# Patient Record
Sex: FEMALE | Race: WHITE | NOT HISPANIC OR LATINO | Employment: OTHER | URBAN - METROPOLITAN AREA
[De-identification: names, ages, dates, MRNs, and addresses within clinical notes are randomized per-mention and may not be internally consistent; named-entity substitution may affect disease eponyms.]

---

## 2017-01-01 ENCOUNTER — GENERIC CONVERSION - ENCOUNTER (OUTPATIENT)
Dept: OTHER | Facility: OTHER | Age: 82
End: 2017-01-01

## 2017-01-01 ENCOUNTER — HOSPITAL ENCOUNTER (INPATIENT)
Facility: HOSPITAL | Age: 82
LOS: 2 days | DRG: 083 | End: 2017-11-14
Attending: SURGERY | Admitting: SURGERY
Payer: MEDICARE

## 2017-01-01 ENCOUNTER — APPOINTMENT (EMERGENCY)
Dept: CT IMAGING | Facility: HOSPITAL | Age: 82
End: 2017-01-01
Payer: MEDICARE

## 2017-01-01 ENCOUNTER — APPOINTMENT (INPATIENT)
Dept: RADIOLOGY | Facility: HOSPITAL | Age: 82
DRG: 083 | End: 2017-01-01
Payer: MEDICARE

## 2017-01-01 ENCOUNTER — APPOINTMENT (EMERGENCY)
Dept: RADIOLOGY | Facility: HOSPITAL | Age: 82
DRG: 564 | End: 2017-01-01
Payer: MEDICARE

## 2017-01-01 ENCOUNTER — APPOINTMENT (INPATIENT)
Dept: NON INVASIVE DIAGNOSTICS | Facility: HOSPITAL | Age: 82
DRG: 564 | End: 2017-01-01
Payer: MEDICARE

## 2017-01-01 ENCOUNTER — APPOINTMENT (INPATIENT)
Dept: NEUROLOGY | Facility: AMBULATORY SURGERY CENTER | Age: 82
DRG: 100 | End: 2017-01-01
Payer: MEDICARE

## 2017-01-01 ENCOUNTER — HOSPITAL ENCOUNTER (INPATIENT)
Facility: HOSPITAL | Age: 82
LOS: 6 days | DRG: 100 | End: 2017-11-20
Attending: EMERGENCY MEDICINE | Admitting: SURGERY
Payer: MEDICARE

## 2017-01-01 ENCOUNTER — APPOINTMENT (INPATIENT)
Dept: RADIOLOGY | Facility: HOSPITAL | Age: 82
DRG: 564 | End: 2017-01-01
Payer: MEDICARE

## 2017-01-01 ENCOUNTER — APPOINTMENT (EMERGENCY)
Dept: RADIOLOGY | Facility: HOSPITAL | Age: 82
DRG: 291 | End: 2017-01-01
Payer: MEDICARE

## 2017-01-01 ENCOUNTER — HOSPITAL ENCOUNTER (EMERGENCY)
Facility: HOSPITAL | Age: 82
End: 2017-11-12
Attending: EMERGENCY MEDICINE | Admitting: EMERGENCY MEDICINE
Payer: MEDICARE

## 2017-01-01 ENCOUNTER — APPOINTMENT (EMERGENCY)
Dept: CT IMAGING | Facility: HOSPITAL | Age: 82
DRG: 564 | End: 2017-01-01
Payer: MEDICARE

## 2017-01-01 ENCOUNTER — HOSPITAL ENCOUNTER (INPATIENT)
Facility: HOSPITAL | Age: 82
LOS: 5 days | Discharge: NURSING, REHAB OR LONG-TERM CARE | DRG: 291 | End: 2017-05-12
Attending: EMERGENCY MEDICINE | Admitting: HOSPITALIST
Payer: MEDICARE

## 2017-01-01 ENCOUNTER — HOSPITAL ENCOUNTER (INPATIENT)
Facility: HOSPITAL | Age: 82
LOS: 4 days | Discharge: NURSING, REHAB OR LONG-TERM CARE | DRG: 564 | End: 2017-05-06
Attending: EMERGENCY MEDICINE | Admitting: INTERNAL MEDICINE
Payer: MEDICARE

## 2017-01-01 ENCOUNTER — APPOINTMENT (EMERGENCY)
Dept: RADIOLOGY | Facility: HOSPITAL | Age: 82
DRG: 083 | End: 2017-01-01
Payer: MEDICARE

## 2017-01-01 ENCOUNTER — APPOINTMENT (INPATIENT)
Dept: RADIOLOGY | Facility: HOSPITAL | Age: 82
DRG: 100 | End: 2017-01-01
Payer: MEDICARE

## 2017-01-01 ENCOUNTER — APPOINTMENT (EMERGENCY)
Dept: RADIOLOGY | Facility: HOSPITAL | Age: 82
DRG: 100 | End: 2017-01-01
Payer: MEDICARE

## 2017-01-01 VITALS
SYSTOLIC BLOOD PRESSURE: 152 MMHG | OXYGEN SATURATION: 100 % | TEMPERATURE: 97.9 F | HEIGHT: 62 IN | RESPIRATION RATE: 16 BRPM | DIASTOLIC BLOOD PRESSURE: 68 MMHG | BODY MASS INDEX: 28.64 KG/M2 | WEIGHT: 155.65 LBS | HEART RATE: 79 BPM

## 2017-01-01 VITALS
BODY MASS INDEX: 30.07 KG/M2 | SYSTOLIC BLOOD PRESSURE: 140 MMHG | HEART RATE: 58 BPM | DIASTOLIC BLOOD PRESSURE: 84 MMHG | WEIGHT: 176.15 LBS | TEMPERATURE: 98.5 F | HEIGHT: 64 IN | OXYGEN SATURATION: 99 % | RESPIRATION RATE: 20 BRPM

## 2017-01-01 VITALS
SYSTOLIC BLOOD PRESSURE: 116 MMHG | DIASTOLIC BLOOD PRESSURE: 81 MMHG | WEIGHT: 149.25 LBS | OXYGEN SATURATION: 89 % | HEIGHT: 62 IN | BODY MASS INDEX: 27.47 KG/M2 | HEART RATE: 90 BPM | RESPIRATION RATE: 16 BRPM | TEMPERATURE: 99.4 F

## 2017-01-01 VITALS
WEIGHT: 152.56 LBS | SYSTOLIC BLOOD PRESSURE: 142 MMHG | RESPIRATION RATE: 16 BRPM | DIASTOLIC BLOOD PRESSURE: 86 MMHG | OXYGEN SATURATION: 100 % | HEART RATE: 80 BPM | TEMPERATURE: 99.9 F | BODY MASS INDEX: 27.02 KG/M2

## 2017-01-01 VITALS
BODY MASS INDEX: 29.26 KG/M2 | HEIGHT: 63 IN | RESPIRATION RATE: 18 BRPM | WEIGHT: 165.12 LBS | HEART RATE: 78 BPM | OXYGEN SATURATION: 97 % | DIASTOLIC BLOOD PRESSURE: 65 MMHG | TEMPERATURE: 98 F | SYSTOLIC BLOOD PRESSURE: 146 MMHG

## 2017-01-01 DIAGNOSIS — J81.1 PULMONARY EDEMA: Primary | ICD-10-CM

## 2017-01-01 DIAGNOSIS — S00.83XA FACIAL CONTUSION: ICD-10-CM

## 2017-01-01 DIAGNOSIS — I83.019 VENOUS ULCERS OF BOTH LOWER EXTREMITIES (HCC): ICD-10-CM

## 2017-01-01 DIAGNOSIS — M62.82 RHABDOMYOLYSIS: ICD-10-CM

## 2017-01-01 DIAGNOSIS — D72.829 LEUKOCYTOSIS: ICD-10-CM

## 2017-01-01 DIAGNOSIS — S06.5X0A TRAUMATIC SUBDURAL HEMORRHAGE WITHOUT LOSS OF CONSCIOUSNESS, INITIAL ENCOUNTER (HCC): Primary | ICD-10-CM

## 2017-01-01 DIAGNOSIS — T14.8XXA MULTIPLE WOUNDS OF SKIN: ICD-10-CM

## 2017-01-01 DIAGNOSIS — L97.919 VENOUS ULCERS OF BOTH LOWER EXTREMITIES (HCC): ICD-10-CM

## 2017-01-01 DIAGNOSIS — L89.90 PRESSURE INJURY OF SKIN: ICD-10-CM

## 2017-01-01 DIAGNOSIS — S06.5X9A SUBDURAL HEMATOMA (HCC): Primary | ICD-10-CM

## 2017-01-01 DIAGNOSIS — R77.8 ELEVATED TROPONIN: ICD-10-CM

## 2017-01-01 DIAGNOSIS — R56.9 SEIZURE (HCC): Primary | ICD-10-CM

## 2017-01-01 DIAGNOSIS — I50.9 ACUTE CONGESTIVE HEART FAILURE (HCC): ICD-10-CM

## 2017-01-01 DIAGNOSIS — R41.82 ALTERED MENTAL STATUS: ICD-10-CM

## 2017-01-01 DIAGNOSIS — T79.6XXA TRAUMATIC RHABDOMYOLYSIS, INITIAL ENCOUNTER (HCC): ICD-10-CM

## 2017-01-01 DIAGNOSIS — I83.029 VENOUS ULCERS OF BOTH LOWER EXTREMITIES (HCC): ICD-10-CM

## 2017-01-01 DIAGNOSIS — N39.0 UTI (URINARY TRACT INFECTION): ICD-10-CM

## 2017-01-01 DIAGNOSIS — R29.6 MULTIPLE FALLS: ICD-10-CM

## 2017-01-01 DIAGNOSIS — R41.82 ALTERED MENTAL STATUS, UNSPECIFIED ALTERED MENTAL STATUS TYPE: ICD-10-CM

## 2017-01-01 DIAGNOSIS — L97.929 VENOUS ULCERS OF BOTH LOWER EXTREMITIES (HCC): ICD-10-CM

## 2017-01-01 DIAGNOSIS — N17.9 ACUTE KIDNEY INJURY (HCC): ICD-10-CM

## 2017-01-01 DIAGNOSIS — N39.0 URINARY TRACT INFECTION: Primary | ICD-10-CM

## 2017-01-01 LAB
ALBUMIN SERPL BCP-MCNC: 2.6 G/DL (ref 3.5–5)
ALBUMIN SERPL BCP-MCNC: 2.9 G/DL (ref 3.5–5)
ALBUMIN SERPL BCP-MCNC: 3.2 G/DL (ref 3.5–5)
ALBUMIN SERPL BCP-MCNC: 3.7 G/DL (ref 3.5–5)
ALP SERPL-CCNC: 50 U/L (ref 46–116)
ALP SERPL-CCNC: 69 U/L (ref 46–116)
ALP SERPL-CCNC: 72 U/L (ref 46–116)
ALP SERPL-CCNC: 95 U/L (ref 46–116)
ALT SERPL W P-5'-P-CCNC: 11 U/L (ref 12–78)
ALT SERPL W P-5'-P-CCNC: 37 U/L (ref 12–78)
ALT SERPL W P-5'-P-CCNC: 38 U/L (ref 12–78)
ALT SERPL W P-5'-P-CCNC: 49 U/L (ref 12–78)
ANION GAP BLD CALC-SCNC: 14 MMOL/L (ref 4–13)
ANION GAP SERPL CALCULATED.3IONS-SCNC: 10 MMOL/L (ref 4–13)
ANION GAP SERPL CALCULATED.3IONS-SCNC: 12 MMOL/L (ref 4–13)
ANION GAP SERPL CALCULATED.3IONS-SCNC: 13 MMOL/L (ref 4–13)
ANION GAP SERPL CALCULATED.3IONS-SCNC: 13 MMOL/L (ref 4–13)
ANION GAP SERPL CALCULATED.3IONS-SCNC: 14 MMOL/L (ref 4–13)
ANION GAP SERPL CALCULATED.3IONS-SCNC: 16 MMOL/L (ref 4–13)
ANION GAP SERPL CALCULATED.3IONS-SCNC: 5 MMOL/L (ref 4–13)
ANION GAP SERPL CALCULATED.3IONS-SCNC: 6 MMOL/L (ref 4–13)
ANION GAP SERPL CALCULATED.3IONS-SCNC: 6 MMOL/L (ref 4–13)
ANION GAP SERPL CALCULATED.3IONS-SCNC: 7 MMOL/L (ref 4–13)
ANION GAP SERPL CALCULATED.3IONS-SCNC: 7 MMOL/L (ref 4–13)
ANION GAP SERPL CALCULATED.3IONS-SCNC: 8 MMOL/L (ref 4–13)
ANION GAP SERPL CALCULATED.3IONS-SCNC: 9 MMOL/L (ref 4–13)
ANISOCYTOSIS BLD QL SMEAR: PRESENT
ANISOCYTOSIS BLD QL SMEAR: PRESENT
APTT PPP: 108 SECONDS (ref 23–35)
APTT PPP: 22 SECONDS (ref 23–35)
APTT PPP: 26 SECONDS (ref 23–35)
APTT PPP: 47 SECONDS (ref 23–35)
AST SERPL W P-5'-P-CCNC: 104 U/L (ref 5–45)
AST SERPL W P-5'-P-CCNC: 16 U/L (ref 5–45)
AST SERPL W P-5'-P-CCNC: 40 U/L (ref 5–45)
AST SERPL W P-5'-P-CCNC: 96 U/L (ref 5–45)
ATRIAL RATE: 120 BPM
ATRIAL RATE: 137 BPM
ATRIAL RATE: 158 BPM
ATRIAL RATE: 90 BPM
BACTERIA BLD CULT: NORMAL
BACTERIA UR CULT: NORMAL
BACTERIA UR QL AUTO: ABNORMAL /HPF
BACTERIA UR QL AUTO: ABNORMAL /HPF
BACTERIA UR QL AUTO: NORMAL /HPF
BASE EX.OXY STD BLDV CALC-SCNC: 66.5 % (ref 60–80)
BASE EXCESS BLDA CALC-SCNC: -8 MMOL/L (ref -2–3)
BASE EXCESS BLDA CALC-SCNC: 3 MMOL/L (ref -2–3)
BASE EXCESS BLDV CALC-SCNC: 2.6 MMOL/L
BASOPHILS # BLD AUTO: 0.02 THOUSANDS/ΜL (ref 0–0.1)
BASOPHILS # BLD AUTO: 0.03 THOUSANDS/ΜL (ref 0–0.1)
BASOPHILS # BLD AUTO: 0.04 THOUSANDS/ΜL (ref 0–0.1)
BASOPHILS # BLD AUTO: 0.05 THOUSANDS/ΜL (ref 0–0.1)
BASOPHILS # BLD AUTO: 0.06 THOUSANDS/ΜL (ref 0–0.1)
BASOPHILS # BLD AUTO: 0.06 THOUSANDS/ΜL (ref 0–0.1)
BASOPHILS # BLD MANUAL: 0 THOUSAND/UL (ref 0–0.1)
BASOPHILS # BLD MANUAL: 0 THOUSAND/UL (ref 0–0.1)
BASOPHILS # BLD MANUAL: 0.69 THOUSAND/UL (ref 0–0.1)
BASOPHILS NFR BLD AUTO: 0 % (ref 0–1)
BASOPHILS NFR MAR MANUAL: 0 % (ref 0–1)
BASOPHILS NFR MAR MANUAL: 0 % (ref 0–1)
BASOPHILS NFR MAR MANUAL: 2 % (ref 0–1)
BILIRUB SERPL-MCNC: 0.22 MG/DL (ref 0.2–1)
BILIRUB SERPL-MCNC: 0.26 MG/DL (ref 0.2–1)
BILIRUB SERPL-MCNC: 0.6 MG/DL (ref 0.2–1)
BILIRUB SERPL-MCNC: 1.1 MG/DL (ref 0.2–1)
BILIRUB UR QL STRIP: NEGATIVE
BUN BLD-MCNC: 32 MG/DL (ref 5–25)
BUN SERPL-MCNC: 24 MG/DL (ref 5–25)
BUN SERPL-MCNC: 26 MG/DL (ref 5–25)
BUN SERPL-MCNC: 28 MG/DL (ref 5–25)
BUN SERPL-MCNC: 34 MG/DL (ref 5–25)
BUN SERPL-MCNC: 34 MG/DL (ref 5–25)
BUN SERPL-MCNC: 35 MG/DL (ref 5–25)
BUN SERPL-MCNC: 36 MG/DL (ref 5–25)
BUN SERPL-MCNC: 37 MG/DL (ref 5–25)
BUN SERPL-MCNC: 38 MG/DL (ref 5–25)
BUN SERPL-MCNC: 38 MG/DL (ref 5–25)
BUN SERPL-MCNC: 40 MG/DL (ref 5–25)
BUN SERPL-MCNC: 43 MG/DL (ref 5–25)
BUN SERPL-MCNC: 44 MG/DL (ref 5–25)
BUN SERPL-MCNC: 44 MG/DL (ref 5–25)
BUN SERPL-MCNC: 45 MG/DL (ref 5–25)
BUN SERPL-MCNC: 51 MG/DL (ref 5–25)
BURR CELLS BLD QL SMEAR: PRESENT
BURR CELLS BLD QL SMEAR: PRESENT
CA-I BLD-SCNC: 1.18 MMOL/L (ref 1.12–1.32)
CA-I BLD-SCNC: 1.19 MMOL/L (ref 1.12–1.32)
CA-I BLD-SCNC: 1.24 MMOL/L (ref 1.12–1.32)
CALCIUM SERPL-MCNC: 7.6 MG/DL (ref 8.3–10.1)
CALCIUM SERPL-MCNC: 7.6 MG/DL (ref 8.3–10.1)
CALCIUM SERPL-MCNC: 7.8 MG/DL (ref 8.3–10.1)
CALCIUM SERPL-MCNC: 8 MG/DL (ref 8.3–10.1)
CALCIUM SERPL-MCNC: 8.1 MG/DL (ref 8.3–10.1)
CALCIUM SERPL-MCNC: 8.3 MG/DL (ref 8.3–10.1)
CALCIUM SERPL-MCNC: 8.3 MG/DL (ref 8.3–10.1)
CALCIUM SERPL-MCNC: 8.4 MG/DL (ref 8.3–10.1)
CALCIUM SERPL-MCNC: 8.4 MG/DL (ref 8.3–10.1)
CALCIUM SERPL-MCNC: 8.5 MG/DL (ref 8.3–10.1)
CALCIUM SERPL-MCNC: 8.6 MG/DL (ref 8.3–10.1)
CALCIUM SERPL-MCNC: 8.6 MG/DL (ref 8.3–10.1)
CALCIUM SERPL-MCNC: 8.7 MG/DL (ref 8.3–10.1)
CALCIUM SERPL-MCNC: 8.8 MG/DL (ref 8.3–10.1)
CALCIUM SERPL-MCNC: 8.8 MG/DL (ref 8.3–10.1)
CALCIUM SERPL-MCNC: 9.4 MG/DL (ref 8.3–10.1)
CHLORIDE BLD-SCNC: 95 MMOL/L (ref 100–108)
CHLORIDE SERPL-SCNC: 100 MMOL/L (ref 100–108)
CHLORIDE SERPL-SCNC: 102 MMOL/L (ref 100–108)
CHLORIDE SERPL-SCNC: 104 MMOL/L (ref 100–108)
CHLORIDE SERPL-SCNC: 106 MMOL/L (ref 100–108)
CHLORIDE SERPL-SCNC: 107 MMOL/L (ref 100–108)
CHLORIDE SERPL-SCNC: 107 MMOL/L (ref 100–108)
CHLORIDE SERPL-SCNC: 108 MMOL/L (ref 100–108)
CHLORIDE SERPL-SCNC: 109 MMOL/L (ref 100–108)
CHLORIDE SERPL-SCNC: 110 MMOL/L (ref 100–108)
CHLORIDE SERPL-SCNC: 94 MMOL/L (ref 100–108)
CHLORIDE SERPL-SCNC: 97 MMOL/L (ref 100–108)
CHLORIDE SERPL-SCNC: 98 MMOL/L (ref 100–108)
CHLORIDE SERPL-SCNC: 99 MMOL/L (ref 100–108)
CHOLEST SERPL-MCNC: 158 MG/DL (ref 50–200)
CK MB SERPL-MCNC: 1.1 % (ref 0–2.5)
CK MB SERPL-MCNC: 1.8 % (ref 0–2.5)
CK MB SERPL-MCNC: 1.9 % (ref 0–2.5)
CK MB SERPL-MCNC: 11.5 NG/ML (ref 0–5)
CK MB SERPL-MCNC: 2.1 % (ref 0–2.5)
CK MB SERPL-MCNC: 4.5 NG/ML (ref 0–5)
CK MB SERPL-MCNC: 43.6 NG/ML (ref 0–5)
CK MB SERPL-MCNC: 5.9 NG/ML (ref 0–5)
CK MB SERPL-MCNC: 58.9 NG/ML (ref 0–5)
CK MB SERPL-MCNC: 61.2 NG/ML (ref 0–5)
CK MB SERPL-MCNC: <1 % (ref 0–2.5)
CK MB SERPL-MCNC: <1 % (ref 0–2.5)
CK SERPL-CCNC: 1469 U/L (ref 26–192)
CK SERPL-CCNC: 2253 U/L (ref 26–192)
CK SERPL-CCNC: 2841 U/L (ref 26–192)
CK SERPL-CCNC: 3446 U/L (ref 26–192)
CK SERPL-CCNC: 408 U/L (ref 26–192)
CK SERPL-CCNC: 746 U/L (ref 26–192)
CLARITY UR: ABNORMAL
CLARITY UR: ABNORMAL
CLARITY UR: CLEAR
CLARITY UR: CLEAR
CLARITY, POC: CLEAR
CO2 SERPL-SCNC: 18 MMOL/L (ref 21–32)
CO2 SERPL-SCNC: 18 MMOL/L (ref 21–32)
CO2 SERPL-SCNC: 19 MMOL/L (ref 21–32)
CO2 SERPL-SCNC: 20 MMOL/L (ref 21–32)
CO2 SERPL-SCNC: 20 MMOL/L (ref 21–32)
CO2 SERPL-SCNC: 21 MMOL/L (ref 21–32)
CO2 SERPL-SCNC: 22 MMOL/L (ref 21–32)
CO2 SERPL-SCNC: 23 MMOL/L (ref 21–32)
CO2 SERPL-SCNC: 25 MMOL/L (ref 21–32)
CO2 SERPL-SCNC: 28 MMOL/L (ref 21–32)
CO2 SERPL-SCNC: 30 MMOL/L (ref 21–32)
COLOR UR: YELLOW
COLOR, POC: NORMAL
COLOR, POC: YELLOW
CREAT BLD-MCNC: 1.3 MG/DL (ref 0.6–1.3)
CREAT SERPL-MCNC: 1.25 MG/DL (ref 0.6–1.3)
CREAT SERPL-MCNC: 1.25 MG/DL (ref 0.6–1.3)
CREAT SERPL-MCNC: 1.28 MG/DL (ref 0.6–1.3)
CREAT SERPL-MCNC: 1.33 MG/DL (ref 0.6–1.3)
CREAT SERPL-MCNC: 1.34 MG/DL (ref 0.6–1.3)
CREAT SERPL-MCNC: 1.38 MG/DL (ref 0.6–1.3)
CREAT SERPL-MCNC: 1.38 MG/DL (ref 0.6–1.3)
CREAT SERPL-MCNC: 1.4 MG/DL (ref 0.6–1.3)
CREAT SERPL-MCNC: 1.4 MG/DL (ref 0.6–1.3)
CREAT SERPL-MCNC: 1.41 MG/DL (ref 0.6–1.3)
CREAT SERPL-MCNC: 1.45 MG/DL (ref 0.6–1.3)
CREAT SERPL-MCNC: 1.47 MG/DL (ref 0.6–1.3)
CREAT SERPL-MCNC: 1.51 MG/DL (ref 0.6–1.3)
CREAT SERPL-MCNC: 1.55 MG/DL (ref 0.6–1.3)
CREAT SERPL-MCNC: 1.56 MG/DL (ref 0.6–1.3)
CREAT SERPL-MCNC: 1.73 MG/DL (ref 0.6–1.3)
CREAT SERPL-MCNC: 1.75 MG/DL (ref 0.6–1.3)
CREAT SERPL-MCNC: 1.76 MG/DL (ref 0.6–1.3)
EOSINOPHIL # BLD AUTO: 0 THOUSAND/ΜL (ref 0–0.61)
EOSINOPHIL # BLD AUTO: 0.17 THOUSAND/ΜL (ref 0–0.61)
EOSINOPHIL # BLD AUTO: 0.18 THOUSAND/ΜL (ref 0–0.61)
EOSINOPHIL # BLD AUTO: 0.25 THOUSAND/ΜL (ref 0–0.61)
EOSINOPHIL # BLD AUTO: 0.26 THOUSAND/ΜL (ref 0–0.61)
EOSINOPHIL # BLD AUTO: 0.26 THOUSAND/ΜL (ref 0–0.61)
EOSINOPHIL # BLD AUTO: 0.29 THOUSAND/ΜL (ref 0–0.61)
EOSINOPHIL # BLD AUTO: 0.31 THOUSAND/ΜL (ref 0–0.61)
EOSINOPHIL # BLD AUTO: 0.37 THOUSAND/ΜL (ref 0–0.61)
EOSINOPHIL # BLD AUTO: 0.43 THOUSAND/ΜL (ref 0–0.61)
EOSINOPHIL # BLD AUTO: 0.58 THOUSAND/ΜL (ref 0–0.61)
EOSINOPHIL # BLD MANUAL: 0 THOUSAND/UL (ref 0–0.4)
EOSINOPHIL NFR BLD AUTO: 0 % (ref 0–6)
EOSINOPHIL NFR BLD AUTO: 1 % (ref 0–6)
EOSINOPHIL NFR BLD AUTO: 1 % (ref 0–6)
EOSINOPHIL NFR BLD AUTO: 2 % (ref 0–6)
EOSINOPHIL NFR BLD AUTO: 2 % (ref 0–6)
EOSINOPHIL NFR BLD AUTO: 3 % (ref 0–6)
EOSINOPHIL NFR BLD AUTO: 4 % (ref 0–6)
EOSINOPHIL NFR BLD AUTO: 4 % (ref 0–6)
EOSINOPHIL NFR BLD MANUAL: 0 % (ref 0–6)
ERYTHROCYTE [DISTWIDTH] IN BLOOD BY AUTOMATED COUNT: 13.5 % (ref 11.6–15.1)
ERYTHROCYTE [DISTWIDTH] IN BLOOD BY AUTOMATED COUNT: 13.8 % (ref 11.6–15.1)
ERYTHROCYTE [DISTWIDTH] IN BLOOD BY AUTOMATED COUNT: 14.2 % (ref 11.6–15.1)
ERYTHROCYTE [DISTWIDTH] IN BLOOD BY AUTOMATED COUNT: 14.3 % (ref 11.6–15.1)
ERYTHROCYTE [DISTWIDTH] IN BLOOD BY AUTOMATED COUNT: 14.4 % (ref 11.6–15.1)
ERYTHROCYTE [DISTWIDTH] IN BLOOD BY AUTOMATED COUNT: 14.9 % (ref 11.6–15.1)
ERYTHROCYTE [DISTWIDTH] IN BLOOD BY AUTOMATED COUNT: 14.9 % (ref 11.6–15.1)
ERYTHROCYTE [DISTWIDTH] IN BLOOD BY AUTOMATED COUNT: 15 % (ref 11.6–15.1)
ERYTHROCYTE [DISTWIDTH] IN BLOOD BY AUTOMATED COUNT: 15.1 % (ref 11.6–15.1)
ERYTHROCYTE [DISTWIDTH] IN BLOOD BY AUTOMATED COUNT: 15.2 % (ref 11.6–15.1)
ERYTHROCYTE [DISTWIDTH] IN BLOOD BY AUTOMATED COUNT: 15.3 % (ref 11.6–15.1)
ERYTHROCYTE [DISTWIDTH] IN BLOOD BY AUTOMATED COUNT: 16.1 % (ref 11.6–15.1)
ERYTHROCYTE [DISTWIDTH] IN BLOOD BY AUTOMATED COUNT: 16.1 % (ref 11.6–15.1)
ERYTHROCYTE [DISTWIDTH] IN BLOOD BY AUTOMATED COUNT: 16.2 % (ref 11.6–15.1)
ERYTHROCYTE [DISTWIDTH] IN BLOOD BY AUTOMATED COUNT: 16.4 % (ref 11.6–15.1)
ERYTHROCYTE [DISTWIDTH] IN BLOOD BY AUTOMATED COUNT: 16.6 % (ref 11.6–15.1)
ERYTHROCYTE [DISTWIDTH] IN BLOOD BY AUTOMATED COUNT: 16.8 % (ref 11.6–15.1)
EST. AVERAGE GLUCOSE BLD GHB EST-MCNC: 180 MG/DL
EXT BILIRUBIN, UA: NEGATIVE
EXT BLOOD URINE: NORMAL
EXT GLUCOSE, UA: NEGATIVE
EXT KETONES: NEGATIVE
EXT NITRITE, UA: NEGATIVE
EXT PH, UA: 5
EXT PROTEIN, UA: NEGATIVE
EXT SPECIFIC GRAVITY, UA: 1.01
EXT UROBILINOGEN: 0.2
FERRITIN SERPL-MCNC: 100 NG/ML (ref 8–388)
FOLATE SERPL-MCNC: 19.4 NG/ML (ref 3.1–17.5)
GFR SERPL CREATININE-BSD FRML MDRD: 24 ML/MIN/1.73SQ M
GFR SERPL CREATININE-BSD FRML MDRD: 26.8 ML/MIN/1.73SQ M
GFR SERPL CREATININE-BSD FRML MDRD: 27.3 ML/MIN/1.73SQ M
GFR SERPL CREATININE-BSD FRML MDRD: 29 ML/MIN/1.73SQ M
GFR SERPL CREATININE-BSD FRML MDRD: 30 ML/MIN/1.73SQ M
GFR SERPL CREATININE-BSD FRML MDRD: 30 ML/MIN/1.73SQ M
GFR SERPL CREATININE-BSD FRML MDRD: 30.8 ML/MIN/1.73SQ M
GFR SERPL CREATININE-BSD FRML MDRD: 31 ML/MIN/1.73SQ M
GFR SERPL CREATININE-BSD FRML MDRD: 31 ML/MIN/1.73SQ M
GFR SERPL CREATININE-BSD FRML MDRD: 32 ML/MIN/1.73SQ M
GFR SERPL CREATININE-BSD FRML MDRD: 34.9 ML/MIN/1.73SQ M
GFR SERPL CREATININE-BSD FRML MDRD: 35 ML/MIN/1.73SQ M
GFR SERPL CREATININE-BSD FRML MDRD: 35.5 ML/MIN/1.73SQ M
GFR SERPL CREATININE-BSD FRML MDRD: 35.5 ML/MIN/1.73SQ M
GFR SERPL CREATININE-BSD FRML MDRD: 36 ML/MIN/1.73SQ M
GFR SERPL CREATININE-BSD FRML MDRD: 36.7 ML/MIN/1.73SQ M
GFR SERPL CREATININE-BSD FRML MDRD: 37 ML/MIN/1.73SQ M
GFR SERPL CREATININE-BSD FRML MDRD: 38.7 ML/MIN/1.73SQ M
GFR SERPL CREATININE-BSD FRML MDRD: 39.7 ML/MIN/1.73SQ M
GLUCOSE SERPL-MCNC: 106 MG/DL (ref 65–140)
GLUCOSE SERPL-MCNC: 108 MG/DL (ref 65–140)
GLUCOSE SERPL-MCNC: 112 MG/DL (ref 65–140)
GLUCOSE SERPL-MCNC: 132 MG/DL (ref 65–140)
GLUCOSE SERPL-MCNC: 132 MG/DL (ref 65–140)
GLUCOSE SERPL-MCNC: 137 MG/DL (ref 65–140)
GLUCOSE SERPL-MCNC: 138 MG/DL (ref 65–140)
GLUCOSE SERPL-MCNC: 145 MG/DL (ref 65–140)
GLUCOSE SERPL-MCNC: 147 MG/DL (ref 65–140)
GLUCOSE SERPL-MCNC: 155 MG/DL (ref 65–140)
GLUCOSE SERPL-MCNC: 158 MG/DL (ref 65–140)
GLUCOSE SERPL-MCNC: 161 MG/DL (ref 65–140)
GLUCOSE SERPL-MCNC: 163 MG/DL (ref 65–140)
GLUCOSE SERPL-MCNC: 169 MG/DL (ref 65–140)
GLUCOSE SERPL-MCNC: 169 MG/DL (ref 65–140)
GLUCOSE SERPL-MCNC: 174 MG/DL (ref 65–140)
GLUCOSE SERPL-MCNC: 179 MG/DL (ref 65–140)
GLUCOSE SERPL-MCNC: 180 MG/DL (ref 65–140)
GLUCOSE SERPL-MCNC: 183 MG/DL (ref 65–140)
GLUCOSE SERPL-MCNC: 187 MG/DL (ref 65–140)
GLUCOSE SERPL-MCNC: 189 MG/DL (ref 65–140)
GLUCOSE SERPL-MCNC: 194 MG/DL (ref 65–140)
GLUCOSE SERPL-MCNC: 194 MG/DL (ref 65–140)
GLUCOSE SERPL-MCNC: 196 MG/DL (ref 65–140)
GLUCOSE SERPL-MCNC: 197 MG/DL (ref 65–140)
GLUCOSE SERPL-MCNC: 199 MG/DL (ref 65–140)
GLUCOSE SERPL-MCNC: 201 MG/DL (ref 65–140)
GLUCOSE SERPL-MCNC: 204 MG/DL (ref 65–140)
GLUCOSE SERPL-MCNC: 204 MG/DL (ref 65–140)
GLUCOSE SERPL-MCNC: 205 MG/DL (ref 65–140)
GLUCOSE SERPL-MCNC: 207 MG/DL (ref 65–140)
GLUCOSE SERPL-MCNC: 209 MG/DL (ref 65–140)
GLUCOSE SERPL-MCNC: 213 MG/DL (ref 65–140)
GLUCOSE SERPL-MCNC: 215 MG/DL (ref 65–140)
GLUCOSE SERPL-MCNC: 216 MG/DL (ref 65–140)
GLUCOSE SERPL-MCNC: 219 MG/DL (ref 65–140)
GLUCOSE SERPL-MCNC: 230 MG/DL (ref 65–140)
GLUCOSE SERPL-MCNC: 231 MG/DL (ref 65–140)
GLUCOSE SERPL-MCNC: 231 MG/DL (ref 65–140)
GLUCOSE SERPL-MCNC: 233 MG/DL (ref 65–140)
GLUCOSE SERPL-MCNC: 238 MG/DL (ref 65–140)
GLUCOSE SERPL-MCNC: 245 MG/DL (ref 65–140)
GLUCOSE SERPL-MCNC: 249 MG/DL (ref 65–140)
GLUCOSE SERPL-MCNC: 251 MG/DL (ref 65–140)
GLUCOSE SERPL-MCNC: 251 MG/DL (ref 65–140)
GLUCOSE SERPL-MCNC: 262 MG/DL (ref 65–140)
GLUCOSE SERPL-MCNC: 264 MG/DL (ref 65–140)
GLUCOSE SERPL-MCNC: 271 MG/DL (ref 65–140)
GLUCOSE SERPL-MCNC: 272 MG/DL (ref 65–140)
GLUCOSE SERPL-MCNC: 276 MG/DL (ref 65–140)
GLUCOSE SERPL-MCNC: 277 MG/DL (ref 65–140)
GLUCOSE SERPL-MCNC: 279 MG/DL (ref 65–140)
GLUCOSE SERPL-MCNC: 280 MG/DL (ref 65–140)
GLUCOSE SERPL-MCNC: 288 MG/DL (ref 65–140)
GLUCOSE SERPL-MCNC: 295 MG/DL (ref 65–140)
GLUCOSE SERPL-MCNC: 298 MG/DL (ref 65–140)
GLUCOSE SERPL-MCNC: 306 MG/DL (ref 65–140)
GLUCOSE SERPL-MCNC: 307 MG/DL (ref 65–140)
GLUCOSE SERPL-MCNC: 312 MG/DL (ref 65–140)
GLUCOSE SERPL-MCNC: 320 MG/DL (ref 65–140)
GLUCOSE SERPL-MCNC: 333 MG/DL (ref 65–140)
GLUCOSE SERPL-MCNC: 337 MG/DL (ref 65–140)
GLUCOSE UR STRIP-MCNC: ABNORMAL MG/DL
GLUCOSE UR STRIP-MCNC: NEGATIVE MG/DL
HBA1C MFR BLD: 7.9 % (ref 4.2–6.3)
HCO3 BLDA-SCNC: 19.5 MMOL/L (ref 24–30)
HCO3 BLDA-SCNC: 32.7 MMOL/L (ref 24–30)
HCO3 BLDV-SCNC: 30 MMOL/L (ref 24–30)
HCT VFR BLD AUTO: 25.5 % (ref 34.8–46.1)
HCT VFR BLD AUTO: 25.8 % (ref 34.8–46.1)
HCT VFR BLD AUTO: 26.8 % (ref 34.8–46.1)
HCT VFR BLD AUTO: 27.1 % (ref 34.8–46.1)
HCT VFR BLD AUTO: 27.8 % (ref 34.8–46.1)
HCT VFR BLD AUTO: 27.9 % (ref 34.8–46.1)
HCT VFR BLD AUTO: 28.9 % (ref 34.8–46.1)
HCT VFR BLD AUTO: 29 % (ref 34.8–46.1)
HCT VFR BLD AUTO: 29.2 % (ref 34.8–46.1)
HCT VFR BLD AUTO: 29.3 % (ref 34.8–46.1)
HCT VFR BLD AUTO: 29.7 % (ref 34.8–46.1)
HCT VFR BLD AUTO: 29.8 % (ref 34.8–46.1)
HCT VFR BLD AUTO: 30.2 % (ref 34.8–46.1)
HCT VFR BLD AUTO: 30.3 % (ref 34.8–46.1)
HCT VFR BLD AUTO: 30.7 % (ref 34.8–46.1)
HCT VFR BLD AUTO: 32 % (ref 34.8–46.1)
HCT VFR BLD AUTO: 38.1 % (ref 34.8–46.1)
HCT VFR BLD CALC: 29 % (ref 34.8–46.1)
HCT VFR BLD CALC: 33 % (ref 34.8–46.1)
HCT VFR BLD CALC: 34 % (ref 34.8–46.1)
HDLC SERPL-MCNC: 38 MG/DL (ref 40–60)
HGB BLD-MCNC: 10 G/DL (ref 11.5–15.4)
HGB BLD-MCNC: 10.5 G/DL (ref 11.5–15.4)
HGB BLD-MCNC: 12.8 G/DL (ref 11.5–15.4)
HGB BLD-MCNC: 8.2 G/DL (ref 11.5–15.4)
HGB BLD-MCNC: 8.6 G/DL (ref 11.5–15.4)
HGB BLD-MCNC: 8.7 G/DL (ref 11.5–15.4)
HGB BLD-MCNC: 8.8 G/DL (ref 11.5–15.4)
HGB BLD-MCNC: 8.8 G/DL (ref 11.5–15.4)
HGB BLD-MCNC: 8.9 G/DL (ref 11.5–15.4)
HGB BLD-MCNC: 9.1 G/DL (ref 11.5–15.4)
HGB BLD-MCNC: 9.3 G/DL (ref 11.5–15.4)
HGB BLD-MCNC: 9.4 G/DL (ref 11.5–15.4)
HGB BLD-MCNC: 9.4 G/DL (ref 11.5–15.4)
HGB BLD-MCNC: 9.6 G/DL (ref 11.5–15.4)
HGB BLD-MCNC: 9.6 G/DL (ref 11.5–15.4)
HGB BLD-MCNC: 9.8 G/DL (ref 11.5–15.4)
HGB BLD-MCNC: 9.9 G/DL (ref 11.5–15.4)
HGB BLDA-MCNC: 11.2 G/DL (ref 11.5–15.4)
HGB BLDA-MCNC: 11.6 G/DL (ref 11.5–15.4)
HGB BLDA-MCNC: 9.9 G/DL (ref 11.5–15.4)
HGB UR QL STRIP.AUTO: ABNORMAL
HGB UR QL STRIP.AUTO: NEGATIVE
HYALINE CASTS #/AREA URNS LPF: ABNORMAL /LPF
HYALINE CASTS #/AREA URNS LPF: NORMAL /LPF
INR PPP: 1 (ref 0.86–1.16)
INR PPP: 1.11 (ref 0.86–1.16)
INR PPP: 1.25 (ref 0.86–1.16)
IRON SATN MFR SERPL: 9 %
IRON SERPL-MCNC: 30 UG/DL (ref 50–170)
KETONES UR STRIP-MCNC: ABNORMAL MG/DL
KETONES UR STRIP-MCNC: NEGATIVE MG/DL
LACTATE SERPL-SCNC: 1.3 MMOL/L (ref 0.5–2)
LACTATE SERPL-SCNC: 1.6 MMOL/L (ref 0.5–2)
LACTATE SERPL-SCNC: 1.7 MMOL/L (ref 0.5–2)
LACTATE SERPL-SCNC: 1.7 MMOL/L (ref 0.5–2)
LACTATE SERPL-SCNC: 2.1 MMOL/L (ref 0.5–2)
LACTATE SERPL-SCNC: 2.3 MMOL/L (ref 0.5–2)
LDLC SERPL CALC-MCNC: 100 MG/DL (ref 0–100)
LEUKOCYTE ESTERASE UR QL STRIP: ABNORMAL
LEUKOCYTE ESTERASE UR QL STRIP: ABNORMAL
LEUKOCYTE ESTERASE UR QL STRIP: NEGATIVE
LEUKOCYTE ESTERASE UR QL STRIP: NEGATIVE
LYMPHOCYTES # BLD AUTO: 0.35 THOUSAND/UL (ref 0.6–4.47)
LYMPHOCYTES # BLD AUTO: 0.9 THOUSAND/UL (ref 0.6–4.47)
LYMPHOCYTES # BLD AUTO: 1 % (ref 14–44)
LYMPHOCYTES # BLD AUTO: 1.12 THOUSAND/UL (ref 0.6–4.47)
LYMPHOCYTES # BLD AUTO: 1.22 THOUSANDS/ΜL (ref 0.6–4.47)
LYMPHOCYTES # BLD AUTO: 1.29 THOUSANDS/ΜL (ref 0.6–4.47)
LYMPHOCYTES # BLD AUTO: 1.39 THOUSANDS/ΜL (ref 0.6–4.47)
LYMPHOCYTES # BLD AUTO: 1.47 THOUSANDS/ΜL (ref 0.6–4.47)
LYMPHOCYTES # BLD AUTO: 1.51 THOUSANDS/ΜL (ref 0.6–4.47)
LYMPHOCYTES # BLD AUTO: 1.51 THOUSANDS/ΜL (ref 0.6–4.47)
LYMPHOCYTES # BLD AUTO: 1.56 THOUSANDS/ΜL (ref 0.6–4.47)
LYMPHOCYTES # BLD AUTO: 1.58 THOUSANDS/ΜL (ref 0.6–4.47)
LYMPHOCYTES # BLD AUTO: 2.54 THOUSANDS/ΜL (ref 0.6–4.47)
LYMPHOCYTES # BLD AUTO: 3 % (ref 14–44)
LYMPHOCYTES # BLD AUTO: 3.97 THOUSANDS/ΜL (ref 0.6–4.47)
LYMPHOCYTES # BLD AUTO: 4 % (ref 14–44)
LYMPHOCYTES # BLD AUTO: 6.16 THOUSANDS/ΜL (ref 0.6–4.47)
LYMPHOCYTES NFR BLD AUTO: 10 % (ref 14–44)
LYMPHOCYTES NFR BLD AUTO: 12 % (ref 14–44)
LYMPHOCYTES NFR BLD AUTO: 16 % (ref 14–44)
LYMPHOCYTES NFR BLD AUTO: 17 % (ref 14–44)
LYMPHOCYTES NFR BLD AUTO: 18 % (ref 14–44)
LYMPHOCYTES NFR BLD AUTO: 19 % (ref 14–44)
LYMPHOCYTES NFR BLD AUTO: 19 % (ref 14–44)
LYMPHOCYTES NFR BLD AUTO: 28 % (ref 14–44)
LYMPHOCYTES NFR BLD AUTO: 5 % (ref 14–44)
MAGNESIUM SERPL-MCNC: 1.9 MG/DL (ref 1.6–2.6)
MAGNESIUM SERPL-MCNC: 1.9 MG/DL (ref 1.6–2.6)
MAGNESIUM SERPL-MCNC: 2 MG/DL (ref 1.6–2.6)
MAGNESIUM SERPL-MCNC: 2.3 MG/DL (ref 1.6–2.6)
MAGNESIUM SERPL-MCNC: 2.4 MG/DL (ref 1.6–2.6)
MCH RBC QN AUTO: 25.6 PG (ref 26.8–34.3)
MCH RBC QN AUTO: 25.8 PG (ref 26.8–34.3)
MCH RBC QN AUTO: 25.9 PG (ref 26.8–34.3)
MCH RBC QN AUTO: 26.4 PG (ref 26.8–34.3)
MCH RBC QN AUTO: 26.4 PG (ref 26.8–34.3)
MCH RBC QN AUTO: 27.1 PG (ref 26.8–34.3)
MCH RBC QN AUTO: 28.4 PG (ref 26.8–34.3)
MCH RBC QN AUTO: 28.5 PG (ref 26.8–34.3)
MCH RBC QN AUTO: 28.5 PG (ref 26.8–34.3)
MCH RBC QN AUTO: 28.6 PG (ref 26.8–34.3)
MCH RBC QN AUTO: 28.7 PG (ref 26.8–34.3)
MCH RBC QN AUTO: 28.7 PG (ref 26.8–34.3)
MCH RBC QN AUTO: 29 PG (ref 26.8–34.3)
MCH RBC QN AUTO: 29.1 PG (ref 26.8–34.3)
MCH RBC QN AUTO: 29.2 PG (ref 26.8–34.3)
MCH RBC QN AUTO: 29.3 PG (ref 26.8–34.3)
MCH RBC QN AUTO: 29.5 PG (ref 26.8–34.3)
MCHC RBC AUTO-ENTMCNC: 31.1 G/DL (ref 31.4–37.4)
MCHC RBC AUTO-ENTMCNC: 31.5 G/DL (ref 31.4–37.4)
MCHC RBC AUTO-ENTMCNC: 31.7 G/DL (ref 31.4–37.4)
MCHC RBC AUTO-ENTMCNC: 32 G/DL (ref 31.4–37.4)
MCHC RBC AUTO-ENTMCNC: 32.1 G/DL (ref 31.4–37.4)
MCHC RBC AUTO-ENTMCNC: 32.2 G/DL (ref 31.4–37.4)
MCHC RBC AUTO-ENTMCNC: 32.4 G/DL (ref 31.4–37.4)
MCHC RBC AUTO-ENTMCNC: 32.5 G/DL (ref 31.4–37.4)
MCHC RBC AUTO-ENTMCNC: 32.8 G/DL (ref 31.4–37.4)
MCHC RBC AUTO-ENTMCNC: 33 G/DL (ref 31.4–37.4)
MCHC RBC AUTO-ENTMCNC: 33.1 G/DL (ref 31.4–37.4)
MCHC RBC AUTO-ENTMCNC: 33.6 G/DL (ref 31.4–37.4)
MCHC RBC AUTO-ENTMCNC: 33.7 G/DL (ref 31.4–37.4)
MCV RBC AUTO: 79 FL (ref 82–98)
MCV RBC AUTO: 80 FL (ref 82–98)
MCV RBC AUTO: 87 FL (ref 82–98)
MCV RBC AUTO: 89 FL (ref 82–98)
MCV RBC AUTO: 90 FL (ref 82–98)
MCV RBC AUTO: 91 FL (ref 82–98)
MCV RBC AUTO: 91 FL (ref 82–98)
MCV RBC AUTO: 92 FL (ref 82–98)
MICROCYTES BLD QL AUTO: PRESENT
MONOCYTES # BLD AUTO: 0.3 THOUSAND/UL (ref 0–1.22)
MONOCYTES # BLD AUTO: 0.69 THOUSAND/UL (ref 0–1.22)
MONOCYTES # BLD AUTO: 0.82 THOUSAND/ΜL (ref 0.17–1.22)
MONOCYTES # BLD AUTO: 0.89 THOUSAND/ΜL (ref 0.17–1.22)
MONOCYTES # BLD AUTO: 0.91 THOUSAND/ΜL (ref 0.17–1.22)
MONOCYTES # BLD AUTO: 0.91 THOUSAND/ΜL (ref 0.17–1.22)
MONOCYTES # BLD AUTO: 0.96 THOUSAND/ΜL (ref 0.17–1.22)
MONOCYTES # BLD AUTO: 0.97 THOUSAND/ΜL (ref 0.17–1.22)
MONOCYTES # BLD AUTO: 1.06 THOUSAND/ΜL (ref 0.17–1.22)
MONOCYTES # BLD AUTO: 1.12 THOUSAND/UL (ref 0–1.22)
MONOCYTES # BLD AUTO: 1.37 THOUSAND/ΜL (ref 0.17–1.22)
MONOCYTES # BLD AUTO: 1.38 THOUSAND/ΜL (ref 0.17–1.22)
MONOCYTES # BLD AUTO: 1.56 THOUSAND/ΜL (ref 0.17–1.22)
MONOCYTES # BLD AUTO: 1.75 THOUSAND/ΜL (ref 0.17–1.22)
MONOCYTES NFR BLD AUTO: 10 % (ref 4–12)
MONOCYTES NFR BLD AUTO: 10 % (ref 4–12)
MONOCYTES NFR BLD AUTO: 11 % (ref 4–12)
MONOCYTES NFR BLD AUTO: 11 % (ref 4–12)
MONOCYTES NFR BLD AUTO: 12 % (ref 4–12)
MONOCYTES NFR BLD AUTO: 6 % (ref 4–12)
MONOCYTES NFR BLD AUTO: 6 % (ref 4–12)
MONOCYTES NFR BLD AUTO: 7 % (ref 4–12)
MONOCYTES NFR BLD AUTO: 8 % (ref 4–12)
MONOCYTES NFR BLD AUTO: 8 % (ref 4–12)
MONOCYTES NFR BLD AUTO: 9 % (ref 4–12)
MONOCYTES NFR BLD: 1 % (ref 4–12)
MONOCYTES NFR BLD: 2 % (ref 4–12)
MONOCYTES NFR BLD: 4 % (ref 4–12)
MRSA NOSE QL CULT: NORMAL
MUCOUS THREADS UR QL AUTO: ABNORMAL
MYELOCYTES NFR BLD MANUAL: 1 % (ref 0–1)
MYELOCYTES NFR BLD MANUAL: 1 % (ref 0–1)
NEUTROPHILS # BLD AUTO: 11.31 THOUSANDS/ΜL (ref 1.85–7.62)
NEUTROPHILS # BLD AUTO: 11.52 THOUSANDS/ΜL (ref 1.85–7.62)
NEUTROPHILS # BLD AUTO: 13.33 THOUSANDS/ΜL (ref 1.85–7.62)
NEUTROPHILS # BLD AUTO: 14.65 THOUSANDS/ΜL (ref 1.85–7.62)
NEUTROPHILS # BLD AUTO: 23.21 THOUSANDS/ΜL (ref 1.85–7.62)
NEUTROPHILS # BLD AUTO: 5.16 THOUSANDS/ΜL (ref 1.85–7.62)
NEUTROPHILS # BLD AUTO: 5.51 THOUSANDS/ΜL (ref 1.85–7.62)
NEUTROPHILS # BLD AUTO: 5.9 THOUSANDS/ΜL (ref 1.85–7.62)
NEUTROPHILS # BLD AUTO: 6.14 THOUSANDS/ΜL (ref 1.85–7.62)
NEUTROPHILS # BLD AUTO: 7.34 THOUSANDS/ΜL (ref 1.85–7.62)
NEUTROPHILS # BLD AUTO: 7.84 THOUSANDS/ΜL (ref 1.85–7.62)
NEUTROPHILS # BLD MANUAL: 25.57 THOUSAND/UL (ref 1.85–7.62)
NEUTROPHILS # BLD MANUAL: 28.57 THOUSAND/UL (ref 1.85–7.62)
NEUTROPHILS # BLD MANUAL: 32.57 THOUSAND/UL (ref 1.85–7.62)
NEUTS BAND NFR BLD MANUAL: 1 % (ref 0–8)
NEUTS BAND NFR BLD MANUAL: 12 % (ref 0–8)
NEUTS SEG NFR BLD AUTO: 61 % (ref 43–75)
NEUTS SEG NFR BLD AUTO: 65 % (ref 43–75)
NEUTS SEG NFR BLD AUTO: 68 % (ref 43–75)
NEUTS SEG NFR BLD AUTO: 69 % (ref 43–75)
NEUTS SEG NFR BLD AUTO: 70 % (ref 43–75)
NEUTS SEG NFR BLD AUTO: 72 % (ref 43–75)
NEUTS SEG NFR BLD AUTO: 73 % (ref 43–75)
NEUTS SEG NFR BLD AUTO: 74 % (ref 43–75)
NEUTS SEG NFR BLD AUTO: 76 % (ref 43–75)
NEUTS SEG NFR BLD AUTO: 83 % (ref 43–75)
NEUTS SEG NFR BLD AUTO: 83 % (ref 43–75)
NEUTS SEG NFR BLD AUTO: 89 % (ref 43–75)
NEUTS SEG NFR BLD AUTO: 91 % (ref 43–75)
NEUTS SEG NFR BLD AUTO: 93 % (ref 43–75)
NITRITE UR QL STRIP: NEGATIVE
NON-SQ EPI CELLS URNS QL MICRO: ABNORMAL /HPF
NON-SQ EPI CELLS URNS QL MICRO: ABNORMAL /HPF
NON-SQ EPI CELLS URNS QL MICRO: NORMAL /HPF
NRBC BLD AUTO-RTO: 0 /100 WBCS
NT-PROBNP SERPL-MCNC: ABNORMAL PG/ML
NT-PROBNP SERPL-MCNC: ABNORMAL PG/ML
O2 CT BLDV-SCNC: 10.6 ML/DL
OVALOCYTES BLD QL SMEAR: PRESENT
OVALOCYTES BLD QL SMEAR: PRESENT
P AXIS: 103 DEGREES
P AXIS: 81 DEGREES
PCO2 BLD: 21 MMOL/L (ref 21–32)
PCO2 BLD: 31 MMOL/L (ref 21–32)
PCO2 BLD: 35 MMOL/L (ref 21–32)
PCO2 BLD: 48 MM HG (ref 42–50)
PCO2 BLD: 76.6 MM HG (ref 42–50)
PCO2 BLDV: 60.2 MM HG (ref 42–50)
PH BLD: 7.22 [PH] (ref 7.3–7.4)
PH BLD: 7.24 [PH] (ref 7.3–7.4)
PH BLDV: 7.32 [PH] (ref 7.3–7.4)
PH UR STRIP.AUTO: 5 [PH] (ref 4.5–8)
PH UR STRIP.AUTO: 5 [PH] (ref 4.5–8)
PH UR STRIP.AUTO: 5.5 [PH] (ref 4.5–8)
PH UR STRIP.AUTO: 6 [PH] (ref 4.5–8)
PHENYTOIN SERPL-MCNC: 16.1 UG/ML (ref 10–20)
PHENYTOIN SERPL-MCNC: 19.2 UG/ML (ref 10–20)
PHOSPHATE SERPL-MCNC: 3.4 MG/DL (ref 2.3–4.1)
PLATELET # BLD AUTO: 189 THOUSANDS/UL (ref 149–390)
PLATELET # BLD AUTO: 201 THOUSANDS/UL (ref 149–390)
PLATELET # BLD AUTO: 226 THOUSANDS/UL (ref 149–390)
PLATELET # BLD AUTO: 233 THOUSANDS/UL (ref 149–390)
PLATELET # BLD AUTO: 245 THOUSANDS/UL (ref 149–390)
PLATELET # BLD AUTO: 257 THOUSANDS/UL (ref 149–390)
PLATELET # BLD AUTO: 262 THOUSANDS/UL (ref 149–390)
PLATELET # BLD AUTO: 272 THOUSANDS/UL (ref 149–390)
PLATELET # BLD AUTO: 280 THOUSANDS/UL (ref 149–390)
PLATELET # BLD AUTO: 293 THOUSANDS/UL (ref 149–390)
PLATELET # BLD AUTO: 324 THOUSANDS/UL (ref 149–390)
PLATELET # BLD AUTO: 395 THOUSANDS/UL (ref 149–390)
PLATELET # BLD AUTO: 395 THOUSANDS/UL (ref 149–390)
PLATELET # BLD AUTO: 445 THOUSANDS/UL (ref 149–390)
PLATELET # BLD AUTO: 460 THOUSANDS/UL (ref 149–390)
PLATELET # BLD AUTO: 515 THOUSANDS/UL (ref 149–390)
PLATELET # BLD AUTO: 592 THOUSANDS/UL (ref 149–390)
PLATELET BLD QL SMEAR: ABNORMAL
PLATELET BLD QL SMEAR: ADEQUATE
PLATELET BLD QL SMEAR: ADEQUATE
PMV BLD AUTO: 10 FL (ref 8.9–12.7)
PMV BLD AUTO: 10.1 FL (ref 8.9–12.7)
PMV BLD AUTO: 10.1 FL (ref 8.9–12.7)
PMV BLD AUTO: 10.2 FL (ref 8.9–12.7)
PMV BLD AUTO: 10.6 FL (ref 8.9–12.7)
PMV BLD AUTO: 11.5 FL (ref 8.9–12.7)
PMV BLD AUTO: 11.6 FL (ref 8.9–12.7)
PMV BLD AUTO: 11.7 FL (ref 8.9–12.7)
PMV BLD AUTO: 11.8 FL (ref 8.9–12.7)
PMV BLD AUTO: 12 FL (ref 8.9–12.7)
PMV BLD AUTO: 12.1 FL (ref 8.9–12.7)
PMV BLD AUTO: 12.3 FL (ref 8.9–12.7)
PMV BLD AUTO: 12.3 FL (ref 8.9–12.7)
PMV BLD AUTO: 12.6 FL (ref 8.9–12.7)
PMV BLD AUTO: 12.6 FL (ref 8.9–12.7)
PMV BLD AUTO: 12.7 FL (ref 8.9–12.7)
PMV BLD AUTO: 9.9 FL (ref 8.9–12.7)
PO2 BLD: 102 MM HG (ref 35–45)
PO2 BLD: 33 MM HG (ref 35–45)
PO2 BLDV: 38.9 MM HG (ref 35–45)
POIKILOCYTOSIS BLD QL SMEAR: PRESENT
POTASSIUM BLD-SCNC: 3.5 MMOL/L (ref 3.5–5.3)
POTASSIUM BLD-SCNC: 3.5 MMOL/L (ref 3.5–5.3)
POTASSIUM BLD-SCNC: 4.6 MMOL/L (ref 3.5–5.3)
POTASSIUM SERPL-SCNC: 2.9 MMOL/L (ref 3.5–5.3)
POTASSIUM SERPL-SCNC: 3.1 MMOL/L (ref 3.5–5.3)
POTASSIUM SERPL-SCNC: 3.2 MMOL/L (ref 3.5–5.3)
POTASSIUM SERPL-SCNC: 3.3 MMOL/L (ref 3.5–5.3)
POTASSIUM SERPL-SCNC: 3.6 MMOL/L (ref 3.5–5.3)
POTASSIUM SERPL-SCNC: 3.8 MMOL/L (ref 3.5–5.3)
POTASSIUM SERPL-SCNC: 4 MMOL/L (ref 3.5–5.3)
POTASSIUM SERPL-SCNC: 4.2 MMOL/L (ref 3.5–5.3)
POTASSIUM SERPL-SCNC: 4.5 MMOL/L (ref 3.5–5.3)
POTASSIUM SERPL-SCNC: 4.6 MMOL/L (ref 3.5–5.3)
POTASSIUM SERPL-SCNC: 4.6 MMOL/L (ref 3.5–5.3)
POTASSIUM SERPL-SCNC: 4.7 MMOL/L (ref 3.5–5.3)
POTASSIUM SERPL-SCNC: 4.8 MMOL/L (ref 3.5–5.3)
PR INTERVAL: 208 MS
PROT SERPL-MCNC: 5.8 G/DL (ref 6.4–8.2)
PROT SERPL-MCNC: 6.6 G/DL (ref 6.4–8.2)
PROT SERPL-MCNC: 7.5 G/DL (ref 6.4–8.2)
PROT SERPL-MCNC: 7.6 G/DL (ref 6.4–8.2)
PROT UR STRIP-MCNC: ABNORMAL MG/DL
PROT UR STRIP-MCNC: ABNORMAL MG/DL
PROT UR STRIP-MCNC: NEGATIVE MG/DL
PROT UR STRIP-MCNC: NEGATIVE MG/DL
PROTHROMBIN TIME: 13.5 SECONDS (ref 12.1–14.4)
PROTHROMBIN TIME: 14.7 SECONDS (ref 12.1–14.4)
PROTHROMBIN TIME: 16.1 SECONDS (ref 12.1–14.4)
QRS AXIS: -23 DEGREES
QRS AXIS: 0 DEGREES
QRS AXIS: 31 DEGREES
QRS AXIS: 72 DEGREES
QRSD INTERVAL: 132 MS
QRSD INTERVAL: 4 MS
QRSD INTERVAL: 78 MS
QRSD INTERVAL: 88 MS
QT INTERVAL: 246 MS
QT INTERVAL: 326 MS
QT INTERVAL: 336 MS
QT INTERVAL: 92 MS
QTC INTERVAL: 138 MS
QTC INTERVAL: 399 MS
QTC INTERVAL: 407 MS
QTC INTERVAL: 411 MS
RBC # BLD AUTO: 3 MILLION/UL (ref 3.81–5.12)
RBC # BLD AUTO: 3 MILLION/UL (ref 3.81–5.12)
RBC # BLD AUTO: 3.06 MILLION/UL (ref 3.81–5.12)
RBC # BLD AUTO: 3.09 MILLION/UL (ref 3.81–5.12)
RBC # BLD AUTO: 3.17 MILLION/UL (ref 3.81–5.12)
RBC # BLD AUTO: 3.18 MILLION/UL (ref 3.81–5.12)
RBC # BLD AUTO: 3.21 MILLION/UL (ref 3.81–5.12)
RBC # BLD AUTO: 3.24 MILLION/UL (ref 3.81–5.12)
RBC # BLD AUTO: 3.3 MILLION/UL (ref 3.81–5.12)
RBC # BLD AUTO: 3.36 MILLION/UL (ref 3.81–5.12)
RBC # BLD AUTO: 3.36 MILLION/UL (ref 3.81–5.12)
RBC # BLD AUTO: 3.38 MILLION/UL (ref 3.81–5.12)
RBC # BLD AUTO: 3.63 MILLION/UL (ref 3.81–5.12)
RBC # BLD AUTO: 3.78 MILLION/UL (ref 3.81–5.12)
RBC # BLD AUTO: 3.79 MILLION/UL (ref 3.81–5.12)
RBC # BLD AUTO: 3.98 MILLION/UL (ref 3.81–5.12)
RBC # BLD AUTO: 4.39 MILLION/UL (ref 3.81–5.12)
RBC #/AREA URNS AUTO: ABNORMAL /HPF
RBC #/AREA URNS AUTO: ABNORMAL /HPF
RBC #/AREA URNS AUTO: NORMAL /HPF
RBC MORPH BLD: PRESENT
SAO2 % BLD FROM PO2: 50 % (ref 95–98)
SAO2 % BLD FROM PO2: 96 % (ref 95–98)
SODIUM BLD-SCNC: 135 MMOL/L (ref 136–145)
SODIUM BLD-SCNC: 135 MMOL/L (ref 136–145)
SODIUM BLD-SCNC: 140 MMOL/L (ref 136–145)
SODIUM SERPL-SCNC: 132 MMOL/L (ref 136–145)
SODIUM SERPL-SCNC: 133 MMOL/L (ref 136–145)
SODIUM SERPL-SCNC: 135 MMOL/L (ref 136–145)
SODIUM SERPL-SCNC: 135 MMOL/L (ref 136–145)
SODIUM SERPL-SCNC: 137 MMOL/L (ref 136–145)
SODIUM SERPL-SCNC: 137 MMOL/L (ref 136–145)
SODIUM SERPL-SCNC: 138 MMOL/L (ref 136–145)
SODIUM SERPL-SCNC: 139 MMOL/L (ref 136–145)
SODIUM SERPL-SCNC: 140 MMOL/L (ref 136–145)
SODIUM SERPL-SCNC: 141 MMOL/L (ref 136–145)
SODIUM SERPL-SCNC: 141 MMOL/L (ref 136–145)
SODIUM SERPL-SCNC: 144 MMOL/L (ref 136–145)
SODIUM SERPL-SCNC: 145 MMOL/L (ref 136–145)
SP GR UR STRIP.AUTO: 1.01 (ref 1–1.03)
SP GR UR STRIP.AUTO: 1.02 (ref 1–1.03)
SPECIMEN SOURCE: ABNORMAL
SPECIMEN SOURCE: NORMAL
T WAVE AXIS: -89 DEGREES
T WAVE AXIS: 49 DEGREES
T WAVE AXIS: 74 DEGREES
T WAVE AXIS: 75 DEGREES
TIBC SERPL-MCNC: 326 UG/DL (ref 250–450)
TRIGL SERPL-MCNC: 100 MG/DL
TROPONIN I BLD-MCNC: 0.01 NG/ML (ref 0–0.08)
TROPONIN I BLD-MCNC: 0.27 NG/ML (ref 0–0.08)
TROPONIN I SERPL-MCNC: 0.22 NG/ML
TROPONIN I SERPL-MCNC: 0.27 NG/ML
TROPONIN I SERPL-MCNC: 11.7 NG/ML
TROPONIN I SERPL-MCNC: 12.68 NG/ML
TROPONIN I SERPL-MCNC: 12.75 NG/ML
TROPONIN I SERPL-MCNC: 9.83 NG/ML
TSH SERPL DL<=0.05 MIU/L-ACNC: 0.89 UIU/ML (ref 0.36–3.74)
UROBILINOGEN UR QL STRIP.AUTO: 0.2 E.U./DL
VANCOMYCIN TROUGH SERPL-MCNC: 20.9 UG/ML (ref 10–20)
VARIANT LYMPHS # BLD AUTO: 1 %
VENTRICULAR RATE: 137 BPM
VENTRICULAR RATE: 158 BPM
VENTRICULAR RATE: 90 BPM
VENTRICULAR RATE: 94 BPM
VIT B12 SERPL-MCNC: 350 PG/ML (ref 100–900)
WBC # BLD AUTO: 10.09 THOUSAND/UL (ref 4.31–10.16)
WBC # BLD AUTO: 10.47 THOUSAND/UL (ref 4.31–10.16)
WBC # BLD AUTO: 13.82 THOUSAND/UL (ref 4.31–10.16)
WBC # BLD AUTO: 14.88 THOUSAND/UL (ref 4.31–10.16)
WBC # BLD AUTO: 15.14 THOUSAND/UL (ref 4.31–10.16)
WBC # BLD AUTO: 15.64 THOUSAND/UL (ref 4.31–10.16)
WBC # BLD AUTO: 16.72 THOUSAND/UL (ref 4.31–10.16)
WBC # BLD AUTO: 20.68 THOUSAND/UL (ref 4.31–10.16)
WBC # BLD AUTO: 22.06 THOUSAND/UL (ref 4.31–10.16)
WBC # BLD AUTO: 26.08 THOUSAND/UL (ref 4.31–10.16)
WBC # BLD AUTO: 28.1 THOUSAND/UL (ref 4.31–10.16)
WBC # BLD AUTO: 30.07 THOUSAND/UL (ref 4.31–10.16)
WBC # BLD AUTO: 34.65 THOUSAND/UL (ref 4.31–10.16)
WBC # BLD AUTO: 8.09 THOUSAND/UL (ref 4.31–10.16)
WBC # BLD AUTO: 8.35 THOUSAND/UL (ref 4.31–10.16)
WBC # BLD AUTO: 8.73 THOUSAND/UL (ref 4.31–10.16)
WBC # BLD AUTO: 8.98 THOUSAND/UL (ref 4.31–10.16)
WBC # BLD EST: NEGATIVE 10*3/UL
WBC #/AREA URNS AUTO: ABNORMAL /HPF
WBC #/AREA URNS AUTO: ABNORMAL /HPF
WBC #/AREA URNS AUTO: NORMAL /HPF

## 2017-01-01 PROCEDURE — 82805 BLOOD GASES W/O2 SATURATION: CPT | Performed by: EMERGENCY MEDICINE

## 2017-01-01 PROCEDURE — 80048 BASIC METABOLIC PNL TOTAL CA: CPT | Performed by: INTERNAL MEDICINE

## 2017-01-01 PROCEDURE — 97163 PT EVAL HIGH COMPLEX 45 MIN: CPT

## 2017-01-01 PROCEDURE — 82947 ASSAY GLUCOSE BLOOD QUANT: CPT

## 2017-01-01 PROCEDURE — 36415 COLL VENOUS BLD VENIPUNCTURE: CPT | Performed by: EMERGENCY MEDICINE

## 2017-01-01 PROCEDURE — 85025 COMPLETE CBC W/AUTO DIFF WBC: CPT | Performed by: EMERGENCY MEDICINE

## 2017-01-01 PROCEDURE — 81001 URINALYSIS AUTO W/SCOPE: CPT | Performed by: EMERGENCY MEDICINE

## 2017-01-01 PROCEDURE — 87040 BLOOD CULTURE FOR BACTERIA: CPT | Performed by: EMERGENCY MEDICINE

## 2017-01-01 PROCEDURE — 99285 EMERGENCY DEPT VISIT HI MDM: CPT

## 2017-01-01 PROCEDURE — 94640 AIRWAY INHALATION TREATMENT: CPT

## 2017-01-01 PROCEDURE — G8988 SELF CARE GOAL STATUS: HCPCS

## 2017-01-01 PROCEDURE — 74176 CT ABD & PELVIS W/O CONTRAST: CPT

## 2017-01-01 PROCEDURE — 82553 CREATINE MB FRACTION: CPT | Performed by: EMERGENCY MEDICINE

## 2017-01-01 PROCEDURE — 94660 CPAP INITIATION&MGMT: CPT

## 2017-01-01 PROCEDURE — 80048 BASIC METABOLIC PNL TOTAL CA: CPT | Performed by: EMERGENCY MEDICINE

## 2017-01-01 PROCEDURE — 85610 PROTHROMBIN TIME: CPT | Performed by: EMERGENCY MEDICINE

## 2017-01-01 PROCEDURE — 82553 CREATINE MB FRACTION: CPT | Performed by: PHYSICIAN ASSISTANT

## 2017-01-01 PROCEDURE — G8987 SELF CARE CURRENT STATUS: HCPCS

## 2017-01-01 PROCEDURE — 93005 ELECTROCARDIOGRAM TRACING: CPT | Performed by: EMERGENCY MEDICINE

## 2017-01-01 PROCEDURE — 80047 BASIC METABLC PNL IONIZED CA: CPT

## 2017-01-01 PROCEDURE — 80185 ASSAY OF PHENYTOIN TOTAL: CPT | Performed by: EMERGENCY MEDICINE

## 2017-01-01 PROCEDURE — 83550 IRON BINDING TEST: CPT | Performed by: INTERNAL MEDICINE

## 2017-01-01 PROCEDURE — 85610 PROTHROMBIN TIME: CPT | Performed by: INTERNAL MEDICINE

## 2017-01-01 PROCEDURE — 80048 BASIC METABOLIC PNL TOTAL CA: CPT | Performed by: PHYSICIAN ASSISTANT

## 2017-01-01 PROCEDURE — 83605 ASSAY OF LACTIC ACID: CPT | Performed by: EMERGENCY MEDICINE

## 2017-01-01 PROCEDURE — 84484 ASSAY OF TROPONIN QUANT: CPT | Performed by: NURSE PRACTITIONER

## 2017-01-01 PROCEDURE — 81002 URINALYSIS NONAUTO W/O SCOPE: CPT | Performed by: EMERGENCY MEDICINE

## 2017-01-01 PROCEDURE — 84484 ASSAY OF TROPONIN QUANT: CPT | Performed by: PHYSICIAN ASSISTANT

## 2017-01-01 PROCEDURE — 80061 LIPID PANEL: CPT | Performed by: PHYSICIAN ASSISTANT

## 2017-01-01 PROCEDURE — 70486 CT MAXILLOFACIAL W/O DYE: CPT

## 2017-01-01 PROCEDURE — 83735 ASSAY OF MAGNESIUM: CPT | Performed by: EMERGENCY MEDICINE

## 2017-01-01 PROCEDURE — 85025 COMPLETE CBC W/AUTO DIFF WBC: CPT | Performed by: INTERNAL MEDICINE

## 2017-01-01 PROCEDURE — 84484 ASSAY OF TROPONIN QUANT: CPT | Performed by: HOSPITALIST

## 2017-01-01 PROCEDURE — 83605 ASSAY OF LACTIC ACID: CPT | Performed by: PHYSICIAN ASSISTANT

## 2017-01-01 PROCEDURE — 84295 ASSAY OF SERUM SODIUM: CPT

## 2017-01-01 PROCEDURE — 82553 CREATINE MB FRACTION: CPT | Performed by: INTERNAL MEDICINE

## 2017-01-01 PROCEDURE — 82948 REAGENT STRIP/BLOOD GLUCOSE: CPT

## 2017-01-01 PROCEDURE — 97167 OT EVAL HIGH COMPLEX 60 MIN: CPT

## 2017-01-01 PROCEDURE — 85027 COMPLETE CBC AUTOMATED: CPT | Performed by: EMERGENCY MEDICINE

## 2017-01-01 PROCEDURE — 94760 N-INVAS EAR/PLS OXIMETRY 1: CPT

## 2017-01-01 PROCEDURE — 83735 ASSAY OF MAGNESIUM: CPT | Performed by: HOSPITALIST

## 2017-01-01 PROCEDURE — 84100 ASSAY OF PHOSPHORUS: CPT | Performed by: PHYSICIAN ASSISTANT

## 2017-01-01 PROCEDURE — 95951 HB EEG MONITORING/VIDEORECORD: CPT

## 2017-01-01 PROCEDURE — 80185 ASSAY OF PHENYTOIN TOTAL: CPT | Performed by: PHYSICIAN ASSISTANT

## 2017-01-01 PROCEDURE — 80053 COMPREHEN METABOLIC PANEL: CPT | Performed by: PHYSICIAN ASSISTANT

## 2017-01-01 PROCEDURE — 70450 CT HEAD/BRAIN W/O DYE: CPT

## 2017-01-01 PROCEDURE — G8978 MOBILITY CURRENT STATUS: HCPCS

## 2017-01-01 PROCEDURE — G8979 MOBILITY GOAL STATUS: HCPCS

## 2017-01-01 PROCEDURE — 85027 COMPLETE CBC AUTOMATED: CPT | Performed by: PHYSICIAN ASSISTANT

## 2017-01-01 PROCEDURE — 85730 THROMBOPLASTIN TIME PARTIAL: CPT | Performed by: FAMILY MEDICINE

## 2017-01-01 PROCEDURE — 85014 HEMATOCRIT: CPT

## 2017-01-01 PROCEDURE — 84132 ASSAY OF SERUM POTASSIUM: CPT

## 2017-01-01 PROCEDURE — 85730 THROMBOPLASTIN TIME PARTIAL: CPT | Performed by: INTERNAL MEDICINE

## 2017-01-01 PROCEDURE — 73610 X-RAY EXAM OF ANKLE: CPT

## 2017-01-01 PROCEDURE — 93306 TTE W/DOPPLER COMPLETE: CPT

## 2017-01-01 PROCEDURE — 96374 THER/PROPH/DIAG INJ IV PUSH: CPT

## 2017-01-01 PROCEDURE — 83036 HEMOGLOBIN GLYCOSYLATED A1C: CPT | Performed by: PHYSICIAN ASSISTANT

## 2017-01-01 PROCEDURE — 83540 ASSAY OF IRON: CPT | Performed by: INTERNAL MEDICINE

## 2017-01-01 PROCEDURE — 71010 HB CHEST X-RAY 1 VIEW FRONTAL: CPT

## 2017-01-01 PROCEDURE — 82550 ASSAY OF CK (CPK): CPT | Performed by: INTERNAL MEDICINE

## 2017-01-01 PROCEDURE — 87077 CULTURE AEROBIC IDENTIFY: CPT | Performed by: PHYSICIAN ASSISTANT

## 2017-01-01 PROCEDURE — 83735 ASSAY OF MAGNESIUM: CPT | Performed by: INTERNAL MEDICINE

## 2017-01-01 PROCEDURE — 85730 THROMBOPLASTIN TIME PARTIAL: CPT | Performed by: EMERGENCY MEDICINE

## 2017-01-01 PROCEDURE — 83880 ASSAY OF NATRIURETIC PEPTIDE: CPT | Performed by: EMERGENCY MEDICINE

## 2017-01-01 PROCEDURE — 82728 ASSAY OF FERRITIN: CPT | Performed by: INTERNAL MEDICINE

## 2017-01-01 PROCEDURE — 84443 ASSAY THYROID STIM HORMONE: CPT | Performed by: PHYSICIAN ASSISTANT

## 2017-01-01 PROCEDURE — 87040 BLOOD CULTURE FOR BACTERIA: CPT | Performed by: PHYSICIAN ASSISTANT

## 2017-01-01 PROCEDURE — 94760 N-INVAS EAR/PLS OXIMETRY 1: CPT | Performed by: SOCIAL WORKER

## 2017-01-01 PROCEDURE — 87186 SC STD MICRODIL/AGAR DIL: CPT | Performed by: PHYSICIAN ASSISTANT

## 2017-01-01 PROCEDURE — 82550 ASSAY OF CK (CPK): CPT | Performed by: PHYSICIAN ASSISTANT

## 2017-01-01 PROCEDURE — 71020 HB CHEST X-RAY 2VW FRONTAL&LATL: CPT

## 2017-01-01 PROCEDURE — 85027 COMPLETE CBC AUTOMATED: CPT | Performed by: HOSPITALIST

## 2017-01-01 PROCEDURE — 94664 DEMO&/EVAL PT USE INHALER: CPT

## 2017-01-01 PROCEDURE — 87086 URINE CULTURE/COLONY COUNT: CPT

## 2017-01-01 PROCEDURE — 82607 VITAMIN B-12: CPT | Performed by: INTERNAL MEDICINE

## 2017-01-01 PROCEDURE — 80053 COMPREHEN METABOLIC PANEL: CPT | Performed by: EMERGENCY MEDICINE

## 2017-01-01 PROCEDURE — 82330 ASSAY OF CALCIUM: CPT

## 2017-01-01 PROCEDURE — 82746 ASSAY OF FOLIC ACID SERUM: CPT | Performed by: INTERNAL MEDICINE

## 2017-01-01 PROCEDURE — 71010 HB CHEST X-RAY 1 VIEW FRONTAL (PORTABLE): CPT

## 2017-01-01 PROCEDURE — 85007 BL SMEAR W/DIFF WBC COUNT: CPT | Performed by: EMERGENCY MEDICINE

## 2017-01-01 PROCEDURE — 96360 HYDRATION IV INFUSION INIT: CPT

## 2017-01-01 PROCEDURE — 80048 BASIC METABOLIC PNL TOTAL CA: CPT | Performed by: HOSPITALIST

## 2017-01-01 PROCEDURE — 87086 URINE CULTURE/COLONY COUNT: CPT | Performed by: PHYSICIAN ASSISTANT

## 2017-01-01 PROCEDURE — 82803 BLOOD GASES ANY COMBINATION: CPT

## 2017-01-01 PROCEDURE — 93005 ELECTROCARDIOGRAM TRACING: CPT

## 2017-01-01 PROCEDURE — 96361 HYDRATE IV INFUSION ADD-ON: CPT

## 2017-01-01 PROCEDURE — 92610 EVALUATE SWALLOWING FUNCTION: CPT

## 2017-01-01 PROCEDURE — 81001 URINALYSIS AUTO W/SCOPE: CPT

## 2017-01-01 PROCEDURE — 87081 CULTURE SCREEN ONLY: CPT | Performed by: EMERGENCY MEDICINE

## 2017-01-01 PROCEDURE — 81003 URINALYSIS AUTO W/O SCOPE: CPT | Performed by: EMERGENCY MEDICINE

## 2017-01-01 PROCEDURE — 83735 ASSAY OF MAGNESIUM: CPT | Performed by: PHYSICIAN ASSISTANT

## 2017-01-01 PROCEDURE — 73030 X-RAY EXAM OF SHOULDER: CPT

## 2017-01-01 PROCEDURE — 84484 ASSAY OF TROPONIN QUANT: CPT

## 2017-01-01 PROCEDURE — 84484 ASSAY OF TROPONIN QUANT: CPT | Performed by: EMERGENCY MEDICINE

## 2017-01-01 PROCEDURE — 80202 ASSAY OF VANCOMYCIN: CPT | Performed by: PHYSICIAN ASSISTANT

## 2017-01-01 PROCEDURE — 83880 ASSAY OF NATRIURETIC PEPTIDE: CPT | Performed by: PHYSICIAN ASSISTANT

## 2017-01-01 PROCEDURE — 85007 BL SMEAR W/DIFF WBC COUNT: CPT | Performed by: PHYSICIAN ASSISTANT

## 2017-01-01 PROCEDURE — 82550 ASSAY OF CK (CPK): CPT | Performed by: EMERGENCY MEDICINE

## 2017-01-01 RX ORDER — FUROSEMIDE 10 MG/ML
20 INJECTION INTRAMUSCULAR; INTRAVENOUS ONCE
Status: COMPLETED | OUTPATIENT
Start: 2017-01-01 | End: 2017-01-01

## 2017-01-01 RX ORDER — CEPHALEXIN 250 MG/1
250 CAPSULE ORAL EVERY 8 HOURS SCHEDULED
Qty: 12 CAPSULE | Refills: 0 | Status: SHIPPED | OUTPATIENT
Start: 2017-01-01 | End: 2017-01-01 | Stop reason: HOSPADM

## 2017-01-01 RX ORDER — SCOLOPAMINE TRANSDERMAL SYSTEM 1 MG/1
1 PATCH, EXTENDED RELEASE TRANSDERMAL
Status: DISCONTINUED | OUTPATIENT
Start: 2017-01-01 | End: 2017-01-01 | Stop reason: HOSPADM

## 2017-01-01 RX ORDER — LIDOCAINE 50 MG/G
1 PATCH TOPICAL DAILY
Status: DISCONTINUED | OUTPATIENT
Start: 2017-01-01 | End: 2017-01-01 | Stop reason: HOSPADM

## 2017-01-01 RX ORDER — INSULIN GLARGINE 100 [IU]/ML
15 INJECTION, SOLUTION SUBCUTANEOUS
Status: DISCONTINUED | OUTPATIENT
Start: 2017-01-01 | End: 2017-01-01

## 2017-01-01 RX ORDER — SODIUM CHLORIDE 9 MG/ML
125 INJECTION, SOLUTION INTRAVENOUS CONTINUOUS
Status: DISCONTINUED | OUTPATIENT
Start: 2017-01-01 | End: 2017-01-01 | Stop reason: ALTCHOICE

## 2017-01-01 RX ORDER — METOPROLOL SUCCINATE 50 MG/1
50 TABLET, EXTENDED RELEASE ORAL DAILY
Status: DISCONTINUED | OUTPATIENT
Start: 2017-01-01 | End: 2017-01-01

## 2017-01-01 RX ORDER — MORPHINE SULFATE 2 MG/ML
2 INJECTION, SOLUTION INTRAMUSCULAR; INTRAVENOUS EVERY 6 HOURS PRN
Status: DISCONTINUED | OUTPATIENT
Start: 2017-01-01 | End: 2017-01-01

## 2017-01-01 RX ORDER — METHOCARBAMOL 500 MG/1
500 TABLET, FILM COATED ORAL EVERY 6 HOURS PRN
Status: DISCONTINUED | OUTPATIENT
Start: 2017-01-01 | End: 2017-01-01

## 2017-01-01 RX ORDER — FUROSEMIDE 40 MG/1
40 TABLET ORAL
Status: DISCONTINUED | OUTPATIENT
Start: 2017-01-01 | End: 2017-01-01 | Stop reason: HOSPADM

## 2017-01-01 RX ORDER — FUROSEMIDE 40 MG/1
40 TABLET ORAL DAILY
Qty: 30 TABLET | Refills: 0 | Status: SHIPPED | OUTPATIENT
Start: 2017-01-01 | End: 2017-01-01

## 2017-01-01 RX ORDER — ALBUTEROL SULFATE 2.5 MG/3ML
2.5 SOLUTION RESPIRATORY (INHALATION) EVERY 4 HOURS PRN
Status: DISCONTINUED | OUTPATIENT
Start: 2017-01-01 | End: 2017-01-01 | Stop reason: HOSPADM

## 2017-01-01 RX ORDER — ACETAMINOPHEN 325 MG/1
650 TABLET ORAL EVERY 6 HOURS PRN
Status: DISCONTINUED | OUTPATIENT
Start: 2017-01-01 | End: 2017-01-01 | Stop reason: HOSPADM

## 2017-01-01 RX ORDER — INSULIN GLARGINE 100 [IU]/ML
24 INJECTION, SOLUTION SUBCUTANEOUS
Status: DISCONTINUED | OUTPATIENT
Start: 2017-01-01 | End: 2017-01-01 | Stop reason: HOSPADM

## 2017-01-01 RX ORDER — ALBUTEROL SULFATE 2.5 MG/3ML
2.5 SOLUTION RESPIRATORY (INHALATION) EVERY 4 HOURS PRN
Status: DISCONTINUED | OUTPATIENT
Start: 2017-01-01 | End: 2017-01-01 | Stop reason: SDUPTHER

## 2017-01-01 RX ORDER — HYDRALAZINE HYDROCHLORIDE 25 MG/1
25 TABLET, FILM COATED ORAL EVERY 8 HOURS SCHEDULED
Status: DISCONTINUED | OUTPATIENT
Start: 2017-01-01 | End: 2017-01-01 | Stop reason: HOSPADM

## 2017-01-01 RX ORDER — DOCUSATE SODIUM 100 MG/1
100 CAPSULE, LIQUID FILLED ORAL 2 TIMES DAILY
Status: DISCONTINUED | OUTPATIENT
Start: 2017-01-01 | End: 2017-01-01 | Stop reason: HOSPADM

## 2017-01-01 RX ORDER — LORAZEPAM 2 MG/ML
0.5 INJECTION INTRAMUSCULAR ONCE
Status: COMPLETED | OUTPATIENT
Start: 2017-01-01 | End: 2017-01-01

## 2017-01-01 RX ORDER — POTASSIUM CHLORIDE 14.9 MG/ML
20 INJECTION INTRAVENOUS ONCE
Status: COMPLETED | OUTPATIENT
Start: 2017-01-01 | End: 2017-01-01

## 2017-01-01 RX ORDER — MAGNESIUM HYDROXIDE/ALUMINUM HYDROXICE/SIMETHICONE 120; 1200; 1200 MG/30ML; MG/30ML; MG/30ML
30 SUSPENSION ORAL EVERY 6 HOURS PRN
Status: DISCONTINUED | OUTPATIENT
Start: 2017-01-01 | End: 2017-01-01 | Stop reason: HOSPADM

## 2017-01-01 RX ORDER — FENTANYL CITRATE 50 UG/ML
50 INJECTION, SOLUTION INTRAMUSCULAR; INTRAVENOUS
Status: DISCONTINUED | OUTPATIENT
Start: 2017-01-01 | End: 2017-01-01

## 2017-01-01 RX ORDER — SODIUM CHLORIDE, SODIUM GLUCONATE, SODIUM ACETATE, POTASSIUM CHLORIDE, MAGNESIUM CHLORIDE, SODIUM PHOSPHATE, DIBASIC, AND POTASSIUM PHOSPHATE .53; .5; .37; .037; .03; .012; .00082 G/100ML; G/100ML; G/100ML; G/100ML; G/100ML; G/100ML; G/100ML
100 INJECTION, SOLUTION INTRAVENOUS CONTINUOUS
Status: DISCONTINUED | OUTPATIENT
Start: 2017-01-01 | End: 2017-01-01

## 2017-01-01 RX ORDER — ACETAMINOPHEN 325 MG/1
650 TABLET ORAL EVERY 6 HOURS SCHEDULED
Status: DISCONTINUED | OUTPATIENT
Start: 2017-01-01 | End: 2017-01-01 | Stop reason: HOSPADM

## 2017-01-01 RX ORDER — ASPIRIN 325 MG
325 TABLET ORAL ONCE
Status: COMPLETED | OUTPATIENT
Start: 2017-01-01 | End: 2017-01-01

## 2017-01-01 RX ORDER — HEPARIN SODIUM 1000 [USP'U]/ML
4000 INJECTION, SOLUTION INTRAVENOUS; SUBCUTANEOUS ONCE
Status: COMPLETED | OUTPATIENT
Start: 2017-01-01 | End: 2017-01-01

## 2017-01-01 RX ORDER — ASPIRIN 325 MG
325 TABLET, DELAYED RELEASE (ENTERIC COATED) ORAL DAILY
Status: DISCONTINUED | OUTPATIENT
Start: 2017-01-01 | End: 2017-01-01 | Stop reason: HOSPADM

## 2017-01-01 RX ORDER — ACETAMINOPHEN 325 MG/1
650 TABLET ORAL EVERY 4 HOURS PRN
Status: DISCONTINUED | OUTPATIENT
Start: 2017-01-01 | End: 2017-01-01 | Stop reason: HOSPADM

## 2017-01-01 RX ORDER — MAGNESIUM SULFATE HEPTAHYDRATE 40 MG/ML
2 INJECTION, SOLUTION INTRAVENOUS ONCE
Status: COMPLETED | OUTPATIENT
Start: 2017-01-01 | End: 2017-01-01

## 2017-01-01 RX ORDER — TRAMADOL HYDROCHLORIDE 50 MG/1
50 TABLET ORAL EVERY 6 HOURS PRN
Qty: 30 TABLET | Refills: 0 | Status: ON HOLD | OUTPATIENT
Start: 2017-01-01 | End: 2017-01-01

## 2017-01-01 RX ORDER — POTASSIUM CHLORIDE 20MEQ/15ML
10 LIQUID (ML) ORAL ONCE
Status: COMPLETED | OUTPATIENT
Start: 2017-01-01 | End: 2017-01-01

## 2017-01-01 RX ORDER — POTASSIUM CHLORIDE 14.9 MG/ML
20 INJECTION INTRAVENOUS ONCE
Status: DISCONTINUED | OUTPATIENT
Start: 2017-01-01 | End: 2017-01-01

## 2017-01-01 RX ORDER — MORPHINE SULFATE 100 MG/5ML
10 SOLUTION ORAL EVERY 6 HOURS SCHEDULED
Status: DISCONTINUED | OUTPATIENT
Start: 2017-01-01 | End: 2017-01-01 | Stop reason: HOSPADM

## 2017-01-01 RX ORDER — FUROSEMIDE 40 MG/1
40 TABLET ORAL DAILY
Status: DISCONTINUED | OUTPATIENT
Start: 2017-01-01 | End: 2017-01-01 | Stop reason: HOSPADM

## 2017-01-01 RX ORDER — HEPARIN SODIUM 5000 [USP'U]/ML
5000 INJECTION, SOLUTION INTRAVENOUS; SUBCUTANEOUS EVERY 8 HOURS SCHEDULED
Status: DISCONTINUED | OUTPATIENT
Start: 2017-01-01 | End: 2017-01-01

## 2017-01-01 RX ORDER — POTASSIUM CHLORIDE 750 MG/1
10 TABLET, EXTENDED RELEASE ORAL DAILY
Status: DISCONTINUED | OUTPATIENT
Start: 2017-01-01 | End: 2017-01-01 | Stop reason: HOSPADM

## 2017-01-01 RX ORDER — TRAMADOL HYDROCHLORIDE 50 MG/1
50 TABLET ORAL EVERY 6 HOURS PRN
Qty: 30 TABLET | Refills: 0 | Status: SHIPPED | OUTPATIENT
Start: 2017-01-01 | End: 2017-01-01

## 2017-01-01 RX ORDER — POTASSIUM CHLORIDE 20 MEQ/1
40 TABLET, EXTENDED RELEASE ORAL ONCE
Status: COMPLETED | OUTPATIENT
Start: 2017-01-01 | End: 2017-01-01

## 2017-01-01 RX ORDER — DONEPEZIL HYDROCHLORIDE 5 MG/1
5 TABLET, FILM COATED ORAL
COMMUNITY

## 2017-01-01 RX ORDER — GLYCOPYRROLATE 1 MG/1
1 TABLET ORAL 3 TIMES DAILY
Status: DISCONTINUED | OUTPATIENT
Start: 2017-01-01 | End: 2017-01-01

## 2017-01-01 RX ORDER — SODIUM CHLORIDE, SODIUM GLUCONATE, SODIUM ACETATE, POTASSIUM CHLORIDE, MAGNESIUM CHLORIDE, SODIUM PHOSPHATE, DIBASIC, AND POTASSIUM PHOSPHATE .53; .5; .37; .037; .03; .012; .00082 G/100ML; G/100ML; G/100ML; G/100ML; G/100ML; G/100ML; G/100ML
1000 INJECTION, SOLUTION INTRAVENOUS ONCE
Status: COMPLETED | OUTPATIENT
Start: 2017-01-01 | End: 2017-01-01

## 2017-01-01 RX ORDER — HYDRALAZINE HYDROCHLORIDE 20 MG/ML
5 INJECTION INTRAMUSCULAR; INTRAVENOUS EVERY 6 HOURS PRN
Status: DISCONTINUED | OUTPATIENT
Start: 2017-01-01 | End: 2017-01-01

## 2017-01-01 RX ORDER — LORAZEPAM 2 MG/ML
0.5 INJECTION INTRAMUSCULAR
Status: DISCONTINUED | OUTPATIENT
Start: 2017-01-01 | End: 2017-01-01

## 2017-01-01 RX ORDER — FUROSEMIDE 10 MG/ML
20 INJECTION INTRAMUSCULAR; INTRAVENOUS DAILY
Status: DISCONTINUED | OUTPATIENT
Start: 2017-01-01 | End: 2017-01-01

## 2017-01-01 RX ORDER — BISACODYL 10 MG
10 SUPPOSITORY, RECTAL RECTAL DAILY PRN
Status: DISCONTINUED | OUTPATIENT
Start: 2017-01-01 | End: 2017-01-01 | Stop reason: HOSPADM

## 2017-01-01 RX ORDER — HYDRALAZINE HYDROCHLORIDE 20 MG/ML
10 INJECTION INTRAMUSCULAR; INTRAVENOUS EVERY 4 HOURS PRN
Status: DISCONTINUED | OUTPATIENT
Start: 2017-01-01 | End: 2017-01-01 | Stop reason: HOSPADM

## 2017-01-01 RX ORDER — POTASSIUM CHLORIDE 750 MG/1
10 CAPSULE, EXTENDED RELEASE ORAL DAILY
COMMUNITY

## 2017-01-01 RX ORDER — FUROSEMIDE 10 MG/ML
20 SYRINGE (ML) INJECTION CONTINUOUS
Status: DISCONTINUED | OUTPATIENT
Start: 2017-01-01 | End: 2017-01-01

## 2017-01-01 RX ORDER — METOLAZONE 2.5 MG/1
2.5 TABLET ORAL DAILY
COMMUNITY

## 2017-01-01 RX ORDER — HYDRALAZINE HYDROCHLORIDE 10 MG/1
10 TABLET, FILM COATED ORAL EVERY 8 HOURS SCHEDULED
Status: DISCONTINUED | OUTPATIENT
Start: 2017-01-01 | End: 2017-01-01

## 2017-01-01 RX ORDER — NITROGLYCERIN 20 MG/100ML
10 INJECTION INTRAVENOUS
Status: DISCONTINUED | OUTPATIENT
Start: 2017-01-01 | End: 2017-01-01

## 2017-01-01 RX ORDER — FUROSEMIDE 10 MG/ML
40 INJECTION INTRAMUSCULAR; INTRAVENOUS 2 TIMES DAILY
Status: DISCONTINUED | OUTPATIENT
Start: 2017-01-01 | End: 2017-01-01

## 2017-01-01 RX ORDER — GLIMEPIRIDE 4 MG/1
4 TABLET ORAL 2 TIMES DAILY
COMMUNITY

## 2017-01-01 RX ORDER — FENTANYL CITRATE 50 UG/ML
INJECTION, SOLUTION INTRAMUSCULAR; INTRAVENOUS
Status: COMPLETED
Start: 2017-01-01 | End: 2017-01-01

## 2017-01-01 RX ORDER — PHENYTOIN SODIUM 50 MG/ML
100 INJECTION, SOLUTION INTRAMUSCULAR; INTRAVENOUS EVERY 12 HOURS SCHEDULED
Status: DISCONTINUED | OUTPATIENT
Start: 2017-01-01 | End: 2017-01-01

## 2017-01-01 RX ORDER — LORAZEPAM 1 MG/1
1 TABLET ORAL
Status: DISCONTINUED | OUTPATIENT
Start: 2017-01-01 | End: 2017-01-01

## 2017-01-01 RX ORDER — FUROSEMIDE 10 MG/ML
40 INJECTION INTRAMUSCULAR; INTRAVENOUS ONCE
Status: DISCONTINUED | OUTPATIENT
Start: 2017-01-01 | End: 2017-01-01

## 2017-01-01 RX ORDER — HEPARIN SODIUM 5000 [USP'U]/ML
5000 INJECTION, SOLUTION INTRAVENOUS; SUBCUTANEOUS EVERY 8 HOURS SCHEDULED
Status: DISCONTINUED | OUTPATIENT
Start: 2017-01-01 | End: 2017-01-01 | Stop reason: HOSPADM

## 2017-01-01 RX ORDER — HYDRALAZINE HYDROCHLORIDE 20 MG/ML
5 INJECTION INTRAMUSCULAR; INTRAVENOUS EVERY 6 HOURS PRN
Status: DISCONTINUED | OUTPATIENT
Start: 2017-01-01 | End: 2017-01-01 | Stop reason: HOSPADM

## 2017-01-01 RX ORDER — SODIUM CHLORIDE 9 MG/ML
75 INJECTION, SOLUTION INTRAVENOUS CONTINUOUS
Status: DISCONTINUED | OUTPATIENT
Start: 2017-01-01 | End: 2017-01-01

## 2017-01-01 RX ORDER — METOPROLOL SUCCINATE 50 MG/1
50 TABLET, EXTENDED RELEASE ORAL DAILY
Status: DISCONTINUED | OUTPATIENT
Start: 2017-01-01 | End: 2017-01-01 | Stop reason: HOSPADM

## 2017-01-01 RX ORDER — ACETAMINOPHEN 325 MG/1
325 TABLET ORAL EVERY 6 HOURS PRN
Status: ON HOLD | COMMUNITY
End: 2017-01-01

## 2017-01-01 RX ORDER — METOLAZONE 2.5 MG/1
2.5 TABLET ORAL DAILY
Status: DISCONTINUED | OUTPATIENT
Start: 2017-01-01 | End: 2017-01-01 | Stop reason: HOSPADM

## 2017-01-01 RX ORDER — ACETAMINOPHEN 650 MG/1
325 SUPPOSITORY RECTAL EVERY 4 HOURS PRN
Status: DISCONTINUED | OUTPATIENT
Start: 2017-01-01 | End: 2017-01-01 | Stop reason: HOSPADM

## 2017-01-01 RX ORDER — METOPROLOL SUCCINATE 50 MG/1
50 TABLET, EXTENDED RELEASE ORAL DAILY
COMMUNITY

## 2017-01-01 RX ORDER — FENTANYL CITRATE 50 UG/ML
50 INJECTION, SOLUTION INTRAMUSCULAR; INTRAVENOUS
Status: DISCONTINUED | OUTPATIENT
Start: 2017-01-01 | End: 2017-01-01 | Stop reason: HOSPADM

## 2017-01-01 RX ORDER — NITROGLYCERIN 20 MG/100ML
5-200 INJECTION INTRAVENOUS
Status: DISCONTINUED | OUTPATIENT
Start: 2017-01-01 | End: 2017-01-01

## 2017-01-01 RX ORDER — INSULIN GLARGINE 100 [IU]/ML
20 INJECTION, SOLUTION SUBCUTANEOUS
Status: DISCONTINUED | OUTPATIENT
Start: 2017-01-01 | End: 2017-01-01

## 2017-01-01 RX ORDER — LOSARTAN POTASSIUM 100 MG/1
100 TABLET ORAL DAILY
COMMUNITY
End: 2017-01-01 | Stop reason: HOSPADM

## 2017-01-01 RX ORDER — ACETAMINOPHEN 325 MG/1
975 TABLET ORAL EVERY 8 HOURS SCHEDULED
Status: DISCONTINUED | OUTPATIENT
Start: 2017-01-01 | End: 2017-01-01 | Stop reason: HOSPADM

## 2017-01-01 RX ORDER — CALCIUM CARBONATE 200(500)MG
1000 TABLET,CHEWABLE ORAL DAILY PRN
Status: DISCONTINUED | OUTPATIENT
Start: 2017-01-01 | End: 2017-01-01 | Stop reason: HOSPADM

## 2017-01-01 RX ORDER — HEPARIN SODIUM 1000 [USP'U]/ML
4000 INJECTION, SOLUTION INTRAVENOUS; SUBCUTANEOUS AS NEEDED
Status: DISCONTINUED | OUTPATIENT
Start: 2017-01-01 | End: 2017-01-01

## 2017-01-01 RX ORDER — GLIMEPIRIDE 2 MG/1
4 TABLET ORAL 2 TIMES DAILY
Status: DISCONTINUED | OUTPATIENT
Start: 2017-01-01 | End: 2017-01-01 | Stop reason: HOSPADM

## 2017-01-01 RX ORDER — ALBUTEROL SULFATE 2.5 MG/3ML
2.5 SOLUTION RESPIRATORY (INHALATION) EVERY 4 HOURS PRN
Qty: 75 ML | Refills: 0 | Status: SHIPPED | OUTPATIENT
Start: 2017-01-01 | End: 2017-01-01

## 2017-01-01 RX ORDER — DONEPEZIL HYDROCHLORIDE 5 MG/1
5 TABLET, FILM COATED ORAL
Status: DISCONTINUED | OUTPATIENT
Start: 2017-01-01 | End: 2017-01-01 | Stop reason: HOSPADM

## 2017-01-01 RX ORDER — ONDANSETRON 2 MG/ML
4 INJECTION INTRAMUSCULAR; INTRAVENOUS EVERY 6 HOURS PRN
Status: DISCONTINUED | OUTPATIENT
Start: 2017-01-01 | End: 2017-01-01 | Stop reason: HOSPADM

## 2017-01-01 RX ORDER — CHLORHEXIDINE GLUCONATE 0.12 MG/ML
15 RINSE ORAL EVERY 12 HOURS SCHEDULED
Status: DISCONTINUED | OUTPATIENT
Start: 2017-01-01 | End: 2017-01-01

## 2017-01-01 RX ORDER — HEPARIN SODIUM 1000 [USP'U]/ML
2000 INJECTION, SOLUTION INTRAVENOUS; SUBCUTANEOUS AS NEEDED
Status: DISCONTINUED | OUTPATIENT
Start: 2017-01-01 | End: 2017-01-01

## 2017-01-01 RX ORDER — TRAMADOL HYDROCHLORIDE 50 MG/1
50 TABLET ORAL EVERY 6 HOURS PRN
Status: DISCONTINUED | OUTPATIENT
Start: 2017-01-01 | End: 2017-01-01 | Stop reason: HOSPADM

## 2017-01-01 RX ORDER — ACETAMINOPHEN 325 MG/1
325 TABLET ORAL EVERY 6 HOURS PRN
Qty: 30 TABLET | Refills: 0 | Status: SHIPPED | OUTPATIENT
Start: 2017-01-01

## 2017-01-01 RX ORDER — SENNOSIDES 8.6 MG
1 TABLET ORAL DAILY
Status: DISCONTINUED | OUTPATIENT
Start: 2017-01-01 | End: 2017-01-01 | Stop reason: HOSPADM

## 2017-01-01 RX ORDER — LORAZEPAM 2 MG/ML
1 INJECTION INTRAMUSCULAR ONCE
Status: DISCONTINUED | OUTPATIENT
Start: 2017-01-01 | End: 2017-01-01

## 2017-01-01 RX ORDER — MORPHINE SULFATE 100 MG/5ML
10 SOLUTION ORAL
Status: DISCONTINUED | OUTPATIENT
Start: 2017-01-01 | End: 2017-01-01 | Stop reason: HOSPADM

## 2017-01-01 RX ORDER — ACETAMINOPHEN 325 MG/1
975 TABLET ORAL EVERY 8 HOURS
Qty: 30 TABLET | Refills: 0 | Status: SHIPPED | OUTPATIENT
Start: 2017-01-01 | End: 2017-01-01 | Stop reason: ALTCHOICE

## 2017-01-01 RX ORDER — LORAZEPAM 0.5 MG/1
0.5 TABLET ORAL EVERY 2 HOUR PRN
Status: DISCONTINUED | OUTPATIENT
Start: 2017-01-01 | End: 2017-01-01 | Stop reason: HOSPADM

## 2017-01-01 RX ORDER — MORPHINE SULFATE 2 MG/ML
1 INJECTION, SOLUTION INTRAMUSCULAR; INTRAVENOUS EVERY 4 HOURS PRN
Status: DISCONTINUED | OUTPATIENT
Start: 2017-01-01 | End: 2017-01-01 | Stop reason: HOSPADM

## 2017-01-01 RX ORDER — FUROSEMIDE 10 MG/ML
40 INJECTION INTRAMUSCULAR; INTRAVENOUS ONCE
Status: COMPLETED | OUTPATIENT
Start: 2017-01-01 | End: 2017-01-01

## 2017-01-01 RX ORDER — FUROSEMIDE 40 MG/1
40 TABLET ORAL 2 TIMES DAILY
Status: ON HOLD | COMMUNITY
End: 2017-01-01

## 2017-01-01 RX ORDER — CARVEDILOL 25 MG/1
25 TABLET ORAL 2 TIMES DAILY WITH MEALS
Status: DISCONTINUED | OUTPATIENT
Start: 2017-01-01 | End: 2017-01-01 | Stop reason: HOSPADM

## 2017-01-01 RX ORDER — CEPHALEXIN 250 MG/1
250 CAPSULE ORAL EVERY 8 HOURS SCHEDULED
Status: DISCONTINUED | OUTPATIENT
Start: 2017-01-01 | End: 2017-01-01 | Stop reason: HOSPADM

## 2017-01-01 RX ORDER — FUROSEMIDE 40 MG/1
40 TABLET ORAL DAILY
Status: DISCONTINUED | OUTPATIENT
Start: 2017-01-01 | End: 2017-01-01

## 2017-01-01 RX ORDER — FENTANYL 25 UG/H
1 PATCH TRANSDERMAL
COMMUNITY

## 2017-01-01 RX ORDER — FENTANYL CITRATE 50 UG/ML
25 INJECTION, SOLUTION INTRAMUSCULAR; INTRAVENOUS
Status: DISCONTINUED | OUTPATIENT
Start: 2017-01-01 | End: 2017-01-01

## 2017-01-01 RX ORDER — LEVETIRACETAM 500 MG/1
1000 TABLET ORAL EVERY 12 HOURS SCHEDULED
Status: DISCONTINUED | OUTPATIENT
Start: 2017-01-01 | End: 2017-01-01

## 2017-01-01 RX ORDER — METOPROLOL TARTRATE 5 MG/5ML
5 INJECTION INTRAVENOUS EVERY 6 HOURS PRN
Status: DISCONTINUED | OUTPATIENT
Start: 2017-01-01 | End: 2017-01-01

## 2017-01-01 RX ORDER — HEPARIN SODIUM 10000 [USP'U]/100ML
3-20 INJECTION, SOLUTION INTRAVENOUS
Status: DISCONTINUED | OUTPATIENT
Start: 2017-01-01 | End: 2017-01-01

## 2017-01-01 RX ORDER — HYDRALAZINE HYDROCHLORIDE 25 MG/1
25 TABLET, FILM COATED ORAL EVERY 8 HOURS SCHEDULED
Qty: 90 TABLET | Refills: 0 | Status: SHIPPED | OUTPATIENT
Start: 2017-01-01 | End: 2017-01-01 | Stop reason: ALTCHOICE

## 2017-01-01 RX ADMIN — MORPHINE SULFATE 10 MG: 100 SOLUTION ORAL at 11:09

## 2017-01-01 RX ADMIN — ALBUTEROL SULFATE 2.5 MG: 2.5 SOLUTION RESPIRATORY (INHALATION) at 23:00

## 2017-01-01 RX ADMIN — PIPERACILLIN SODIUM,TAZOBACTAM SODIUM 2.25 G: 2; .25 INJECTION, POWDER, FOR SOLUTION INTRAVENOUS at 22:46

## 2017-01-01 RX ADMIN — SENNOSIDES 8.6 MG: 8.6 TABLET, FILM COATED ORAL at 08:05

## 2017-01-01 RX ADMIN — HYDRALAZINE HYDROCHLORIDE 10 MG: 10 TABLET, FILM COATED ORAL at 06:26

## 2017-01-01 RX ADMIN — ACETAMINOPHEN 650 MG: 325 TABLET, FILM COATED ORAL at 11:57

## 2017-01-01 RX ADMIN — FENTANYL CITRATE 25 MCG: 50 INJECTION, SOLUTION INTRAMUSCULAR; INTRAVENOUS at 08:50

## 2017-01-01 RX ADMIN — HEPARIN SODIUM 5000 UNITS: 5000 INJECTION, SOLUTION INTRAVENOUS; SUBCUTANEOUS at 05:43

## 2017-01-01 RX ADMIN — NITROGLYCERIN 100 MCG/MIN: 20 INJECTION INTRAVENOUS at 23:40

## 2017-01-01 RX ADMIN — HYDRALAZINE HYDROCHLORIDE 10 MG: 10 TABLET, FILM COATED ORAL at 22:34

## 2017-01-01 RX ADMIN — INSULIN LISPRO 1 UNITS: 100 INJECTION, SOLUTION INTRAVENOUS; SUBCUTANEOUS at 08:34

## 2017-01-01 RX ADMIN — FENTANYL CITRATE 50 MCG: 50 INJECTION INTRAMUSCULAR; INTRAVENOUS at 16:00

## 2017-01-01 RX ADMIN — INSULIN LISPRO 3 UNITS: 100 INJECTION, SOLUTION INTRAVENOUS; SUBCUTANEOUS at 11:44

## 2017-01-01 RX ADMIN — PIPERACILLIN SODIUM,TAZOBACTAM SODIUM 2.25 G: 2; .25 INJECTION, POWDER, FOR SOLUTION INTRAVENOUS at 04:30

## 2017-01-01 RX ADMIN — FENTANYL CITRATE 50 MCG: 50 INJECTION, SOLUTION INTRAMUSCULAR; INTRAVENOUS at 22:09

## 2017-01-01 RX ADMIN — INSULIN LISPRO 1 UNITS: 100 INJECTION, SOLUTION INTRAVENOUS; SUBCUTANEOUS at 08:20

## 2017-01-01 RX ADMIN — PIPERACILLIN SODIUM,TAZOBACTAM SODIUM 2.25 G: 2; .25 INJECTION, POWDER, FOR SOLUTION INTRAVENOUS at 22:20

## 2017-01-01 RX ADMIN — INSULIN LISPRO 3 UNITS: 100 INJECTION, SOLUTION INTRAVENOUS; SUBCUTANEOUS at 12:18

## 2017-01-01 RX ADMIN — POTASSIUM CHLORIDE 10 MEQ: 750 TABLET, EXTENDED RELEASE ORAL at 08:33

## 2017-01-01 RX ADMIN — SODIUM CHLORIDE, SODIUM GLUCONATE, SODIUM ACETATE, POTASSIUM CHLORIDE, MAGNESIUM CHLORIDE, SODIUM PHOSPHATE, DIBASIC, AND POTASSIUM PHOSPHATE 1000 ML: .53; .5; .37; .037; .03; .012; .00082 INJECTION, SOLUTION INTRAVENOUS at 00:02

## 2017-01-01 RX ADMIN — FUROSEMIDE 40 MG: 40 TABLET ORAL at 16:21

## 2017-01-01 RX ADMIN — DOCUSATE SODIUM 100 MG: 100 CAPSULE, LIQUID FILLED ORAL at 08:06

## 2017-01-01 RX ADMIN — INSULIN LISPRO 2 UNITS: 100 INJECTION, SOLUTION INTRAVENOUS; SUBCUTANEOUS at 11:35

## 2017-01-01 RX ADMIN — MORPHINE SULFATE 10 MG: 100 SOLUTION ORAL at 02:45

## 2017-01-01 RX ADMIN — HYDRALAZINE HYDROCHLORIDE 10 MG: 10 TABLET, FILM COATED ORAL at 14:18

## 2017-01-01 RX ADMIN — ACETAMINOPHEN 975 MG: 325 TABLET ORAL at 15:45

## 2017-01-01 RX ADMIN — HEPARIN SODIUM 5000 UNITS: 5000 INJECTION, SOLUTION INTRAVENOUS; SUBCUTANEOUS at 11:27

## 2017-01-01 RX ADMIN — SODIUM CHLORIDE 750 MG: 900 INJECTION, SOLUTION INTRAVENOUS at 21:07

## 2017-01-01 RX ADMIN — PHENYTOIN SODIUM 100 MG: 50 INJECTION INTRAMUSCULAR; INTRAVENOUS at 08:41

## 2017-01-01 RX ADMIN — GLIMEPIRIDE 4 MG: 2 TABLET ORAL at 18:04

## 2017-01-01 RX ADMIN — CARVEDILOL 25 MG: 25 TABLET, FILM COATED ORAL at 16:21

## 2017-01-01 RX ADMIN — METOLAZONE 2.5 MG: 2.5 TABLET ORAL at 10:49

## 2017-01-01 RX ADMIN — FENTANYL CITRATE 50 MCG: 50 INJECTION, SOLUTION INTRAMUSCULAR; INTRAVENOUS at 06:21

## 2017-01-01 RX ADMIN — FUROSEMIDE 20 MG: 10 INJECTION, SOLUTION INTRAMUSCULAR; INTRAVENOUS at 20:04

## 2017-01-01 RX ADMIN — DONEPEZIL HYDROCHLORIDE 5 MG: 5 TABLET, FILM COATED ORAL at 21:34

## 2017-01-01 RX ADMIN — HYDRALAZINE HYDROCHLORIDE 10 MG: 10 TABLET, FILM COATED ORAL at 05:24

## 2017-01-01 RX ADMIN — INSULIN LISPRO 2 UNITS: 100 INJECTION, SOLUTION INTRAVENOUS; SUBCUTANEOUS at 03:16

## 2017-01-01 RX ADMIN — SENNOSIDES 8.6 MG: 8.6 TABLET, FILM COATED ORAL at 08:11

## 2017-01-01 RX ADMIN — FUROSEMIDE 40 MG: 10 INJECTION, SOLUTION INTRAMUSCULAR; INTRAVENOUS at 00:08

## 2017-01-01 RX ADMIN — INSULIN LISPRO 4 UNITS: 100 INJECTION, SOLUTION INTRAVENOUS; SUBCUTANEOUS at 11:13

## 2017-01-01 RX ADMIN — PHENYTOIN SODIUM 100 MG: 50 INJECTION INTRAMUSCULAR; INTRAVENOUS at 09:25

## 2017-01-01 RX ADMIN — DONEPEZIL HYDROCHLORIDE 5 MG: 5 TABLET, FILM COATED ORAL at 00:17

## 2017-01-01 RX ADMIN — SODIUM CHLORIDE, SODIUM GLUCONATE, SODIUM ACETATE, POTASSIUM CHLORIDE, MAGNESIUM CHLORIDE, SODIUM PHOSPHATE, DIBASIC, AND POTASSIUM PHOSPHATE 100 ML/HR: .53; .5; .37; .037; .03; .012; .00082 INJECTION, SOLUTION INTRAVENOUS at 16:09

## 2017-01-01 RX ADMIN — ACETAMINOPHEN 650 MG: 325 TABLET, FILM COATED ORAL at 12:29

## 2017-01-01 RX ADMIN — INSULIN LISPRO 8 UNITS: 100 INJECTION, SOLUTION INTRAVENOUS; SUBCUTANEOUS at 08:48

## 2017-01-01 RX ADMIN — HEPARIN SODIUM 5000 UNITS: 5000 INJECTION, SOLUTION INTRAVENOUS; SUBCUTANEOUS at 21:08

## 2017-01-01 RX ADMIN — DONEPEZIL HYDROCHLORIDE 5 MG: 5 TABLET, FILM COATED ORAL at 22:32

## 2017-01-01 RX ADMIN — SODIUM CHLORIDE 75 ML/HR: 0.9 INJECTION, SOLUTION INTRAVENOUS at 16:02

## 2017-01-01 RX ADMIN — HYDRALAZINE HYDROCHLORIDE 10 MG: 10 TABLET, FILM COATED ORAL at 21:05

## 2017-01-01 RX ADMIN — SODIUM CHLORIDE 500 ML: 0.9 INJECTION, SOLUTION INTRAVENOUS at 19:30

## 2017-01-01 RX ADMIN — INSULIN LISPRO 4 UNITS: 100 INJECTION, SOLUTION INTRAVENOUS; SUBCUTANEOUS at 21:20

## 2017-01-01 RX ADMIN — METOPROLOL SUCCINATE 50 MG: 50 TABLET, FILM COATED, EXTENDED RELEASE ORAL at 09:01

## 2017-01-01 RX ADMIN — ACETAMINOPHEN 650 MG: 325 TABLET, FILM COATED ORAL at 23:00

## 2017-01-01 RX ADMIN — ALBUTEROL SULFATE 2.5 MG: 2.5 SOLUTION RESPIRATORY (INHALATION) at 15:23

## 2017-01-01 RX ADMIN — DOCUSATE SODIUM 100 MG: 100 CAPSULE, LIQUID FILLED ORAL at 16:35

## 2017-01-01 RX ADMIN — ACETAMINOPHEN 975 MG: 325 TABLET ORAL at 05:26

## 2017-01-01 RX ADMIN — POTASSIUM CHLORIDE 10 MEQ: 750 TABLET, EXTENDED RELEASE ORAL at 08:12

## 2017-01-01 RX ADMIN — INSULIN LISPRO 3 UNITS: 100 INJECTION, SOLUTION INTRAVENOUS; SUBCUTANEOUS at 21:53

## 2017-01-01 RX ADMIN — SENNOSIDES 8.6 MG: 8.6 TABLET, FILM COATED ORAL at 08:33

## 2017-01-01 RX ADMIN — SODIUM CHLORIDE 750 MG: 900 INJECTION, SOLUTION INTRAVENOUS at 08:44

## 2017-01-01 RX ADMIN — HEPARIN SODIUM 5000 UNITS: 5000 INJECTION, SOLUTION INTRAVENOUS; SUBCUTANEOUS at 05:13

## 2017-01-01 RX ADMIN — LORAZEPAM 1 MG: 1 TABLET ORAL at 09:46

## 2017-01-01 RX ADMIN — DONEPEZIL HYDROCHLORIDE 5 MG: 5 TABLET, FILM COATED ORAL at 22:07

## 2017-01-01 RX ADMIN — MAGNESIUM SULFATE HEPTAHYDRATE 2 G: 40 INJECTION, SOLUTION INTRAVENOUS at 08:26

## 2017-01-01 RX ADMIN — POTASSIUM CHLORIDE 10 MEQ: 750 TABLET, EXTENDED RELEASE ORAL at 08:05

## 2017-01-01 RX ADMIN — HYDRALAZINE HYDROCHLORIDE 25 MG: 25 TABLET, FILM COATED ORAL at 21:54

## 2017-01-01 RX ADMIN — LORAZEPAM 1 MG: 1 TABLET ORAL at 11:43

## 2017-01-01 RX ADMIN — METOPROLOL SUCCINATE 50 MG: 50 TABLET, FILM COATED, EXTENDED RELEASE ORAL at 09:19

## 2017-01-01 RX ADMIN — PIPERACILLIN SODIUM,TAZOBACTAM SODIUM 2.25 G: 2; .25 INJECTION, POWDER, FOR SOLUTION INTRAVENOUS at 05:33

## 2017-01-01 RX ADMIN — DONEPEZIL HYDROCHLORIDE 5 MG: 5 TABLET, FILM COATED ORAL at 21:00

## 2017-01-01 RX ADMIN — LEVETIRACETAM 1500 MG: 100 INJECTION, SOLUTION INTRAVENOUS at 20:52

## 2017-01-01 RX ADMIN — ACETAMINOPHEN 650 MG: 325 TABLET, FILM COATED ORAL at 18:04

## 2017-01-01 RX ADMIN — DOCUSATE SODIUM 100 MG: 100 CAPSULE, LIQUID FILLED ORAL at 08:12

## 2017-01-01 RX ADMIN — REGULAR STRENGTH 325 MG: 325 TABLET ORAL at 09:01

## 2017-01-01 RX ADMIN — INSULIN LISPRO 4 UNITS: 100 INJECTION, SOLUTION INTRAVENOUS; SUBCUTANEOUS at 11:45

## 2017-01-01 RX ADMIN — ACETAMINOPHEN 975 MG: 325 TABLET ORAL at 22:10

## 2017-01-01 RX ADMIN — HYDRALAZINE HYDROCHLORIDE 10 MG: 10 TABLET, FILM COATED ORAL at 21:00

## 2017-01-01 RX ADMIN — LEVETIRACETAM 1000 MG: 100 INJECTION, SOLUTION INTRAVENOUS at 11:31

## 2017-01-01 RX ADMIN — FENTANYL CITRATE 25 MCG: 50 INJECTION, SOLUTION INTRAMUSCULAR; INTRAVENOUS at 12:02

## 2017-01-01 RX ADMIN — HEPARIN SODIUM 5000 UNITS: 5000 INJECTION, SOLUTION INTRAVENOUS; SUBCUTANEOUS at 05:24

## 2017-01-01 RX ADMIN — FUROSEMIDE 20 MG: 10 INJECTION, SOLUTION INTRAMUSCULAR; INTRAVENOUS at 20:55

## 2017-01-01 RX ADMIN — SENNOSIDES 8.6 MG: 8.6 TABLET, FILM COATED ORAL at 10:52

## 2017-01-01 RX ADMIN — METOPROLOL SUCCINATE 50 MG: 50 TABLET, EXTENDED RELEASE ORAL at 10:51

## 2017-01-01 RX ADMIN — ASPIRIN 325 MG: 325 TABLET, DELAYED RELEASE ORAL at 08:33

## 2017-01-01 RX ADMIN — DOCUSATE SODIUM 100 MG: 100 CAPSULE, LIQUID FILLED ORAL at 18:04

## 2017-01-01 RX ADMIN — FENTANYL CITRATE 50 MCG: 50 INJECTION INTRAMUSCULAR; INTRAVENOUS at 21:28

## 2017-01-01 RX ADMIN — INSULIN LISPRO 2 UNITS: 100 INJECTION, SOLUTION INTRAVENOUS; SUBCUTANEOUS at 22:35

## 2017-01-01 RX ADMIN — FUROSEMIDE 40 MG: 10 INJECTION, SOLUTION INTRAMUSCULAR; INTRAVENOUS at 09:25

## 2017-01-01 RX ADMIN — HEPARIN SODIUM 5000 UNITS: 5000 INJECTION, SOLUTION INTRAVENOUS; SUBCUTANEOUS at 12:33

## 2017-01-01 RX ADMIN — HEPARIN SODIUM 5000 UNITS: 5000 INJECTION, SOLUTION INTRAVENOUS; SUBCUTANEOUS at 05:26

## 2017-01-01 RX ADMIN — METOPROLOL SUCCINATE 50 MG: 50 TABLET, EXTENDED RELEASE ORAL at 12:05

## 2017-01-01 RX ADMIN — DOCUSATE SODIUM 100 MG: 100 CAPSULE, LIQUID FILLED ORAL at 17:56

## 2017-01-01 RX ADMIN — HEPARIN SODIUM 5000 UNITS: 5000 INJECTION, SOLUTION INTRAVENOUS; SUBCUTANEOUS at 05:01

## 2017-01-01 RX ADMIN — FENTANYL CITRATE 50 MCG: 50 INJECTION, SOLUTION INTRAMUSCULAR; INTRAVENOUS at 08:33

## 2017-01-01 RX ADMIN — INSULIN LISPRO 8 UNITS: 100 INJECTION, SOLUTION INTRAVENOUS; SUBCUTANEOUS at 17:55

## 2017-01-01 RX ADMIN — ACETAMINOPHEN 975 MG: 325 TABLET ORAL at 22:19

## 2017-01-01 RX ADMIN — SODIUM CHLORIDE 750 MG: 900 INJECTION, SOLUTION INTRAVENOUS at 21:56

## 2017-01-01 RX ADMIN — REGULAR STRENGTH 325 MG: 325 TABLET ORAL at 15:48

## 2017-01-01 RX ADMIN — INSULIN LISPRO 3 UNITS: 100 INJECTION, SOLUTION INTRAVENOUS; SUBCUTANEOUS at 22:32

## 2017-01-01 RX ADMIN — FUROSEMIDE 40 MG: 40 TABLET ORAL at 08:06

## 2017-01-01 RX ADMIN — SODIUM CHLORIDE 750 MG: 900 INJECTION, SOLUTION INTRAVENOUS at 08:24

## 2017-01-01 RX ADMIN — TRAMADOL HYDROCHLORIDE 50 MG: 50 TABLET, FILM COATED ORAL at 11:43

## 2017-01-01 RX ADMIN — SODIUM CHLORIDE 125 ML/HR: 0.9 INJECTION, SOLUTION INTRAVENOUS at 08:30

## 2017-01-01 RX ADMIN — DONEPEZIL HYDROCHLORIDE 5 MG: 5 TABLET, FILM COATED ORAL at 22:18

## 2017-01-01 RX ADMIN — SENNOSIDES 8.6 MG: 8.6 TABLET, FILM COATED ORAL at 08:47

## 2017-01-01 RX ADMIN — ALBUTEROL SULFATE 2.5 MG: 2.5 SOLUTION RESPIRATORY (INHALATION) at 05:47

## 2017-01-01 RX ADMIN — ASPIRIN 325 MG: 325 TABLET, DELAYED RELEASE ORAL at 12:05

## 2017-01-01 RX ADMIN — LORAZEPAM 0.5 MG: 2 INJECTION INTRAMUSCULAR; INTRAVENOUS at 11:29

## 2017-01-01 RX ADMIN — HEPARIN SODIUM 2000 UNITS: 1000 INJECTION, SOLUTION INTRAVENOUS; SUBCUTANEOUS at 11:20

## 2017-01-01 RX ADMIN — INSULIN LISPRO 3 UNITS: 100 INJECTION, SOLUTION INTRAVENOUS; SUBCUTANEOUS at 17:43

## 2017-01-01 RX ADMIN — HEPARIN SODIUM 5000 UNITS: 5000 INJECTION, SOLUTION INTRAVENOUS; SUBCUTANEOUS at 21:00

## 2017-01-01 RX ADMIN — ACETAMINOPHEN 325 MG: 650 SUPPOSITORY RECTAL at 08:18

## 2017-01-01 RX ADMIN — GLIMEPIRIDE 4 MG: 2 TABLET ORAL at 10:09

## 2017-01-01 RX ADMIN — INSULIN GLARGINE 24 UNITS: 100 INJECTION, SOLUTION SUBCUTANEOUS at 22:19

## 2017-01-01 RX ADMIN — SODIUM CHLORIDE, SODIUM GLUCONATE, SODIUM ACETATE, POTASSIUM CHLORIDE, MAGNESIUM CHLORIDE, SODIUM PHOSPHATE, DIBASIC, AND POTASSIUM PHOSPHATE 100 ML/HR: .53; .5; .37; .037; .03; .012; .00082 INJECTION, SOLUTION INTRAVENOUS at 16:56

## 2017-01-01 RX ADMIN — DONEPEZIL HYDROCHLORIDE 5 MG: 5 TABLET, FILM COATED ORAL at 21:54

## 2017-01-01 RX ADMIN — GLIMEPIRIDE 4 MG: 2 TABLET ORAL at 10:51

## 2017-01-01 RX ADMIN — PHENYTOIN SODIUM 100 MG: 50 INJECTION INTRAMUSCULAR; INTRAVENOUS at 21:09

## 2017-01-01 RX ADMIN — SODIUM CHLORIDE 2000 ML: 0.9 INJECTION, SOLUTION INTRAVENOUS at 17:23

## 2017-01-01 RX ADMIN — SODIUM CHLORIDE, SODIUM GLUCONATE, SODIUM ACETATE, POTASSIUM CHLORIDE, MAGNESIUM CHLORIDE, SODIUM PHOSPHATE, DIBASIC, AND POTASSIUM PHOSPHATE 100 ML/HR: .53; .5; .37; .037; .03; .012; .00082 INJECTION, SOLUTION INTRAVENOUS at 04:30

## 2017-01-01 RX ADMIN — INSULIN LISPRO 2 UNITS: 100 INJECTION, SOLUTION INTRAVENOUS; SUBCUTANEOUS at 16:55

## 2017-01-01 RX ADMIN — POTASSIUM CHLORIDE 40 MEQ: 1500 TABLET, EXTENDED RELEASE ORAL at 10:09

## 2017-01-01 RX ADMIN — CARVEDILOL 25 MG: 25 TABLET, FILM COATED ORAL at 17:56

## 2017-01-01 RX ADMIN — MORPHINE SULFATE 10 MG: 100 SOLUTION ORAL at 05:36

## 2017-01-01 RX ADMIN — ACETAMINOPHEN 975 MG: 325 TABLET ORAL at 12:37

## 2017-01-01 RX ADMIN — HEPARIN SODIUM 5000 UNITS: 5000 INJECTION, SOLUTION INTRAVENOUS; SUBCUTANEOUS at 22:34

## 2017-01-01 RX ADMIN — REGULAR STRENGTH 325 MG: 325 TABLET ORAL at 09:19

## 2017-01-01 RX ADMIN — HEPARIN SODIUM 5000 UNITS: 5000 INJECTION, SOLUTION INTRAVENOUS; SUBCUTANEOUS at 05:10

## 2017-01-01 RX ADMIN — INSULIN LISPRO 1 UNITS: 100 INJECTION, SOLUTION INTRAVENOUS; SUBCUTANEOUS at 08:12

## 2017-01-01 RX ADMIN — MORPHINE SULFATE 10 MG: 100 SOLUTION ORAL at 09:46

## 2017-01-01 RX ADMIN — DONEPEZIL HYDROCHLORIDE 5 MG: 5 TABLET, FILM COATED ORAL at 22:34

## 2017-01-01 RX ADMIN — FUROSEMIDE 40 MG: 10 INJECTION, SOLUTION INTRAMUSCULAR; INTRAVENOUS at 17:56

## 2017-01-01 RX ADMIN — DOCUSATE SODIUM 100 MG: 100 CAPSULE, LIQUID FILLED ORAL at 16:21

## 2017-01-01 RX ADMIN — PIPERACILLIN SODIUM,TAZOBACTAM SODIUM 2.25 G: 2; .25 INJECTION, POWDER, FOR SOLUTION INTRAVENOUS at 11:10

## 2017-01-01 RX ADMIN — HEPARIN SODIUM 5000 UNITS: 5000 INJECTION, SOLUTION INTRAVENOUS; SUBCUTANEOUS at 14:51

## 2017-01-01 RX ADMIN — LEVETIRACETAM 500 MG: 100 INJECTION, SOLUTION INTRAVENOUS at 09:30

## 2017-01-01 RX ADMIN — INSULIN LISPRO 1 UNITS: 100 INJECTION, SOLUTION INTRAVENOUS; SUBCUTANEOUS at 08:30

## 2017-01-01 RX ADMIN — PIPERACILLIN SODIUM,TAZOBACTAM SODIUM 2.25 G: 2; .25 INJECTION, POWDER, FOR SOLUTION INTRAVENOUS at 11:45

## 2017-01-01 RX ADMIN — CEPHALEXIN 250 MG: 250 CAPSULE ORAL at 22:18

## 2017-01-01 RX ADMIN — FENTANYL CITRATE 50 MCG: 50 INJECTION INTRAMUSCULAR; INTRAVENOUS at 00:18

## 2017-01-01 RX ADMIN — POTASSIUM CHLORIDE 20 MEQ: 200 INJECTION, SOLUTION INTRAVENOUS at 08:42

## 2017-01-01 RX ADMIN — FENTANYL CITRATE 50 MCG: 50 INJECTION INTRAMUSCULAR; INTRAVENOUS at 19:43

## 2017-01-01 RX ADMIN — CEFEPIME HYDROCHLORIDE 1000 MG: 1 INJECTION, POWDER, FOR SOLUTION INTRAMUSCULAR; INTRAVENOUS at 00:39

## 2017-01-01 RX ADMIN — METOPROLOL SUCCINATE 50 MG: 50 TABLET, FILM COATED, EXTENDED RELEASE ORAL at 08:12

## 2017-01-01 RX ADMIN — ASPIRIN 325 MG: 325 TABLET, DELAYED RELEASE ORAL at 08:12

## 2017-01-01 RX ADMIN — VANCOMYCIN HYDROCHLORIDE 1250 MG: 1 INJECTION, POWDER, LYOPHILIZED, FOR SOLUTION INTRAVENOUS at 12:15

## 2017-01-01 RX ADMIN — HEPARIN SODIUM 5000 UNITS: 5000 INJECTION, SOLUTION INTRAVENOUS; SUBCUTANEOUS at 14:18

## 2017-01-01 RX ADMIN — DOCUSATE SODIUM 100 MG: 100 CAPSULE, LIQUID FILLED ORAL at 09:25

## 2017-01-01 RX ADMIN — PHENYTOIN SODIUM 100 MG: 50 INJECTION INTRAMUSCULAR; INTRAVENOUS at 01:37

## 2017-01-01 RX ADMIN — DOCUSATE SODIUM 100 MG: 100 CAPSULE, LIQUID FILLED ORAL at 10:52

## 2017-01-01 RX ADMIN — PHENYTOIN SODIUM 100 MG: 50 INJECTION INTRAMUSCULAR; INTRAVENOUS at 21:56

## 2017-01-01 RX ADMIN — HEPARIN SODIUM 5000 UNITS: 5000 INJECTION, SOLUTION INTRAVENOUS; SUBCUTANEOUS at 22:19

## 2017-01-01 RX ADMIN — INSULIN LISPRO 3 UNITS: 100 INJECTION, SOLUTION INTRAVENOUS; SUBCUTANEOUS at 11:39

## 2017-01-01 RX ADMIN — SODIUM CHLORIDE, SODIUM GLUCONATE, SODIUM ACETATE, POTASSIUM CHLORIDE, MAGNESIUM CHLORIDE, SODIUM PHOSPHATE, DIBASIC, AND POTASSIUM PHOSPHATE 100 ML/HR: .53; .5; .37; .037; .03; .012; .00082 INJECTION, SOLUTION INTRAVENOUS at 04:14

## 2017-01-01 RX ADMIN — FUROSEMIDE 40 MG: 40 TABLET ORAL at 08:32

## 2017-01-01 RX ADMIN — CARVEDILOL 25 MG: 25 TABLET, FILM COATED ORAL at 17:17

## 2017-01-01 RX ADMIN — MORPHINE SULFATE 10 MG: 100 SOLUTION ORAL at 12:42

## 2017-01-01 RX ADMIN — METOPROLOL SUCCINATE 50 MG: 50 TABLET, EXTENDED RELEASE ORAL at 08:47

## 2017-01-01 RX ADMIN — SODIUM CHLORIDE 50 ML/HR: 0.9 INJECTION, SOLUTION INTRAVENOUS at 22:20

## 2017-01-01 RX ADMIN — SODIUM CHLORIDE, SODIUM GLUCONATE, SODIUM ACETATE, POTASSIUM CHLORIDE, MAGNESIUM CHLORIDE, SODIUM PHOSPHATE, DIBASIC, AND POTASSIUM PHOSPHATE 1000 ML: .53; .5; .37; .037; .03; .012; .00082 INJECTION, SOLUTION INTRAVENOUS at 07:34

## 2017-01-01 RX ADMIN — DOCUSATE SODIUM 100 MG: 100 CAPSULE, LIQUID FILLED ORAL at 17:17

## 2017-01-01 RX ADMIN — HYDRALAZINE HYDROCHLORIDE 10 MG: 10 TABLET, FILM COATED ORAL at 12:17

## 2017-01-01 RX ADMIN — PIPERACILLIN SODIUM,TAZOBACTAM SODIUM 2.25 G: 2; .25 INJECTION, POWDER, FOR SOLUTION INTRAVENOUS at 11:51

## 2017-01-01 RX ADMIN — TRAMADOL HYDROCHLORIDE 50 MG: 50 TABLET, FILM COATED ORAL at 00:40

## 2017-01-01 RX ADMIN — INSULIN LISPRO 1 UNITS: 100 INJECTION, SOLUTION INTRAVENOUS; SUBCUTANEOUS at 08:06

## 2017-01-01 RX ADMIN — INSULIN LISPRO 4 UNITS: 100 INJECTION, SOLUTION INTRAVENOUS; SUBCUTANEOUS at 22:08

## 2017-01-01 RX ADMIN — HEPARIN SODIUM 5000 UNITS: 5000 INJECTION, SOLUTION INTRAVENOUS; SUBCUTANEOUS at 05:37

## 2017-01-01 RX ADMIN — HYDRALAZINE HYDROCHLORIDE 25 MG: 25 TABLET, FILM COATED ORAL at 05:10

## 2017-01-01 RX ADMIN — DONEPEZIL HYDROCHLORIDE 5 MG: 5 TABLET, FILM COATED ORAL at 22:59

## 2017-01-01 RX ADMIN — INSULIN LISPRO 3 UNITS: 100 INJECTION, SOLUTION INTRAVENOUS; SUBCUTANEOUS at 21:00

## 2017-01-01 RX ADMIN — INSULIN LISPRO 2 UNITS: 100 INJECTION, SOLUTION INTRAVENOUS; SUBCUTANEOUS at 16:48

## 2017-01-01 RX ADMIN — FUROSEMIDE 20 MG: 10 INJECTION, SOLUTION INTRAMUSCULAR; INTRAVENOUS at 19:43

## 2017-01-01 RX ADMIN — MORPHINE SULFATE 10 MG: 100 SOLUTION ORAL at 11:27

## 2017-01-01 RX ADMIN — CARVEDILOL 25 MG: 25 TABLET, FILM COATED ORAL at 08:11

## 2017-01-01 RX ADMIN — HEPARIN SODIUM 5000 UNITS: 5000 INJECTION, SOLUTION INTRAVENOUS; SUBCUTANEOUS at 21:03

## 2017-01-01 RX ADMIN — FENTANYL CITRATE 25 MCG: 50 INJECTION, SOLUTION INTRAMUSCULAR; INTRAVENOUS at 03:21

## 2017-01-01 RX ADMIN — INSULIN LISPRO 2 UNITS: 100 INJECTION, SOLUTION INTRAVENOUS; SUBCUTANEOUS at 21:35

## 2017-01-01 RX ADMIN — FUROSEMIDE 40 MG: 40 TABLET ORAL at 08:12

## 2017-01-01 RX ADMIN — HEPARIN SODIUM 5000 UNITS: 5000 INJECTION, SOLUTION INTRAVENOUS; SUBCUTANEOUS at 12:55

## 2017-01-01 RX ADMIN — ALBUTEROL SULFATE 2.5 MG: 2.5 SOLUTION RESPIRATORY (INHALATION) at 09:22

## 2017-01-01 RX ADMIN — HEPARIN SODIUM 5000 UNITS: 5000 INJECTION, SOLUTION INTRAVENOUS; SUBCUTANEOUS at 21:53

## 2017-01-01 RX ADMIN — INSULIN LISPRO 4 UNITS: 100 INJECTION, SOLUTION INTRAVENOUS; SUBCUTANEOUS at 05:52

## 2017-01-01 RX ADMIN — SODIUM CHLORIDE 750 MG: 900 INJECTION, SOLUTION INTRAVENOUS at 20:48

## 2017-01-01 RX ADMIN — PIPERACILLIN SODIUM,TAZOBACTAM SODIUM 2.25 G: 2; .25 INJECTION, POWDER, FOR SOLUTION INTRAVENOUS at 17:06

## 2017-01-01 RX ADMIN — HEPARIN SODIUM 5000 UNITS: 5000 INJECTION, SOLUTION INTRAVENOUS; SUBCUTANEOUS at 14:17

## 2017-01-01 RX ADMIN — DOCUSATE SODIUM 100 MG: 100 CAPSULE, LIQUID FILLED ORAL at 10:09

## 2017-01-01 RX ADMIN — INSULIN LISPRO 3 UNITS: 100 INJECTION, SOLUTION INTRAVENOUS; SUBCUTANEOUS at 12:34

## 2017-01-01 RX ADMIN — MORPHINE SULFATE 10 MG: 100 SOLUTION ORAL at 14:22

## 2017-01-01 RX ADMIN — CHLORHEXIDINE GLUCONATE 15 ML: 1.2 RINSE ORAL at 09:28

## 2017-01-01 RX ADMIN — CEFTRIAXONE SODIUM 1000 MG: 10 INJECTION, POWDER, FOR SOLUTION INTRAVENOUS at 20:04

## 2017-01-01 RX ADMIN — POTASSIUM CHLORIDE 10 MEQ: 20 SOLUTION ORAL at 00:21

## 2017-01-01 RX ADMIN — ALBUTEROL SULFATE 2.5 MG: 2.5 SOLUTION RESPIRATORY (INHALATION) at 20:36

## 2017-01-01 RX ADMIN — MORPHINE SULFATE 10 MG: 100 SOLUTION ORAL at 23:36

## 2017-01-01 RX ADMIN — ACETAMINOPHEN 975 MG: 325 TABLET ORAL at 05:13

## 2017-01-01 RX ADMIN — HYDRALAZINE HYDROCHLORIDE 10 MG: 10 TABLET, FILM COATED ORAL at 05:37

## 2017-01-01 RX ADMIN — ACETAMINOPHEN 975 MG: 325 TABLET ORAL at 21:34

## 2017-01-01 RX ADMIN — ASPIRIN 325 MG: 325 TABLET, DELAYED RELEASE ORAL at 08:06

## 2017-01-01 RX ADMIN — INSULIN LISPRO 2 UNITS: 100 INJECTION, SOLUTION INTRAVENOUS; SUBCUTANEOUS at 05:44

## 2017-01-01 RX ADMIN — ACETAMINOPHEN 325 MG: 650 SUPPOSITORY RECTAL at 17:09

## 2017-01-01 RX ADMIN — FUROSEMIDE 40 MG: 40 TABLET ORAL at 10:09

## 2017-01-01 RX ADMIN — INSULIN GLARGINE 20 UNITS: 100 INJECTION, SOLUTION SUBCUTANEOUS at 21:34

## 2017-01-01 RX ADMIN — HYDRALAZINE HYDROCHLORIDE 10 MG: 10 TABLET, FILM COATED ORAL at 14:54

## 2017-01-01 RX ADMIN — SODIUM CHLORIDE 75 ML/HR: 0.9 INJECTION, SOLUTION INTRAVENOUS at 08:15

## 2017-01-01 RX ADMIN — INSULIN LISPRO 1 UNITS: 100 INJECTION, SOLUTION INTRAVENOUS; SUBCUTANEOUS at 17:17

## 2017-01-01 RX ADMIN — INSULIN LISPRO 8 UNITS: 100 INJECTION, SOLUTION INTRAVENOUS; SUBCUTANEOUS at 11:12

## 2017-01-01 RX ADMIN — HEPARIN SODIUM 5000 UNITS: 5000 INJECTION, SOLUTION INTRAVENOUS; SUBCUTANEOUS at 14:24

## 2017-01-01 RX ADMIN — POTASSIUM CHLORIDE 20 MEQ: 200 INJECTION, SOLUTION INTRAVENOUS at 07:35

## 2017-01-01 RX ADMIN — CARVEDILOL 25 MG: 25 TABLET, FILM COATED ORAL at 08:32

## 2017-01-01 RX ADMIN — INSULIN LISPRO 1 UNITS: 100 INJECTION, SOLUTION INTRAVENOUS; SUBCUTANEOUS at 22:29

## 2017-01-01 RX ADMIN — ASPIRIN 325 MG: 325 TABLET ORAL at 19:11

## 2017-01-01 RX ADMIN — ACETAMINOPHEN 650 MG: 325 TABLET, FILM COATED ORAL at 00:15

## 2017-01-01 RX ADMIN — HEPARIN SODIUM 5000 UNITS: 5000 INJECTION, SOLUTION INTRAVENOUS; SUBCUTANEOUS at 05:18

## 2017-01-01 RX ADMIN — SODIUM CHLORIDE 750 MG: 900 INJECTION, SOLUTION INTRAVENOUS at 08:33

## 2017-01-01 RX ADMIN — DOCUSATE SODIUM 100 MG: 100 CAPSULE, LIQUID FILLED ORAL at 08:32

## 2017-01-01 RX ADMIN — METOROPROLOL TARTRATE 5 MG: 5 INJECTION, SOLUTION INTRAVENOUS at 01:44

## 2017-01-01 RX ADMIN — HEPARIN SODIUM 5000 UNITS: 5000 INJECTION, SOLUTION INTRAVENOUS; SUBCUTANEOUS at 12:17

## 2017-01-01 RX ADMIN — CARVEDILOL 25 MG: 25 TABLET, FILM COATED ORAL at 08:05

## 2017-01-01 RX ADMIN — SENNOSIDES 8.6 MG: 8.6 TABLET, FILM COATED ORAL at 08:59

## 2017-01-01 RX ADMIN — MORPHINE SULFATE 10 MG: 100 SOLUTION ORAL at 09:01

## 2017-01-01 RX ADMIN — CEPHALEXIN 250 MG: 250 CAPSULE ORAL at 05:12

## 2017-01-01 RX ADMIN — DOCUSATE SODIUM 100 MG: 100 CAPSULE, LIQUID FILLED ORAL at 08:58

## 2017-01-01 RX ADMIN — ALBUTEROL SULFATE 2.5 MG: 2.5 SOLUTION RESPIRATORY (INHALATION) at 05:52

## 2017-01-01 RX ADMIN — METOPROLOL SUCCINATE 50 MG: 50 TABLET, FILM COATED, EXTENDED RELEASE ORAL at 08:30

## 2017-01-01 RX ADMIN — ASPIRIN 325 MG: 325 TABLET, DELAYED RELEASE ORAL at 11:40

## 2017-01-01 RX ADMIN — INSULIN LISPRO 2 UNITS: 100 INJECTION, SOLUTION INTRAVENOUS; SUBCUTANEOUS at 16:38

## 2017-01-01 RX ADMIN — FENTANYL CITRATE 25 MCG: 50 INJECTION, SOLUTION INTRAMUSCULAR; INTRAVENOUS at 21:25

## 2017-01-01 RX ADMIN — CARVEDILOL 25 MG: 25 TABLET, FILM COATED ORAL at 16:35

## 2017-01-01 RX ADMIN — VANCOMYCIN HYDROCHLORIDE 1250 MG: 1 INJECTION, POWDER, LYOPHILIZED, FOR SOLUTION INTRAVENOUS at 11:10

## 2017-01-01 RX ADMIN — FENTANYL CITRATE 50 MCG: 50 INJECTION INTRAMUSCULAR; INTRAVENOUS at 01:38

## 2017-01-01 RX ADMIN — HEPARIN SODIUM AND DEXTROSE 12 UNITS/KG/HR: 10000; 5 INJECTION INTRAVENOUS at 20:27

## 2017-01-01 RX ADMIN — ACETAMINOPHEN 325 MG: 325 TABLET ORAL at 14:17

## 2017-01-01 RX ADMIN — CEPHALEXIN 250 MG: 250 CAPSULE ORAL at 13:59

## 2017-01-01 RX ADMIN — SENNOSIDES 8.6 MG: 8.6 TABLET, FILM COATED ORAL at 09:25

## 2017-01-01 RX ADMIN — MORPHINE SULFATE 10 MG: 100 SOLUTION ORAL at 10:07

## 2017-01-01 RX ADMIN — PIPERACILLIN SODIUM,TAZOBACTAM SODIUM 2.25 G: 2; .25 INJECTION, POWDER, FOR SOLUTION INTRAVENOUS at 17:34

## 2017-01-01 RX ADMIN — POTASSIUM CHLORIDE 10 MEQ: 750 TABLET, EXTENDED RELEASE ORAL at 09:25

## 2017-01-01 RX ADMIN — INSULIN LISPRO 2 UNITS: 100 INJECTION, SOLUTION INTRAVENOUS; SUBCUTANEOUS at 12:15

## 2017-01-01 RX ADMIN — HEPARIN SODIUM 4000 UNITS: 1000 INJECTION, SOLUTION INTRAVENOUS; SUBCUTANEOUS at 20:27

## 2017-01-01 RX ADMIN — INSULIN LISPRO 2 UNITS: 100 INJECTION, SOLUTION INTRAVENOUS; SUBCUTANEOUS at 16:23

## 2017-01-01 RX ADMIN — ACETAMINOPHEN 650 MG: 325 TABLET, FILM COATED ORAL at 05:11

## 2017-01-01 RX ADMIN — ACETAMINOPHEN 325 MG: 650 SUPPOSITORY RECTAL at 05:43

## 2017-01-01 RX ADMIN — METOLAZONE 2.5 MG: 2.5 TABLET ORAL at 10:09

## 2017-01-01 RX ADMIN — SCOPALAMINE 1 PATCH: 1 PATCH, EXTENDED RELEASE TRANSDERMAL at 23:37

## 2017-01-01 RX ADMIN — REGULAR STRENGTH 325 MG: 325 TABLET ORAL at 08:12

## 2017-01-01 RX ADMIN — INSULIN LISPRO 4 UNITS: 100 INJECTION, SOLUTION INTRAVENOUS; SUBCUTANEOUS at 12:17

## 2017-01-01 RX ADMIN — ACETAMINOPHEN 975 MG: 325 TABLET ORAL at 13:59

## 2017-01-01 RX ADMIN — INSULIN LISPRO 4 UNITS: 100 INJECTION, SOLUTION INTRAVENOUS; SUBCUTANEOUS at 11:22

## 2017-01-01 RX ADMIN — HEPARIN SODIUM 5000 UNITS: 5000 INJECTION, SOLUTION INTRAVENOUS; SUBCUTANEOUS at 21:05

## 2017-01-01 RX ADMIN — HEPARIN SODIUM 5000 UNITS: 5000 INJECTION, SOLUTION INTRAVENOUS; SUBCUTANEOUS at 06:26

## 2017-01-01 RX ADMIN — SODIUM CHLORIDE 1350 MG PE: 9 INJECTION, SOLUTION INTRAVENOUS at 14:46

## 2017-01-01 RX ADMIN — FUROSEMIDE 40 MG: 40 TABLET ORAL at 16:35

## 2017-01-01 RX ADMIN — CEFTRIAXONE SODIUM 1000 MG: 10 INJECTION, POWDER, FOR SOLUTION INTRAVENOUS at 19:38

## 2017-01-01 RX ADMIN — FUROSEMIDE 40 MG: 10 INJECTION, SOLUTION INTRAMUSCULAR; INTRAVENOUS at 08:58

## 2017-01-01 RX ADMIN — CEFTRIAXONE SODIUM 1000 MG: 10 INJECTION, POWDER, FOR SOLUTION INTRAVENOUS at 19:05

## 2017-01-01 RX ADMIN — POTASSIUM CHLORIDE 20 MEQ: 200 INJECTION, SOLUTION INTRAVENOUS at 09:25

## 2017-01-01 RX ADMIN — POTASSIUM CHLORIDE 10 MEQ: 750 TABLET, EXTENDED RELEASE ORAL at 08:59

## 2017-01-01 RX ADMIN — FENTANYL CITRATE 50 MCG: 50 INJECTION INTRAMUSCULAR; INTRAVENOUS at 12:08

## 2017-01-01 RX ADMIN — HEPARIN SODIUM 5000 UNITS: 5000 INJECTION, SOLUTION INTRAVENOUS; SUBCUTANEOUS at 21:34

## 2017-01-01 RX ADMIN — FUROSEMIDE 40 MG: 40 TABLET ORAL at 10:49

## 2017-01-01 RX ADMIN — SODIUM CHLORIDE 75 ML/HR: 0.9 INJECTION, SOLUTION INTRAVENOUS at 02:56

## 2017-01-01 RX ADMIN — HYDRALAZINE HYDROCHLORIDE 10 MG: 10 TABLET, FILM COATED ORAL at 12:55

## 2017-01-01 RX ADMIN — INSULIN GLARGINE 15 UNITS: 100 INJECTION, SOLUTION SUBCUTANEOUS at 22:08

## 2017-01-01 RX ADMIN — CARVEDILOL 25 MG: 25 TABLET, FILM COATED ORAL at 08:59

## 2017-01-01 RX ADMIN — SODIUM CHLORIDE, SODIUM GLUCONATE, SODIUM ACETATE, POTASSIUM CHLORIDE, MAGNESIUM CHLORIDE, SODIUM PHOSPHATE, DIBASIC, AND POTASSIUM PHOSPHATE 100 ML/HR: .53; .5; .37; .037; .03; .012; .00082 INJECTION, SOLUTION INTRAVENOUS at 00:02

## 2017-01-01 RX ADMIN — INSULIN LISPRO 3 UNITS: 100 INJECTION, SOLUTION INTRAVENOUS; SUBCUTANEOUS at 07:24

## 2017-01-01 RX ADMIN — NITROGLYCERIN 10 MCG/MIN: 20 INJECTION INTRAVENOUS at 08:10

## 2017-01-01 RX ADMIN — DONEPEZIL HYDROCHLORIDE 5 MG: 5 TABLET, FILM COATED ORAL at 21:05

## 2017-01-01 RX ADMIN — MORPHINE SULFATE 10 MG: 100 SOLUTION ORAL at 00:15

## 2017-01-01 RX ADMIN — CHLORHEXIDINE GLUCONATE 15 ML: 1.2 RINSE ORAL at 00:02

## 2017-05-02 PROBLEM — N18.30 ACUTE RENAL FAILURE SUPERIMPOSED ON STAGE 3 CHRONIC KIDNEY DISEASE (HCC): Status: ACTIVE | Noted: 2017-01-01

## 2017-05-02 PROBLEM — R77.8 ELEVATED TROPONIN: Chronic | Status: ACTIVE | Noted: 2017-01-01
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
## 2017-05-02 PROBLEM — F03.90 DEMENTIA (HCC): Chronic | Status: ACTIVE | Noted: 2017-01-01

## 2017-05-02 PROBLEM — N17.9 ACUTE RENAL FAILURE SUPERIMPOSED ON STAGE 3 CHRONIC KIDNEY DISEASE (HCC): Status: ACTIVE | Noted: 2017-01-01

## 2017-05-02 PROBLEM — M62.82 RHABDOMYOLYSIS: Status: ACTIVE | Noted: 2017-01-01

## 2017-05-02 PROBLEM — T79.6XXA TRAUMATIC RHABDOMYOLYSIS (HCC): Status: ACTIVE | Noted: 2017-01-01

## 2017-05-02 PROBLEM — N39.0 UTI (URINARY TRACT INFECTION): Status: ACTIVE | Noted: 2017-01-01

## 2017-05-02 PROBLEM — I10 ESSENTIAL HYPERTENSION: Status: ACTIVE | Noted: 2017-01-01

## 2017-05-02 PROBLEM — E87.2 LACTIC ACIDOSIS: Status: ACTIVE | Noted: 2017-01-01

## 2017-05-02 PROBLEM — E11.9 DIABETES MELLITUS TYPE 2, CONTROLLED (HCC): Chronic | Status: ACTIVE | Noted: 2017-01-01
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
## 2017-05-08 PROBLEM — R06.00 DYSPNEA: Status: ACTIVE | Noted: 2017-01-01

## 2017-05-08 PROBLEM — N18.30 CKD (CHRONIC KIDNEY DISEASE) STAGE 3, GFR 30-59 ML/MIN (HCC): Status: ACTIVE | Noted: 2017-01-01

## 2017-05-08 PROBLEM — R65.10 SIRS (SYSTEMIC INFLAMMATORY RESPONSE SYNDROME) (HCC): Status: ACTIVE | Noted: 2017-01-01

## 2017-05-08 PROBLEM — I50.9 ACUTE CHF (CONGESTIVE HEART FAILURE) (HCC): Status: ACTIVE | Noted: 2017-01-01

## 2017-05-08 PROBLEM — I16.1 HYPERTENSIVE EMERGENCY: Status: ACTIVE | Noted: 2017-01-01

## 2017-05-08 PROBLEM — J81.1 PULMONARY EDEMA: Status: ACTIVE | Noted: 2017-01-01

## 2017-05-08 PROBLEM — I10 ACCELERATED HYPERTENSION: Status: ACTIVE | Noted: 2017-01-01

## 2017-11-12 PROBLEM — S06.5X9A SUBDURAL HEMATOMA (HCC): Status: ACTIVE | Noted: 2017-01-01

## 2017-11-12 PROBLEM — N18.9 ACUTE-ON-CHRONIC KIDNEY INJURY (HCC): Status: ACTIVE | Noted: 2017-01-01

## 2017-11-12 PROBLEM — Z86.79 HISTORY OF CHF (CONGESTIVE HEART FAILURE): Status: ACTIVE | Noted: 2017-01-01

## 2017-11-12 PROBLEM — F03.90 DEMENTIA (HCC): Status: ACTIVE | Noted: 2017-01-01

## 2017-11-12 PROBLEM — N17.9 ACUTE-ON-CHRONIC KIDNEY INJURY (HCC): Status: ACTIVE | Noted: 2017-01-01

## 2017-11-12 PROBLEM — R29.6 MULTIPLE FALLS: Status: ACTIVE | Noted: 2017-01-01

## 2017-11-13 NOTE — OCCUPATIONAL THERAPY NOTE
[address] [facility] Evaluation     Patient Name: Shahbaz Grey  ILECX'Y Date: 11/13/2017  Problem List  Patient Active Problem List   Diagnosis    Essential hypertension    Elevated troponin    Traumatic rhabdomyolysis ([facility] [address] )    UTI (urinary tract infection)    Fall at home    Diabetes mellitus type 2, controlled ([facility] [address] )    Acute renal failure superimposed on stage 3 chronic kidney disease (HCC)    Lactic acidosis    Dementia    Dyspnea    Pulmonary edema    Acute CHF (congestive heart failure) (HCC)    CKD (chronic kidney disease) stage 3, GFR 30-59 ml/min    SIRS (systemic inflammatory response syndrome) (HCC)    Accelerated hypertension    Subdural hematoma (HCC)    Multiple falls    History of CHF (congestive heart failure)    Acute-on-chronic kidney injury ([facility] [address] )    Dementia     Past Medical History  Past Medical History:   Diagnosis Date    CHF (congestive heart failure) (HCC)     Chronic pain     Dementia     Diabetes mellitus ([facility] [address] )     Hypertension      Past Surgical History  Past Surgical History:   Procedure Laterality Date    HYSTERECTOMY           11/13/17 1040   Note Type   Note type Eval/Treat   Restrictions/Precautions   Weight Bearing Precautions Per Order No   Other Precautions Cognitive; Chair Alarm; Bed Alarm;O2;Fall Risk;Pain;Multiple lines   Pain Assessment   Pain Assessment [facility] Pain Intervention(s) Repositioned; Ambulation/increased activity; Emotional support   Response to Interventions tolerated   Pain Rating: FLACC (Rest) - Face 0   Pain Rating: FLACC (Rest) - Legs 0   Pain Rating: FLACC (Rest) - Activity 0   Pain Rating: FLACC (Rest) - Cry 0   Pain Rating: FLACC (Rest) - Consolability 0   Score: FLACC (Rest) 0   Pain Rating: FLACC (Activity) - Face 1   Pain Rating: FLACC (Activity) - Legs 1   Pain Rating: FLACC (Activity) - Activity 1   Pain Rating: FLACC (Activity) - Cry 1   Pain Rating: FLACC (Activity) - Consolability 1   Score: FLACC (Activity) 5   Home Living   Type of [facility] One level;Ramped entrance  (2 ANNABEL)   Bathroom Shower/Tub Tub/shower unit   Bathroom Equipment Commode;Tub transfer bench   [facility]  (Chair lift )   Additional Comments Pt's son present for evaluation; pt's son reports pt has had increased difficulty w/ ambulation within the home s/p fall in May  Pt no longer drives and receives assistance 3x/day (morning, lunch, and early afternoon) for ADLS/IADLS via HHA and attends [email or web address] [facility] 3x/week  Pt using BSC over toilet and only sponge bathing  At baseline pt ambulates w/ RW  Prior Function   Level of [place] Needs assistance with IADLs; Needs assistance with ADLs and functional mobility   Lives With Alone   Receives Help From Family   ADL Assistance Needs assistance   IADLs Needs assistance   Falls in the last 6 months 1 to 4  (at least 2; 1 recent)   Vocational Retired   Lifestyle   Autonomy Pt's son present for evaluation according to pt's son; Pt was received assistance for ADLS/IADLS via HHA 3x/day and completed functional mobility/transfers independently w/ use of RW  Pt's son reports staying over every other night to provide assistance  Reciprocal Relationships Pt has supportive son whom provides assistance when off from work  Pt currently living alone w/ assistance from [facility]  Service to Others Pt is retired   Intrinsic Gratification Pt's son reports pt "likes to sleep a lot"   Psychosocial   Psychosocial (WDL) WDL   Subjective   Subjective "I don't know why I am here   Why am I here?"   ADL   Where Assessed Chair   Eating Assistance 4  Minimal Assistance   Grooming Assistance 3  Moderate Assistance   UB Bathing Assistance 2  Maximal Assistance   LB Bathing Assistance 2  Maximal Assistance   UB Dressing Assistance 2  Maximal Assistance   LB Dressing Assistance 2  Maximal [address]  2  Maximal Assistance   Functional Assistance 2  Maximal Assistance   Bed Mobility   Supine to Sit 2  Maximal assistance   Additional items Assist x 1;HOB elevated; Increased time required;LE management;Verbal cues   Transfers   Sit to Stand 2  Maximal assistance   Additional items Assist x 1; Increased time required;Verbal cues   Stand to Sit 2  Maximal assistance   Additional items Assist x 1; Increased time required;Verbal cues   Stand pivot 2  Maximal assistance   Additional items Assist x 1; Increased time required;Verbal cues  (w/ use of RW)   Additional Comments Pt completed transfers w/ use of RW; recommend assist of 2nd for safety  Pt required max cues t/o for direction following, sequencing, and safety  Pt became incontinent of urine during sit to stand transfer requiring max A x1 for steadying w/ max A for perianal hygiene  Balance   Static Sitting Fair   Dynamic Sitting Fair -   Static Standing Poor   Dynamic Standing Poor -   Activity Tolerance   Activity Tolerance Patient limited by fatigue;Patient limited by pain   Medical Staff Made Aware Will contact CM regarding d/c recommendation   Nurse Made Aware Appropriate to see per RN    RUE Assessment   RUE Assessment WFL   RUE Strength   RUE Overall Strength Deficits   LUE Assessment   LUE Assessment WFL   LUE Strength   LUE Overall Strength Deficits   Hand Function   Gross Motor Coordination Functional   Fine Motor Coordination Functional   Cognition   Overall Cognitive Status Impaired   Arousal/Participation Responsive   Attention Difficulty attending to directions   Orientation Level Oriented to person;Disoriented to place; Disoriented to time;Disoriented to situation   Memory Decreased recall of precautions;Decreased recall of recent events;Decreased short term memory;Decreased long term memory;Decreased recall of biographical information   Following Commands Follows one step commands with increased time or repetition   Comments Pt w/ baseline dementia   Pt currently oriented to self only however unable to report age  Recommend chair/bed alarm on pt at all times  Pt unable to identify call bell at end of session; would benefit from continued reinforcement  Assessment   Limitation Decreased ADL status; Decreased Safe judgement during ADL;Decreased cognition;Decreased endurance;Decreased self-care trans;Decreased high-level ADLs   Prognosis Fair;Guarded   Assessment Pt is a 79 Y/O female who presented to hospitals s/p fall at home w/ +head strike and ?LOC  Pt dx w/ acute midline subdural hematoma  Pt PMH includes ambulatory dysfunction, dementia, CHF, DM, chronic pressure ulcers of B/L LE, and IVAN w/ CKD  Pt's son present for evaluation providing all information regarding baseline function 2' pt being poor historian  According to pt's son; pt was independent in  [facility] prior to previous fall in May  S/p fall, pt requires HHA and family support for ADLS, including dressing and sponge bathing, and assistance w/ IADLS  Pt son reports pt was independent in tileting PTA  Pt was modified independent in functional mobility within the home w/ use of RW PTA  Pt receives assistance via HHA 3x/day (morning, lunch, and afternoon) to receive assistance w/ IADLS and attends [employer] 3x/week  Pt using BSC over toilet and tub transfer bench at baseline  Currently pt is oriented to person only; including name and , excluding age  Pt requires overall max A for ADLS, bed mobility, sit<->stand transfers, and stand pivot transfers w/ use of RW  Pt requires max cues t/o for direction following, sequencing, and safety  Pt became incontinent of urine upon sit ->stand transfer requiring max A x1 for steadying and max A of second for perianal hygiene   Pt currently experiencing the following limitations: pain, decreased activity tolerance, fatigue, overall weakness, deconditioning, disorientation, baseline dementia, decreased insight/judgment/safety awareness, impaired memory, difficulty attending to task, slow to process/respond, unable to follow multi-step commands, decreased sitting tolerance, decreased standing tolerance, impaired balance, increased risk for falls, decreased ADL status, and decreased functional mobility/transfers  Currently recommend pt d/c to short term rehab w/ possible long term placement when medically cleared  Will continue to follow to address the below goals  Goals   Patient Goals none stated   LTG Time Frame 10-14   Long Term Goal #1 see below   Plan   Treatment Interventions ADL retraining;Visual perceptual retraining;Functional transfer training; Endurance training;Cognitive reorientation;Patient/family training;Equipment evaluation/education; Compensatory technique education;Continued evaluation; Energy conservation; Activityengagement   Goal Expiration Date 11/27/17   OT Frequency 3-5x/wk   Recommendation   OT Discharge Recommendation Short Term Rehab  (w/ possible long term placement )   OT - OK to Discharge Yes  (to short term rehab when medically cleared)   Barthel Index   Feeding 5   Bathing 0   Grooming Score 0   Dressing Score 0   Bladder Score 5   Bowels Score 10   Toilet Use Score 0   Transfers (Bed/Chair) Score 5   Mobility (Level Surface) Score 0   Stairs Score 0   Barthel Index Score 25   Modified [place] Scale   Modified [place] Scale 4         Occupational therapy goals to be met within 10-14 Days    Pt will be attentive 100% of the time during continued cognitive assessment for assistance in safe d/c planning (ACLs)    Pt will complete bed mobility w/ min A  for increased participation in functional tasks     Pt will increase EOB sitting tolerance to ~10 minutes w/ supervision for increased activity tolerance and participation in functional tasks    Pt will complete light grooming task/self feeding while sitting EOB w/ good balance and safety for increased independence in self care tasks     Pt will increase standing tolerance to ~2 minutes for increased activity tolerance and participation in functional tasks    Pt will increase functional transfers/mobility, on off all surfaces, including toilet, with min A and use of DME as needed    Pt will complete toileting, including clothing management and perianal hygiene, with min A and good balance/safety     Pt will increase UB ADLS to min A  with use of compensatory techniques while seated with good balance/safety    Pt will increase LB ADLS to mod A with use of compensatory techniques while seated with good balance/safety      Pt will increase activity tolerance to ~15-20 minutes to increase functional participation in therapy sessions    Pt will follow simple 2-step commands 100% of the  time w/ visual/verbal/tactile cues to increase participation in functional/leisure activities     Pt will demonstrate good carry over of energy conservation/safety/compensatory techniques when participating in tasks w/ use of visual/verbal/tactile cues     Documentation completed by Gordon Spotted Apgar, OTS  Occupational Therapy Student

## 2017-11-13 NOTE — PLAN OF CARE
Problem: PHYSICAL THERAPY ADULT  Goal: Performs mobility at highest level of function for planned discharge setting  See evaluation for individualized goals  Treatment/Interventions: Functional transfer training, LE strengthening/ROM, Therapeutic exercise, Endurance training, Cognitive reorientation, Patient/family training, Equipment eval/education, Bed mobility, Gait training, Spoke to nursing, Spoke to case management, OT  Equipment Recommended: Wheelchair, [place] Boot       See flowsheet documentation for full assessment, interventions and recommendations  Prognosis: Fair  Problem List: Decreased strength, Decreased range of motion, Decreased endurance, Impaired balance, Decreased mobility, Decreased coordination, Decreased cognition, Decreased safety awareness, Decreased skin integrity, Pain  Assessment: Pt is a 80year old female presenting s/p fall at home  PT consulted to assess functional mobility and assist with safe d c planning  PT eval performed with verbal activity orders per Carlos Enrique-trauma PA-C  Pt with an extensive problem list which includes HTN, elevated troponin, traumatic rhabdomyolysis, UTI, falls, ARF, dementia, pulmonary edema, CHF, CKD, SIRS  OFr a more comprehensive list, please see above  PTA, pt living home alone  She has a supportive son, HHA 3x/day and goes to [employer] to 3x/wk  Pt ambulates with a RW and son admits to a h/o falls  Pt's son admits to impaired cognition with concern for patient's safety at home  On eval, pt presenting with the following deficits; impaired balance, poor tolerance to activity, decreased strength and ROM and decreased overall functional mobility  Pt in supine upon arrival and agreeable to participate in PT session  Pt oriented to self and place but not time or situation  Pt required max Ax1 for bed mobility, transfers and ambulation of short distances with RW vs mod Ax2  PT to continue to follow pt during stay to progress functional mobility (I and safety  Rehab vs long term SNF placement at d/c    Barriers to Discharge: Decreased caregiver support  Barriers to Discharge Comments: lives alone   Recommendation: Short-term skilled PT, Long-term skilled nursing home placement     PT - OK to Discharge: Yes (to SNF when medically appropriate )    See flowsheet documentation for full assessment

## 2017-11-13 NOTE — PLAN OF CARE
DISCHARGE PLANNING     Discharge to home or other facility with appropriate resources Progressing        Knowledge Deficit     Patient/family/caregiver demonstrates understanding of disease process, treatment plan, medications, and discharge instructions Progressing        PAIN - ADULT     Verbalizes/displays adequate comfort level or baseline comfort level Progressing        Potential for Falls     Patient will remain free of falls Progressing        Prexisting or High Potential for Compromised Skin Integrity     Skin integrity is maintained or improved Progressing        SAFETY ADULT     Maintain or return to baseline ADL function Progressing     Maintain or return mobility status to optimal level Progressing        SKIN/TISSUE INTEGRITY - ADULT     Skin integrity remains intact Progressing     Incision(s), wounds(s) or drain site(s) healing without S/S of infection Progressing

## 2017-11-13 NOTE — ED NOTES
Pt arrived with ace wraps to b/l LE's from foot to mid-thigh  Large amount of edema present  Dressings with old drainage noted  Malodorous  B/L ace wraps removed  Pre-existing wounds with telfa dressings noted to left posterior knee, left foot  Right ankle/foot       Blondie Loc RN  11/12/17 [id number]

## 2017-11-13 NOTE — CONSULTS
Progress Note - Wound   Velasquez [place] 80 y o  female MRN: [id number]  Unit/Bed#: [facility] 902-01 Encounter: [id number]      Assessment:   Patient is a 80year old female admitted from home after multiple fall with subdural hematoma and present on admission wounds  Bilateral LE with multiple wounds of multiples etiology, posterior knees with unstageable wounds measuring as documented below with scattered slough, eschar and pink granulation secondary to pressure from ace wrap which patient rolls down to below the knee  Bilateral foot with pressure injury secondary to ace wrap; L side with stage 2 wound with 100% pink base, R side unstageable wound with 100% yellow slough at wound bed measuring as documented below  Bilateral buttocks with present on admission stage 2 pressure injuries all with 100% pink wound bed and red blanching periwound  Bilateral LE with scattered partial thickness wounds secondary to chronic venous stasis dermatitis, all with 100% pink base, currently not weeping with significant decrease in edema as per son's observation  Left second toe with stable diabetic ulcer with black dried eschar and venous stasis as well with dried yellow eschar  All wound measurement are place in flow sheet, wound care completed to LE after assessment, son in room and agreeable with treatment plan  Plan:   1-Wash b/l LE daily with hibiclens soap and water  2-Skin repair cream to b/l LE unopen areas daily  3-Silvasorb gel to all wounds on b/l LE, DSD, ABD and manuel  4-Betadine to L 2nd toe wounds  5-Hydraguard to L heel  6-Turn/reposition q2h for pressure re-distribution on skin  7-Elevate heels to offload pressure  8-Prevalon boots to b/l LE  9-Soft care cushion when out of bed  10-Calazime paste to sacrum, buttocks TID and PRN  *Please consider podiatry consult for LE wounds*      Vitals: Blood pressure 158/59, pulse 71, temperature (!) 97 °F (36 1 °C), temperature source Axillary, resp   rate 16, height 5' 2" (1 575 m), weight 69 4 kg (153 lb 1 6 oz), SpO2 97 %  ,Body mass index is 28 kg/m²  Pressure Ulcer 05/08/17 Heel Right R heel plantar stage 2 pressure injury  Wound # 7 (Active)   Staging Stage II 11/13/2017 10:00 AM   Wound Description Pink 11/13/2017 10:00 AM   Darcy-wound Assessment Erythema 11/13/2017 10:00 AM   Wound Length (cm) 1 cm 11/13/2017 10:00 AM   Wound Width (cm) 1 cm 11/13/2017 10:00 AM   Wound Depth (cm) 0 1 11/13/2017 10:00 AM   Calculated Wound Area (cm^2) 1 cm^2 11/13/2017 10:00 AM   Calculated Wound Volume (cm^3) 0 1 cm^3 11/13/2017 10:00 AM   Drainage Amount None 11/13/2017  1:52 AM   Drainage Description Foul smelling;Yellow; Tan 11/12/2017  9:33 PM   Treatment Elevated with pillow(s); Offload 11/13/2017  1:52 AM   Dressing Silvasorb gel 11/13/2017 10:00 AM   Dressing Changed Changed 11/13/2017 10:00 AM   Patient Tolerance Tolerated well 11/13/2017 10:00 AM   Dressing Status Clean;Dry; Intact 11/13/2017  1:52 AM       Pressure Ulcer 05/12/17 Heel Right R heel stage 2 pressure injury  Wound # 8 (Active)   Staging Stage II 11/13/2017 10:00 AM   Wound Description Pink 11/13/2017 10:00 AM   Darcy-wound Assessment Erythema 11/13/2017 10:00 AM   Wound Length (cm) 0 5 cm 11/13/2017 10:00 AM   Wound Width (cm) 0 5 cm 11/13/2017 10:00 AM   Wound Depth (cm) 0 1 11/13/2017 10:00 AM   Calculated Wound Area (cm^2) 0 25 cm^2 11/13/2017 10:00 AM   Calculated Wound Volume (cm^3) 0 02 cm^3 11/13/2017 10:00 AM   Drainage Amount None 11/13/2017  1:52 AM   Treatment Turn & reposition; Offload 11/13/2017  7:00 AM   Dressing Silvasorb gel 11/13/2017 10:00 AM   Dressing Changed Changed 11/13/2017 10:00 AM   Patient Tolerance Tolerated well 11/13/2017 10:00 AM       Pressure Ulcer 11/13/17 Knee Posterior;Right; Outer R posterolateral knee unstageable pressure injury        Wound # 1 (Active)   Staging Unstagable 11/13/2017 10:00 AM   Wound Description Beefy red;Black 11/13/2017 10:00 AM   Darcy-wound Assessment Erythema 11/13/2017 10:00 AM   Wound Length (cm) 1 98 cm 11/13/2017 10:00 AM   Wound Width (cm) 3 5 cm 11/13/2017 10:00 AM   Wound Depth (cm) 0 5 11/13/2017 10:00 AM   Calculated Wound Area (cm^2) 6 93 cm^2 11/13/2017 10:00 AM   Calculated Wound Volume (cm^3) 3 46 cm^3 11/13/2017 10:00 AM   Drainage Amount None 11/13/2017  1:52 AM   Treatment Turn & reposition; Offload 11/13/2017  7:00 AM   Dressing Silvasorb gel 11/13/2017 10:00 AM   Dressing Changed Changed 11/13/2017 10:00 AM   Patient Tolerance Tolerated well 11/13/2017 10:00 AM       Pressure Ulcer 11/13/17 Buttocks Right R buttocks stage 2 pressure injury  Wound # 2 (Active)   Staging Stage II 11/13/2017 10:00 AM   Wound Description Pink 11/13/2017 10:00 AM   Darcy-wound Assessment Erythema 11/13/2017 10:00 AM   Wound Length (cm) 0 4 cm 11/13/2017 10:00 AM   Wound Width (cm) 0 7 cm 11/13/2017 10:00 AM   Wound Depth (cm) 0 1 11/13/2017 10:00 AM   Calculated Wound Area (cm^2) 0 28 cm^2 11/13/2017 10:00 AM   Calculated Wound Volume (cm^3) 0 03 cm^3 11/13/2017 10:00 AM   Drainage Amount None 11/13/2017  1:52 AM   Treatment Offload; Turn & reposition 11/13/2017  7:00 AM   Dressing Protective barrier 11/13/2017 10:00 AM   Patient Tolerance Tolerated well 11/13/2017 10:00 AM       Pressure Ulcer 11/13/17 Buttocks Left;Proximal L proximal buttock stage 2 pressure injury  Wound # 3 (Active)   Staging Stage II 11/13/2017 10:00 AM   Wound Description Pink 11/13/2017 10:00 AM   Darcy-wound Assessment Erythema 11/13/2017 10:00 AM   Wound Length (cm) 0 7 cm 11/13/2017 10:00 AM   Wound Width (cm) 0 5 cm 11/13/2017 10:00 AM   Wound Depth (cm) 0 1 11/13/2017 10:00 AM   Calculated Wound Area (cm^2) 0 35 cm^2 11/13/2017 10:00 AM   Calculated Wound Volume (cm^3) 0 04 cm^3 11/13/2017 10:00 AM   Drainage Amount None 11/13/2017  1:52 AM   Treatment Offload; Turn & reposition 11/13/2017  7:00 AM   Dressing Silvasorb gel 11/13/2017 10:00 AM   Dressing Changed Changed 11/13/2017 10:00 AM   Patient Tolerance Tolerated well 11/13/2017 10:00 AM       Pressure Ulcer 11/13/17 Knee Left;Posterior; Outer L postero-lateral knee unstageable pressure injury  wound # 4 (Active)   Staging Unstagable 11/13/2017 10:00 AM   Wound Description Pink;Yellow 11/13/2017 10:00 AM   Darcy-wound Assessment Erythema 11/13/2017 10:00 AM   Wound Length (cm) 1 8 cm 11/13/2017 10:00 AM   Wound Width (cm) 2 cm 11/13/2017 10:00 AM   Wound Depth (cm) 0 5 11/13/2017 10:00 AM   Calculated Wound Area (cm^2) 3 6 cm^2 11/13/2017 10:00 AM   Calculated Wound Volume (cm^3) 1 8 cm^3 11/13/2017 10:00 AM   Treatment Offload; Turn & reposition 11/13/2017  7:00 AM   Dressing Silvasorb gel 11/13/2017 10:00 AM   Dressing Changed Changed 11/13/2017 10:00 AM   Patient Tolerance Tolerated well 11/13/2017 10:00 AM       Pressure Ulcer 11/13/17 Knee Left;Posterior; Inner L postero-medial knee unstageable pressure injury  Wound # 5 (Active)   Staging Unstagable 11/13/2017 10:00 AM   Wound Description Black;Pink;Slough; Yellow 11/13/2017 10:00 AM   Darcy-wound Assessment Erythema 11/13/2017 10:00 AM   Wound Length (cm) 2 4 cm 11/13/2017 10:00 AM   Wound Width (cm) 6 cm 11/13/2017 10:00 AM   Wound Depth (cm) 0 5 11/13/2017 10:00 AM   Calculated Wound Area (cm^2) 14 4 cm^2 11/13/2017 10:00 AM   Calculated Wound Volume (cm^3) 7 2 cm^3 11/13/2017 10:00 AM   Treatment Offload; Turn & reposition 11/13/2017  7:00 AM   Dressing Silvasorb gel 11/13/2017 10:00 AM   Dressing Changed Changed 11/13/2017 10:00 AM   Patient Tolerance Tolerated well 11/13/2017 10:00 AM       Pressure Ulcer 11/13/17 Buttocks Left;Distal L buttock distal stage 2 pressure injury       Wound # 6 (Active)   Staging Stage II 11/13/2017 10:00 AM   Wound Description Pink 11/13/2017 10:00 AM   Darcy-wound Assessment Erythema 11/13/2017 10:00 AM   Wound Length (cm) 1 8 cm 11/13/2017 10:00 AM   Wound Width (cm) 0 7 cm 11/13/2017 10:00 AM   Wound Depth (cm) 0 1 11/13/2017 10:00 AM Calculated Wound Area (cm^2) 1 26 cm^2 11/13/2017 10:00 AM   Calculated Wound Volume (cm^3) 0 13 cm^3 11/13/2017 10:00 AM   Drainage Amount None 11/13/2017  1:52 AM   Treatment Turn & reposition; Offload 11/13/2017  7:00 AM   Dressing Protective barrier 11/13/2017 10:00 AM   Patient Tolerance Tolerated well 11/13/2017 10:00 AM       Pressure Ulcer 11/13/17 Foot Left L foot stage 2 pressure injury  Wound # 17 (Active)   Staging Stage II 11/13/2017 10:00 AM   Wound Description Pink 11/13/2017 10:00 AM   Darcy-wound Assessment Intact 11/13/2017 10:00 AM   Wound Length (cm) 0 5 cm 11/13/2017 10:00 AM   Wound Width (cm) 2 cm 11/13/2017 10:00 AM   Wound Depth (cm) 0 1 11/13/2017 10:00 AM   Calculated Wound Area (cm^2) 1 cm^2 11/13/2017 10:00 AM   Calculated Wound Volume (cm^3) 0 1 cm^3 11/13/2017 10:00 AM   Dressing Silvasorb gel 11/13/2017 10:00 AM   Dressing Changed Changed 11/13/2017 10:00 AM   Patient Tolerance Tolerated well 11/13/2017 10:00 AM       Pressure Ulcer 11/13/17 Foot Right R foot unstageable pressure injury  Wound # 9 (Active)   Staging Unstagable 11/13/2017 10:00 AM   Wound Description Slough; Yellow 11/13/2017 10:00 AM   Darcy-wound Assessment Erythema 11/13/2017 10:00 AM   Wound Length (cm) 1 cm 11/13/2017 10:00 AM   Wound Width (cm) 1 4 cm 11/13/2017 10:00 AM   Calculated Wound Area (cm^2) 1 4 cm^2 11/13/2017 10:00 AM   Dressing Silvasorb gel 11/13/2017 10:00 AM   Dressing Changed Changed 11/13/2017 10:00 AM   Patient Tolerance Tolerated well 11/13/2017 10:00 AM       Pressure Ulcer 11/13/17 Knee Right;Proximal R postero-medial unstageable pressure injury  woud  # 10 (Active)   Staging Unstagable 11/13/2017 10:00 AM   Wound Description Black; Pink 11/13/2017 10:00 AM   Darcy-wound Assessment Erythema 11/13/2017 10:00 AM   Wound Length (cm) 2 cm 11/13/2017 10:00 AM   Wound Width (cm) 5 cm 11/13/2017 10:00 AM   Wound Depth (cm) 0 5 11/13/2017 10:00 AM   Calculated Wound Area (cm^2) 10 cm^2 11/13/2017 10:00 AM   Calculated Wound Volume (cm^3) 5 cm^3 11/13/2017 10:00 AM   Dressing Silvasorb gel 11/13/2017 10:00 AM   Dressing Changed Changed 11/13/2017 10:00 AM   Patient Tolerance Tolerated well 11/13/2017 10:00 AM       Other Ulcers 11/13/17 Leg Right R calf venous stasis partial thickness wounds  Wound # 11 (Active)   Wound Description Beefy red 11/13/2017 10:00 AM   Darcy-wound Assessment Erythema 11/13/2017 10:00 AM   Wound Length (cm) 2 cm 11/13/2017 10:00 AM   Wound Width (cm) 4 5 cm 11/13/2017 10:00 AM   Wound Depth (cm) 0 1 11/13/2017 10:00 AM   Calculated Wound Volume (cm^3) 0 9 cm^3 11/13/2017 10:00 AM   Dressings Other (Comment) 11/13/2017  7:00 AM   Dressing Status Clean;Dry; Intact 11/13/2017  7:00 AM       Other Ulcers 11/13/17 Leg Right R shin venous stasis partial thickness wound  Wound # 12 (Active)   Wound Description Beefy red 11/13/2017 10:00 AM   Darcy-wound Assessment Erythema 11/13/2017 10:00 AM   Wound Length (cm) 1 3 cm 11/13/2017 10:00 AM   Wound Width (cm) 2 5 cm 11/13/2017 10:00 AM   Wound Depth (cm) 0 1 11/13/2017 10:00 AM   Calculated Wound Volume (cm^3) 0 32 cm^3 11/13/2017 10:00 AM   Dressings Other (Comment) 11/13/2017  7:00 AM   Patient Tolerance Tolerated well 11/13/2017 10:00 AM   Dressing Status Clean;Dry; Intact 11/13/2017  7:00 AM       Other Ulcers 11/13/17 Leg Left L shin venous stasis partial thickness wound  Wound # 13 (Active)   Wound Description Pink 11/13/2017 10:00 AM   Darcy-wound Assessment Erythema 11/13/2017 10:00 AM   Wound Length (cm) 1 9 cm 11/13/2017 10:00 AM   Wound Width (cm) 5 5 cm 11/13/2017 10:00 AM   Wound Depth (cm) 0 1 11/13/2017 10:00 AM   Calculated Wound Volume (cm^3) 1 04 cm^3 11/13/2017 10:00 AM   Dressings Other (Comment) 11/13/2017  7:00 AM   Patient Tolerance Tolerated well 11/13/2017 10:00 AM   Dressing Status Clean;Dry; Intact 11/13/2017  7:00 AM       Other Ulcers 11/13/17 Leg Right L calf venous stasis partial thickness wound  Wound # 14 (Active)   Wound Description Pink 11/13/2017 10:00 AM   Darcy-wound Assessment Erythema 11/13/2017 10:00 AM   Shape round 11/13/2017  7:00 AM   Wound Length (cm) 1 7 cm 11/13/2017 10:00 AM   Wound Width (cm) 1 5 cm 11/13/2017 10:00 AM   Wound Depth (cm) 0 1 11/13/2017 10:00 AM   Calculated Wound Volume (cm^3) 0 26 cm^3 11/13/2017 10:00 AM   Dressings Other (Comment) 11/13/2017  7:00 AM   Patient Tolerance Tolerated well 11/13/2017 10:00 AM   Dressing Status Clean;Dry; Intact 11/13/2017  7:00 AM       Other Ulcers 11/13/17 Other (Comment) Left L second toe plantar aspect diabetic ulcer  Wound # 15 (Active)   Wound Description Dry;Black 11/13/2017 10:00 AM   Darcy-wound Assessment Intact 11/13/2017 10:00 AM   Wound Length (cm) 1 3 cm 11/13/2017 10:00 AM   Wound Width (cm) 1 3 cm 11/13/2017 10:00 AM   Treatment Elevated with pillow(s) 11/13/2017  7:00 AM   Patient Tolerance Tolerated well 11/13/2017 10:00 AM       Other Ulcers 11/13/17 Other (Comment) Left L second toe dorsal aspect venous stasis PTW  Wound # 16 (Active)   Wound Description Dry;Slough; Yellow 11/13/2017 10:00 AM   Darcy-wound Assessment Erythema 11/13/2017 10:00 AM   Wound Length (cm) 3 cm 11/13/2017 10:00 AM   Wound Width (cm) 1 cm 11/13/2017 10:00 AM   Wound Depth (cm) 0 1 11/13/2017 10:00 AM   Calculated Wound Volume (cm^3) 0 3 cm^3 11/13/2017 10:00 AM   Treatment Elevated with pillow(s) 11/13/2017  7:00 AM   Patient Tolerance Tolerated well 11/13/2017 10:00 AM         Wound care completed, orders placed  Please call wound care to ext [phone] with questions or concerns, we will continue following      Nayana Romero, RN, BSN, [facility]

## 2017-11-13 NOTE — CONSULTS
Consultation - Neurosurgery   Peter Babcock 80 y o  female MRN: [id number]  Unit/Bed#: [facility] 902-01 Encounter: [id number]      Inpatient consult to Neurosurgery  Consult performed by: Adrianna Garibay ordered by: Niurka Owens          Assessment/Plan     Assessment:  1  tSDH anterior falx  2  Bilateral extra axial collections - tSDH vs hygroma  3  Recurrent falls  4  IVAN on CKD  5  Dementia  6  DM  7  CHF      Plan:  81 y/o demented female with multiple falls found to have a falx SDH  · Exam reveals GCS 14  O x self and location  FC  REYES  No drift  Continue to monitor  · Images reviewed personally and by attending  Final results as below  · CT head wo 11/13/17: slight improvement in falx SDH  Grossly stable extraaxial collections R>L  · CT head wo 11/12/17: acute anterior falx SDH and enlarged right greater than left extra axial collections, hygroma vs SDH, which appears more apparent then when compared to 5/2/2017  · No neurosurgical intervention anticipated at this juncture  · Recommend patient avoid all antiplatelet and anticoagulation medications  · Mobilize as able  · Fall precautions  · DVT PPX: SCDs  May start HSQ given stable CT head  · Continue home medications and ongoing medical management per primary team    · Will sign-off  Patient may follow-up on an as needed basis  Call with questions/concerns  History of Present Illness     HPI: Peter Babcock is a 80 y o  female with PMH including HTN, DM, dementia, CHF who presents with falx SDH  Patient is unable to provide history which was obtained from the chart  She sustained a fall at home  Patient has assistance 3 times per day for ADLs  She has had multiple falls since May and noted to have a physical and mental decline since that time  her most recent fall was a few days ago but she refused to go to the ER at that time  She was found on the ground on the day of admission by her son  Per report, baseline confusion   Patient denies headache, neck of back pain  Denies vision or speech changes  States she does not "get around "  She has a walker and is incontinent  Review of Systems   Unable to perform ROS: Dementia   Musculoskeletal: Negative for back pain and neck pain  Neurological: Negative for headaches  Psychiatric/Behavioral: Positive for confusion  Historical Information   Past Medical History:   Diagnosis Date    [facility]F [facility]congestive heart failure) ([facility] )     Chronic pain     Dementia     Diabetes mellitus ([facility] )     Hypertension      Past Surgical History:   Procedure Laterality Date    HYSTERECTOMY       History   Alcohol Use    Yes     Comment: occassionaly wine drinker     History   Drug Use No     History   Smoking Status    Former Smoker    Quit date: 1947   Smokeless Tobacco    Never Used     Comment: patient no longer a smoker     Family History   Problem Relation Age of Onset    Family history unknown: Yes       Meds/Allergies   all current active meds have been reviewed, current meds:   Current Facility-Administered Medications   Medication Dose Route Frequency    acetaminophen (TYLENOL) tablet 650 mg  650 mg Oral Q6H [address]    calcium carbonate (TUMS) chewable tablet 1,000 mg  1,000 mg Oral Daily PRN    docusate sodium (COLACE) capsule 100 mg  100 mg Oral BID    donepezil (ARICEPT) tablet 5 mg  5 mg Oral HS    furosemide (LASIX) tablet 40 mg  40 mg Oral Daily    glimepiride (AMARYL) tablet 4 mg  4 mg Oral BID    metolazone (ZAROXOLYN) tablet 2 5 mg  2 5 mg Oral Daily    metoprolol succinate (TOPROL-XL) 24 hr tablet 50 mg  50 mg Oral Daily    ondansetron (ZOFRAN) injection 4 mg  4 mg Intravenous Q6H PRN    senna (SENOKOT) tablet 8 6 mg  1 tablet Oral Daily    and PTA meds:   Prior to Admission Medications   Prescriptions Last Dose Informant Patient Reported?  Taking?   acetaminophen (TYLENOL) 325 mg tablet   Yes Yes   Sig: Take 325 mg by mouth every 6 (six) hours as needed for mild pain donepezil (ARICEPT) 5 mg tablet   Yes Yes   Sig: Take 5 mg by mouth daily at bedtime   fentaNYL (DURAGESIC) 25 mcg/hr   Yes Yes   Sig: Place 1 patch on the skin every third day   furosemide (LASIX) 40 mg tablet   No Yes   Sig: Take 1 tablet by mouth daily for 30 days   glimepiride (AMARYL) 4 mg tablet   Yes Yes   Sig: Take 4 mg by mouth 2 (two) times a day     metolazone (ZAROXOLYN) 2 5 mg tablet   Yes Yes   Sig: Take 2 5 mg by mouth daily   metoprolol succinate (TOPROL-XL) 50 mg 24 hr tablet   Yes Yes   Sig: Take 50 mg by mouth daily   potassium chloride (MICRO-K) 10 MEQ CR capsule   Yes Yes   Sig: Take 10 mEq by mouth daily      Facility-Administered Medications: None     No Known Allergies    Objective   I/O       11/11 0701 - 11/12 0700 11/12 0701 - 11/13 0700 11/13 0701 - 11/14 0700    P  O    180    Total Intake(mL/kg)   180 (2 6)    Urine (mL/kg/hr)  600     Total Output   600      Net   -600 +180           Unmeasured Urine Occurrence   2 x    Unmeasured Stool Occurrence   0 x          Physical Exam   Constitutional: She appears well-developed  No distress  HENT:   Head: Normocephalic and atraumatic  Eyes: EOM are normal  No scleral icterus  irregular left pupil NR   Neck: Normal range of motion  Neck supple  Cardiovascular: Normal rate  Pulmonary/Chest: Effort normal  No respiratory distress  Abdominal: Soft  She exhibits no distension  There is no tenderness  Musculoskeletal: She exhibits no tenderness  Neurological: She has a normal Finger-Nose-Finger Test    Skin: Skin is warm and dry  She is not diaphoretic  Psychiatric: Her affect is blunt  Her speech is delayed  Cognition and memory are impaired  Neurologic Exam     Mental Status   Oriented to person  Oriented to place  Disoriented to time  Follows 2 step commands  Attention: decreased  Concentration: decreased  Speech: (Delayed)  Level of consciousness: alert  Knowledge: poor  Abnormal comprehension       Cranial Nerves     CN III, IV, VI   Extraocular motions are normal    Nystagmus: none     CN V   Facial sensation intact  CN VII   Facial expression full, symmetric  CN VIII   Hearing: impaired    CN XI   Right trapezius strength: normal  Left trapezius strength: normal    CN XII   Tongue: not atrophic  Fasciculations: absent  Tongue deviation: none    Motor Exam   Muscle bulk: decreased  Overall muscle tone: decreased  Right arm pronator drift: present  Left arm pronator drift: presentMAE with age related weakness - non focal     Sensory Exam   Light touch normal      Gait, Coordination, and Reflexes     Coordination   Finger to nose coordination: normal    Tremor   Resting tremor: absent  Intention tremor: absent  Action tremor: absent    Reflexes   Right Millan: absent  Left Millan: absent  Right ankle clonus: absent  Left ankle clonus: absent      Vitals:Blood pressure 158/59, pulse 71, temperature (!) 97 °F (36 1 °C), temperature source Axillary, resp  rate 16, height 5' 2" (1 575 m), weight 69 4 kg (153 lb 1 6 oz), SpO2 97 %  ,Body mass index is 28 kg/m²  Lab Results:     Results from last 7 days  Lab Units 11/13/17 0028 11/12/17 1952   WBC Thousand/uL 15 14* 13 82*   HEMOGLOBIN g/dL 9 8* 9 3*   HEMATOCRIT % 29 7* 28 9*   PLATELETS Thousands/uL 445* 460*   NEUTROS PCT %  --  83*   MONOS PCT %  --  6       Results from last 7 days  Lab Units 11/13/17 0829 11/13/17 0028 11/12/17 1952   SODIUM mmol/L 137 135* 132*   POTASSIUM mmol/L 3 2* 3 1* 3 6   CHLORIDE mmol/L 100 98* 94*   CO2 mmol/L 28 30 28   BUN mg/dL 35* 35* 38*   CREATININE mg/dL 1 41* 1 47* 1 75*   CALCIUM mg/dL 8 3 8 6 8 8   GLUCOSE RANDOM mg/dL 161* 204* 231*               Results from last 7 days  Lab Units 11/12/17 1952   INR  1 00   PTT seconds 26     No results found for: TROPONINT  ABG:No results found for: PHART, CAX9UWP, PO2ART, DOV7ETB, O8DVMSPN, BEART, SOURCE    Imaging Studies: I have personally reviewed pertinent reports     and I have personally reviewed pertinent films in PACS     CT head wo 11/12/17: Acute midline subdural hemorrhage along the anterior falx measuring 15 mm x 5 mm  EKG, Pathology, and Other Studies: I have personally reviewed pertinent reports  VTE Prophylaxis: Sequential compression device (Venodyne)  and Reason for no pharmacologic prophylaxis SDH    Code Status: Level 3 - DNAR and DNI  Advance Directive and Living Will: Yes    Power of : Yes  POLST:      Counseling / Coordination of Care  I spent 45 minutes with the patient

## 2017-11-13 NOTE — PLAN OF CARE
DISCHARGE PLANNING     Discharge to home or other facility with appropriate resources Progressing        Knowledge Deficit     Patient/family/caregiver demonstrates understanding of disease process, treatment plan, medications, and discharge instructions Progressing        PAIN - ADULT     Verbalizes/displays adequate comfort level or baseline comfort level Progressing        Potential for Falls     Patient will remain free of falls Progressing        SAFETY ADULT     Maintain or return to baseline ADL function Progressing     Maintain or return mobility status to optimal level Progressing        SKIN/TISSUE INTEGRITY - ADULT     Skin integrity remains intact Progressing     Incision(s), wounds(s) or drain site(s) healing without S/S of infection Progressing

## 2017-11-13 NOTE — EMTALA/ACUTE CARE TRANSFER
[address] [facility],3Rd Floor [facility]  Dept: [phone]      EMTALA TRANSFER CONSENT    NAME Julia Jones                                         1920                              MRN [id number]    I have been informed of my rights regarding examination, treatment, and transfer   by Dr Dennis Faria MD    Benefits: Specialized equipment and/or services available at the receiving facility (Include comment)________________________ (trauma and neurosurgery)    Risks:        Consent for Transfer:  I acknowledge that my medical condition has been evaluated and explained to me by the emergency department physician or other qualified medical person and/or my attending physician, who has recommended that I be transferred to the service of  Accepting Physician: Dr Ryan Curiel at [address] [place] [address] Name, [address] : [address] [facility]  The above potential benefits of such transfer, the potential risks associated with such transfer, and the probable risks of not being transferred have been explained to me, and I fully understand them  The doctor has explained that, in my case, the benefits of transfer outweigh the risks  I agree to be transferred  I authorize the performance of emergency medical procedures and treatments upon me in both transit and upon arrival at the receiving facility  Additionally, I authorize the release of any and all medical records to the receiving facility and request they be transported with me, if possible  I understand that the safest mode of transportation during a medical emergency is an ambulance and that the Hospital advocates the use of this mode of transport  Risks of traveling to the receiving facility by car, including absence of medical control, life sustaining equipment, such as oxygen, and medical personnel has been explained to me and I fully understand them      ([address])  [ ]  I consent to the stated transfer and to be transported by ambulance/helicopter  [  ]  I consent to the stated transfer, but refuse transportation by ambulance and accept full responsibility for my transportation by car  I understand the risks of non-ambulance transfers and I exonerate the Hospital and its staff from any deterioration in my condition that results from this refusal     X___________________________________________    DATE  17  TIME________  Signature of patient or legally responsible individual signing on patient behalf           RELATIONSHIP TO PATIENT_________________________          Provider Certification    NAME Malvin Brito                                        [facility] 1920                              MRN [id number]    A medical screening exam was performed on the above named patient  Based on the examination:    Condition Necessitating Transfer The encounter diagnosis was Traumatic subdural hemorrhage without loss of consciousness, initial encounter ([facility] [address] )      Patient Condition: The patient has been stabilized such that within reasonable medical probability, no material deterioration of the patient condition or the condition of the unborn child(day) is likely to result from the transfer    Reason for Transfer: Level of Care needed not available at this facility (trauma and neurosurgery)    Transfer Requirements: [place]   · Space available and qualified personnel available for treatment as acknowledged by PACS [phone]  · Agreed to accept transfer and to provide appropriate medical treatment as acknowledged by       Dr Rell Mcgee  · Appropriate medical records of the examination and treatment of the patient are provided at the time of transfer   [address], [address] _______  · Transfer will be performed by qualified personnel from [facility]  and appropriate transfer equipment as required, including the use of necessary and appropriate life support measures  Provider Certification: I have examined the patient and explained the following risks and benefits of being transferred/refusing transfer to the patient/family:  General risk, such as traffic hazards, adverse weather conditions, rough terrain or turbulence, possible failure of equipment (including vehicle or aircraft), or consequences of actions of persons outside the control of the transport personnel, Unanticipated needs of medical equipment and personnel during transport      Based on these reasonable risks and benefits to the patient and/or the unborn child(day), and based upon the information available at the time of the patients examination, I certify that the medical benefits reasonably to be expected from the provision of appropriate medical treatments at another medical facility outweigh the increasing risks, if any, to the individuals medical condition, and in the case of labor to the unborn child, from effecting the transfer      X____________________________________________ DATE 11/12/17        TIME_______      ORIGINAL - SEND TO MEDICAL RECORDS   COPY - SEND WITH PATIENT DURING TRANSFER

## 2017-11-13 NOTE — CASE MANAGEMENT
Initial Clinical Review    Admission: Date/Time/Statement: 11/12/17 @ 2350     Orders Placed This Encounter   Procedures    Inpatient Admission     Standing Status:   Standing     Number of Occurrences:   1     Order Specific Question:   Admitting Physician     Answer:   Bailey Adams [[id number]]     Order Specific Question:   Level of Care     Answer:   Level 2 Stepdown / HOT [14]     Order Specific Question:   Estimated length of stay     Answer:   More than 2 Midnights     Order Specific Question:   Certification     Answer:   I certify that inpatient services are medically necessary for this patient for a duration of greater than two midnights  See H&P and MD Progress Notes for additional information about the patient's course of treatment  ED: Date/Time/Mode of Arrival: 11/12 Transfer from [address] [place] [address],[address] ED    ED Arrival Information     Expected [address] of Arrival Escorted By Service Admission Type    11/12/2017 22:51 11/12/2017 23:16 Immediate Ambulance SLETS DEPARTMENT [facility]) Trauma Emergency    Arrival Complaint    subdural        Chief Complaint:   Chief Complaint   Patient presents with    Fall     Pt is trauma transfer from [facility], multiple falls in the past couple weeks  History of Illness: 80 y  o  female who after a fall from home  Patient denies any symptoms  She has hx of dementia  Patient is not on blood thinners  According to son, patient lives at home but has assistance 3 times daily  He states she has been falling multiples times at home with recently a few days ago but she refused to come to ED  He states today fell when coming back from bathroom and he found her on the ground  Currently mental status at baseline  Waxes and wanes with orientation but usually knows her name and age   Chronic bilateral leg pain with edema but left leg a little worse     ED Vital Signs:   ED Triage Vitals   Temperature Pulse Respirations Blood Pressure SpO2   11/13/17 0130 11/12/17 2317 11/12/17 2317 11/12/17 2317 11/12/17 2317   98 3 °F (36 8 °C) 80 18 (!) 194/77 100 %      Temp Source Heart Rate Source Patient Position - Orthostatic VS BP Location FiO2 (%)   11/12/17 2317 11/12/17 2317 11/12/17 2317 11/12/17 2317 --   Oral Monitor Lying Right arm       Pain Score       11/12/17 2317       Worst Possible Pain        Wt Readings from Last 1 Encounters:   11/13/17 69 4 kg (153 lb 1 6 oz)     Vital Signs (abnormal): B/P = 176/71, 190/ 72, 200/77    Abnormal Labs:   Updated: 11/12/17 2029        Sodium 132 (L) 136 - 145 mmol/L     Potassium 3 6 3 5 - 5 3 mmol/L     Chloride 94 (L) 100 - 108 mmol/L     CO2 28 21 - 32 mmol/L     Anion Gap 10 4 - 13 mmol/L     BUN 38 (H) 5 - 25 mg/dL     Creatinine 1 75 (H) 0 60 - 1 30 mg/dL     Glucose 231 (H) 65 - 140 mg/dL      Updated: 11/13/17 0036        WBC 15 14 (H) 4 31 - 10 16 Thousand/uL     RBC 3 78 (L) 3 81 - 5 12 Million/uL     Hemoglobin 9 8 (L) 11 5 - 15 4 g/dL     Hematocrit 29 7 (L) 34 8 - 46 1 %      Updated: 11/13/17 0859        Potassium 3 2 (L) 3 5 - 5 3 mmol/L      Diagnostic Test Results: CT Head - Acute midline subdural hemorrhage along the anterior falx measuring 15 mm x 5 mm      ED Treatment:   Medication Administration from 11/12/2017 2251 to 11/13/2017 0118       Date/Time Order Dose Route Action     11/13/2017 0015 acetaminophen (TYLENOL) tablet 650 mg 650 mg Oral Given     11/13/2017 0017 donepezil (ARICEPT) tablet 5 mg 5 mg Oral Given     11/13/2017 0021 potassium chloride 10 % oral solution 10 mEq 10 mEq Oral Given     Past Medical/Surgical History:    Active Ambulatory Problems     Diagnosis Date Noted    Essential hypertension 05/02/2017    Elevated troponin 05/02/2017    Traumatic rhabdomyolysis ([address] ) 05/02/2017    UTI (urinary tract infection) 05/02/2017    Fall at home 05/02/2017    Diabetes mellitus type 2, controlled ([facility] [address] ) 05/02/2017    Acute renal failure superimposed on stage 3 chronic kidney disease [facility] ) 05/02/2017    Lactic acidosis 05/02/2017    Dementia 05/02/2017    Dyspnea 05/08/2017    Pulmonary edema 05/08/2017    Acute CHF (congestive heart failure) ([facility] ) 05/08/2017    CKD (chronic kidney disease) stage 3, GFR 30-59 ml/min 05/08/2017    SIRS (systemic inflammatory response syndrome) ([facility] ) 05/08/2017    Accelerated hypertension 05/08/2017     Resolved Ambulatory Problems     Diagnosis Date Noted    No Resolved Ambulatory Problems     Past Medical History:   Diagnosis Date    CHF (congestive heart failure) (HCC)     Chronic pain     Dementia     Diabetes mellitus ([facility] )     Hypertension      Admitting Diagnosis: Subdural hematoma (HCC) [I62 00]  Multiple wounds of skin [R23 8]  Unspecified multiple injuries, initial encounter [T07  XXXA]    Age/Sex: 80 y o  female    Assessment/Plan:   Trauma Alert:  Other Transfer  Model of Arrival: Ambulance    Trauma Active Problems:   Acute midline subdural hemorrhage      Trauma Plan:   - Admit to trauma inpatient  - HOT protocol with q1hr neuro checks  - Nsg consult  - hold on AC  - Diet: regular cardiac diet   - DM: on SSI   - CHF hx: hold lasix with Acute on chronic IVAN  - Acute on chronic IVAN: hold lasix, cautious with fluids with CHF hx  - bilateral LE edema: monitor with wound care  -hx of HTN: continue home meds  - PT/OT  - Pain control:tylenol      Admission Orders:  Neurological checks q1h  Sequential compression device  Wound Care cons  Neurosurgery cons  Gerontology cons  PT/OT eval and eval    Scheduled Meds:   acetaminophen 650 mg Oral Q6H [facility]   docusate sodium 100 mg Oral BID   donepezil 5 mg Oral HS   furosemide 40 mg Oral Daily   glimepiride 4 mg Oral BID   metolazone 2 5 mg Oral Daily   metoprolol succinate 50 mg Oral Daily   senna 1 tablet Oral Daily     Continuous Infusions:    PRN Meds: calcium carbonate    ondansetron

## 2017-11-13 NOTE — CONSULTS
Consultation - [address] [facility] Jaimie 80 y o  female MRN: [id number]  [facility] Encounter: [id number]      Assessment/Plan     1  Recurrent falls  2  Gait dysfunction  - Continue to ensure fall precautions  - PT/OT    3  SAH 2/2 to # 1&2  - Acknowledge management as per primary team     4  Acute pain  -  scalp, likely soft tissue injury  - Continue scheduled Tylenol   - Discontinue Methocarbamol    5  Dementia  - Continue Aricept  - Recommend delirium precautions as well as supportive measures such as reassurance and rediction for confusion or anxiety  - Discontinue methocarbamol and avoid other delirium inducing medications such as benadryl, high dose opiates    6  Diabetes  - No evidence of hypoglycemia  - Continue Glimepiride  - Recommend A1C     7  CHF  -  No evidence of acute exacerbation    8  incontinence  - Continue incontinence care    9  Lower extremity edema  10  Venous ulcers  - Recommend wound care input   - Continue wound dressing   - Elevate legs     11  IVAN on CKD  - Continue to ensure adequate hydration  - Consider reducing dose of Diuretics if worsening  12  Hypokalemia  - To continue to replete as needed  13  Mild left LQ abdominal discomfort/pain,   - likely 2/2 to constipation    - Continue Laxatives  - Monitor for worsening and review as needed  14  DVT PPX  - On no AC: SAH  - No SCDs: LE edema and multiple venous ulcers  - To continue to ambulate as tolerated     15  Disposition  - Patient with advanced dementia, gait dysfunction and recurrent falls  Concerns about safety at home  Recommend SW and CM input to follow up need for higher level or care/placement  History of Present Illness   Physician Requesting Consult: Lance Wright MD  Reason for Consult / Principal Problem: Recurrent falls  Hx and PE limited by: mental state    HPI: Paolo Mcgovern is a 80y o  year old female who presents following mechanical fall at home with associated SAH   Son who was present during evaluation reports recurrent falls since May  He reports patient has had 4 falls in the last 6 mo, she has also been noticed to decline both physically and cognitively since her fall in May  Patient has a history of dementia, on Aricept  Son states her mental state fluctuates  She is sometimes able to make her needs known  Lives alone in her own house  Requires assistance with her ADLs but able to feed herself  Has aides come in 3 times a day  Son also visits daily/every night  He reports she has been resistive to care recently making personal care difficult  She has a walker and is double incontinent  He thinks it is no longer safe for her to live alone and he is considering nursing home placement  He states patient is in the senior life program and there are two nousing home options they have to choose from  He is in the process of deciding  Inpatient consult to Gerontology  Consult performed by: Nicole Chaidez  Consult ordered by: Douglas Chacon          Review of Systems   Reason unable to perform ROS: due to patient's mental state, she however was able to report central scalp pain  Geriatric Conditions: Multiple geriatric conditions as listed in A/P    Historical Information   Past Medical History:   Diagnosis Date    CHF (congestive heart failure) ([facility] [address] )     Chronic pain     Dementia     Diabetes mellitus ([facility] )     Hypertension      Past Surgical History:   Procedure Laterality Date    HYSTERECTOMY       Social History   History   Alcohol Use    Yes     Comment: occassionaly wine drinker     History   Drug Use No     History   Smoking Status    Former Smoker    Quit date: 1947   Smokeless Tobacco    Never Used     Comment: patient no longer a smoker     Family History: non-contributory    Meds/Allergies   all current active meds have been reviewed    No Known Allergies    Objective     Intake/Output Summary (Last 24 hours) at 11/13/17 1037  Last data filed at 11/13/17 0900   Gross per 24 hour   Intake              180 ml   Output              600 ml   Net             -420 ml     Invasive Devices     Peripheral Intravenous Line            Peripheral IV 11/12/17 Right Antecubital less than 1 day                Scheduled Meds:  acetaminophen 650 mg Oral Q6H [address]   docusate sodium 100 mg Oral BID   donepezil 5 mg Oral HS   furosemide 40 mg Oral Daily   glimepiride 4 mg Oral BID   metolazone 2 5 mg Oral Daily   metoprolol succinate 50 mg Oral Daily   senna 1 tablet Oral Daily     Continuous Infusions:   PRN Meds: calcium carbonate    methocarbamol    ondansetron    Physical Exam   Constitutional: No distress  HENT:   Linear bruised and tender area over vertex  No laceration or bleeding  Eyes: Conjunctivae are normal  Right eye exhibits no discharge  Left eye exhibits no discharge  No scleral icterus  Neck: No JVD present  Cardiovascular: Normal rate  Exam reveals no gallop and no friction rub  No murmur heard  Pulmonary/Chest: Effort normal  No stridor  No respiratory distress  She has no wheezes  She has no rales  Abdominal: Soft  Bowel sounds are normal  She exhibits no distension and no mass  There is tenderness  There is no rebound and no guarding  Mild left lower quadrant discomfort/ tenderness  No guarding or rebound tenderness  Musculoskeletal: She exhibits edema and tenderness  BL LE edema  Leg wrapped  Bandages clean and dry  Neurological:   oriented to person but not place or time  Skin: Skin is warm and dry  She is not diaphoretic  As in HEENT exam   Psychiatric:   Appears a little anxious, appropriate in manner, no agitation         Lab Results:   Lab Results   Component Value Date    WBC 15 14 (H) 11/13/2017    WBC 12 29 (H) 07/17/2014    RBC 3 78 (L) 11/13/2017    RBC 3 43 (L) 07/17/2014    HGB 9 8 (L) 11/13/2017    HGB 9 9 (L) 07/17/2014    HCT 29 7 (L) 11/13/2017    HCT 30 5 (L) 07/17/2014    MCV 79 (L) 11/13/2017    MCV 89 07/17/2014    PLT 445 (H) 11/13/2017     07/17/2014     Lab Results   Component Value Date    RZZHDKIL51 350 05/04/2017     No results found for: G8BMLXI, FREET4  No components found for: VITAMIND1, 25-DIHYDROXY  Lab Results   Component Value Date     11/13/2017     07/17/2014    K 3 2 (L) 11/13/2017    K 4 0 07/17/2014     11/13/2017     (L) 07/17/2014    CO2 28 11/13/2017    CO2 23 07/17/2014    ANIONGAP 9 11/13/2017    ANIONGAP 14 (H) 07/17/2014    BUN 35 (H) 11/13/2017    BUN 34 (H) 07/17/2014    CREATININE 1 41 (H) 11/13/2017    CREATININE 1 76 (H) 07/17/2014    GLUCOSE 161 (H) 11/13/2017    GLUCOSE 204 (H) 05/07/2017    GLUCOSE 273 (H) 07/17/2014    CALCIUM 8 3 11/13/2017    CALCIUM 9 4 07/17/2014    AST 40 05/07/2017    ALT 49 05/07/2017    ALKPHOS 69 05/07/2017    PROT 6 6 05/07/2017    ALBUMIN 3 2 (L) 05/07/2017    BILITOT 0 22 05/07/2017    EGFR 31 11/13/2017     Lab Results   Component Value Date    COLORU yellow 11/12/2017    COLORU Yellow 11/12/2017    COLORU Yellow 07/17/2014    CLARITYU clear 11/12/2017    CLARITYU Slightly Cloudy 11/12/2017    CLARITYU Clear 07/17/2014    SPECGRAV 1 010 11/12/2017    SPECGRAV <=1 005 07/17/2014    PHUR 5 0 11/12/2017    PHUR 7 0 07/17/2014    LEUKOCYTESUR Trace (A) 11/12/2017    LEUKOCYTESUR Trace (A) 07/17/2014    NITRITE Negative 11/12/2017    NITRITE Negative 07/17/2014    PROTEINUA Negative 11/12/2017    PROTEINUA Negative 07/17/2014    GLUCOSEU 100 (1/10%) (A) 11/12/2017    GLUCOSEU >=1000 (1%) (A) 07/17/2014    KETONESU Negative 11/12/2017    KETONESU Negative 07/17/2014    BILIRUBINUR Negative 11/12/2017    BILIRUBINUR Negative 07/17/2014    BLOODU Small (A) 11/12/2017    BLOODU Negative 07/17/2014     Lab Results   Component Value Date    ALBUMIN 3 2 (L) 05/07/2017     Lab Results   Component Value Date    INR 1 00 11/12/2017     Lab Results   Component Value Date    BLOODCX No Growth After 5 Days   05/08/2017     Lab Results   Component Value Date    URINECX No Growth <1000 cfu/mL 05/08/2017       Imaging Studies:   Chest X-Ray: I have personally reviewed reports  CT:  I have personally reviewed reports  EKG, Pathology, and Other Studies:   EKG: I have personally reviewed reports  Other Studies: I have personally reviewed reports  Therapies:   PT: I have personally reviewed reports  OT:  I have personally reviewed reports  VTE Prophylaxis: RX contraindicated due to: [facility] & leg ulcer    Code Status: Level 3 - DNAR and DNI  Advance Directive and Living Will: Yes    Power of : Yes  POLST:      Family and Social Support: As in HPI  Living Arrangements: [address] [place] [address] Drive: Children;Home care staff  Assistance Needed: unknown  Type of Current Residence: Private residence  Current Home Care Services: Yes  Type of Current Home Care Services: Home health aide;Nurse visit; Safety alert    Goals of Care: Discussed with son  As in HPI  Placement appears appropriate at this time  SW and CM to follow up  Counseling / Coordination of Care  Total floor / unit time spent today  minutes  Greater than 50% of total time was spent with the patient and / or family counseling and / or coordination of care

## 2017-11-13 NOTE — CASE MANAGEMENT
Notification of Inpatient Admission/Inpatient Authorization Request  This is a Notification of Inpatient Admission/Request for Inpatient Authorization to our facility [facility]  Please be advised that this patient is currently in our facility under Inpatient Status  Below you will find the Attending Physician and Facilitys information including NPI# and contact information for the Utilization  assigned to the [facility] where the patient is receiving services  Please feel free to contact the Utilization Review Department with any questions  Patient Information:  PATIENT NAME: Yevgeniy Anton  MRN: [id number]  YOB: 1920  PRESENTATION DATE: 11/12/2017 11:16 PM  IP ADMISSION DATE: 11/12/17 2350  DISCHARGE DATE: No discharge date for patient encounter  [facility]  Attending Physician:  ENRICO Hurst Junior  Specialty- Trauma Surgery,   [facility] ID- [id number]  [address] [facility] [address] [facility], [address] [facility]  Phone 1: [phone]  Fax: [phone]  Facility:  [address] [address] [facility] [address], [address] [facility] [phone]  NPI: [id number]  TAX ID# [id number]  MEDICARE ID: [id number]  [facility] in the [facility] by [address] for 2017  Network Utilization Review Department  Phone: [phone]; Fax [phone]  ATTENTION: The Network Utilization Review Department is now centralized for our 7 Facilities  Make a note that we have a new phone and fax numbers for our Department  Please call with any questions or concerns to [phone] and carefully follow the prompts so that you are directed to the right person  All voicemails are confidential  Fax any determinations, approvals, denials, and requests for initial or continue stay review clinical to [phone]   Due to HIGH CALL volume, it would be easier if you could please send faxed requests to expedite your requests and in part, help us provide discharge notifications faster

## 2017-11-13 NOTE — PLAN OF CARE
Problem: OCCUPATIONAL THERAPY ADULT  Goal: Performs self-care activities at highest level of function for planned discharge setting  See evaluation for individualized goals  Treatment Interventions: ADL retraining, Visual perceptual retraining, Functional transfer training, Endurance training, Cognitive reorientation, Patient/family training, Equipment evaluation/education, Compensatory technique education, Continued evaluation, Energy conservation, Activityengagement          See flowsheet documentation for full assessment, interventions and recommendations  Limitation: Decreased ADL status, Decreased Safe judgement during ADL, Decreased cognition, Decreased endurance, Decreased self-care trans, Decreased high-level ADLs  Prognosis: Fair, Guarded  Assessment: Pt is a 79 Y/O female who presented to [facility] s/p fall at home w/ +head strike and ?LOC  Pt dx w/ acute midline subdural hematoma  Pt PMH includes ambulatory dysfunction, dementia, CHF, DM, chronic pressure ulcers of B/L LE, and IVAN w/ CKD  Pt's son present for evaluation providing all information regarding baseline function 2' pt being poor historian  According to pt's son; pt was independent in  [facility] prior to previous fall in May  S/p fall, pt requires HHA and family support for ADLS, including dressing and sponge bathing, and assistance w/ IADLS  Pt son reports pt was independent in tileting PTA  Pt was modified independent in functional mobility within the home w/ use of RW PTA  Pt receives assistance via HHA 3x/day (morning, lunch, and afternoon) to receive assistance w/ IADLS and attends [employer] 3x/week  Pt using BSC over toilet and tub transfer bench at baseline  Currently pt is oriented to person only; including name and , excluding age  Pt requires overall max A for ADLS, bed mobility, sit<->stand transfers, and stand pivot transfers w/ use of RW  Pt requires max cues t/o for direction following, sequencing, and safety   Pt became incontinent of urine upon sit ->stand transfer requiring max A x1 for steadying and max A of second for perianal hygiene  Pt currently experiencing the following limitations: pain, decreased activity tolerance, fatigue, overall weakness, deconditioning, disorientation, baseline dementia, decreased insight/judgment/safety awareness, impaired memory, difficulty attending to task, slow to process/respond, unable to follow multi-step commands, decreased sitting tolerance, decreased standing tolerance, impaired balance, increased risk for falls, decreased ADL status, and decreased functional mobility/transfers  Currently recommend pt d/c to short term rehab w/ possible long term placement when medically cleared  Will continue to follow to address the below goals       OT Discharge Recommendation: Short Term Rehab (w/ possible long term placement )  OT - OK to Discharge: Yes (to short term rehab when medically cleared)

## 2017-11-13 NOTE — TERTIARY TRAUMA SURVEY
Progress Note - Tertiary Trauma Survery   Kelly Don 80 y o  female MRN: [id number]  Unit/Bed#: [facility] 902-01 Encounter: [id number]    Summary of Diagnosed Injuries:   Acute midline SDH  ambulatory dysfunction  Dementia  CHF  DM II  Chronic pressure ulcers of b/l lower extremities POA  IVAN on CKD      Clinical Plan:     1  Acute midline SDH  - HOT protocol x 24hrs  - neurosurgery consult   - Repeat CTH pending  - pain control; schedule tylenol Q6hr  - hold VTE ppx    2  Dementia  - continue home Aricept  - geriatric consultation    3  CHF w/o exacerbation  - Toprol-XL 50 mg q day  - Lasix 40 mg p o  Q day  - Zaroxolyn 2 5 mg Qday    4  DM II  - sliding scale insulin  - restart home amaryl    5  IVAN on CKD - resolved  - baseline Cr 1 2-1 4  - PO hydration    6  Chronic pressure ulcers of b/l lower extremities  - wound care consult  7  PT/OT      Ppx: senna/colace      Mechanism of Injury: Fall    Transfer from: [facility]  Outside Films Received: yes  Tertiary Exam Due on: 11/13/17    Vitals: Blood pressure 141/60, pulse 63, temperature 98 4 °F (36 9 °C), temperature source Oral, resp  rate 18, height 5' 2" (1 575 m), weight 69 4 kg (153 lb 1 6 oz), SpO2 100 %  ,Body mass index is 28 kg/m²  CT / RADIOGRAPHS: ALL RESULTS MUST BE CONFIRMED BY FACULTY OR PRINTED REPORT    CT HEAD:    Acute midline subdural hemorrhage along the anterior falx measuring 15 mm x 5 mm  CT CHEST:    CT FACE:  CT ABDOMEN / PELVIS:   CT CERVICAL SPINE:  XR PELVIS:    CT THORACIC / LUMBAR SPINE:  CXR CHEST:    OTHER: L ankle Xray: No acute osseous abnormality   OTHER: R ankle xray: No acute osseous abnormality   OTHER:  OTHER:    OTHER: OTHER:    OTHER:  OTHER:    OTHER:  OTHER:      Consultants - List Service/ Faculty and Date: neurosurgery    Active medications:           Current Facility-Administered Medications:     acetaminophen (TYLENOL) tablet 650 mg, 650 mg, Oral, Q6H [address], 650 mg at 11/13/17 0511    calcium carbonate (TUMS) chewable tablet 1,000 mg, 1,000 mg, Oral, Daily PRN    donepezil (ARICEPT) tablet 5 mg, 5 mg, Oral, HS, 5 mg at 11/13/17 0017    furosemide (LASIX) tablet 40 mg, 40 mg, Oral, Daily    glimepiride (AMARYL) tablet 4 mg, 4 mg, Oral, BID    methocarbamol (ROBAXIN) tablet 500 mg, 500 mg, Oral, Q6H PRN    metolazone (ZAROXOLYN) tablet 2 5 mg, 2 5 mg, Oral, Daily    metoprolol succinate (TOPROL-XL) 24 hr tablet 50 mg, 50 mg, Oral, Daily, 50 mg at 11/13/17 0847    ondansetron (ZOFRAN) injection 4 mg, 4 mg, Intravenous, Q6H PRN    senna (SENOKOT) tablet 8 6 mg, 1 tablet, Oral, Daily, 8 6 mg at 11/13/17 0847      Intake/Output Summary (Last 24 hours) at 11/13/17 1264  Last data filed at 11/13/17 0700   Gross per 24 hour   Intake                0 ml   Output              600 ml   Net             -600 ml       Invasive Devices     Peripheral Intravenous Line            Peripheral IV 11/12/17 Right Antecubital less than 1 day                CAGE-AID Questionnaire:    Was the patient able to participate in the CAGE-AID screening questions on admission? No:   1  Does the patient consume alcohol? a  NO-Patient does not consume alcohol     2  Does the patient use street drugs or abuse prescription drugs? a  NO-Patient does not use street drugs or abuse prescription drugs    Did the patient answer YES in one of the questions above? No              Is the patient 65 years or older: YES:    1  Before the illness or injury that brought you to the Emergency, did you need someone to help you on a regular basis? 1=Yes   2  Since the illness or injury that brought you to the Emergency, have you needed more help than usual to take care of yourself? 1=Yes   3  Have you been hospitalized for one or more nights during the past 6 months (excluding a stay in the Emergency Department)? 1=Yes   4  In general, do you see well? 1=No   5  In general, do you have serious problems with your memory? 1=Yes   6   Do you take more than three different medications everyday? 1=Yes   TOTAL   6     Did you order a geriatric consult if the score was 2 or greater?: yes      1  GCS:  GCS Total:  14, Eye Opening:   Spontaneous = 4, Motor Response: Obeys commands = 6 and Verbal Response:  Confused = 4  2  Head:   a  Inspect and palpate SCALP for:  swelling/ecchymosis:  None, b  Inspect and palpate FACE for:   lac/abrasion:  None, c  Examine EYES Record: pupil size/reaction:  Appropriate,  R 2 mm and reactive  L cataract deformity 3 mm and reactive  d  Jennifer Morfin for:  Present and e  Inspect EARS for:  CSF leak, hemotympanum:  None  3  Neck:   WNL  4  Chest:   WNL  5  Abdomen/Pelvis:   WNL  6  Back (log roll with spinal immobilization unless cleared radiographically): WNL  7  Extremities:   Lacs, abrasions, swelling, ecchymosis:  Multiple chronic ulcerations of the bilateral lower extremities     For times 2 cm ulceration popliteal region of the right lower extremity with purulent  No surrounding cellulitic change  Tenderness, pain with motor, instability: *  8  Peripheral Nerves: WNL    Do NOT use the following abbreviations: DTO, gr, Wen, MS, MSO4, MgSO4, Nitro, QD, QID, QOD, u, , ?, ?g or trailing zeros   Always use a zero before a decimal     Labs:   CBC:   Lab Results   Component Value Date    WBC 15 14 (H) 11/13/2017    HGB 9 8 (L) 11/13/2017    HCT 29 7 (L) 11/13/2017    MCV 79 (L) 11/13/2017     (H) 11/13/2017    MCH 25 9 (L) 11/13/2017    MCHC 33 0 11/13/2017    RDW 16 1 (H) 11/13/2017    MPV 9 9 11/13/2017     CMP:   Lab Results   Component Value Date     11/13/2017     11/13/2017    CO2 28 11/13/2017    ANIONGAP 9 11/13/2017    BUN 35 (H) 11/13/2017    CREATININE 1 41 (H) 11/13/2017    GLUCOSE 161 (H) 11/13/2017    CALCIUM 8 3 11/13/2017    EGFR 31 11/13/2017

## 2017-11-13 NOTE — PROGRESS NOTES
Fentanyl patch removed from pt's right back  On from home  Flushed down toilet, Elan Onofre, RN witnessed waste

## 2017-11-13 NOTE — PHYSICAL THERAPY NOTE
Physical Therapy Evaluation    Patient's Name: Korin Jeronimo    Admitting Diagnosis  Subdural hematoma ([facility] [address] ) [I62 00]  Multiple wounds of skin [R23 8]  Unspecified multiple injuries, initial encounter [T07  XXXA]    Problem List  Patient Active Problem List   Diagnosis    Essential hypertension    Elevated troponin    Traumatic rhabdomyolysis (HCC)    UTI (urinary tract infection)    Fall at home    Diabetes mellitus type 2, controlled ([facility] [address] )    Acute renal failure superimposed on stage 3 chronic kidney disease (HCC)    Lactic acidosis    Dementia    Dyspnea    Pulmonary edema    Acute CHF (congestive heart failure) (HCC)    CKD (chronic kidney disease) stage 3, GFR 30-59 ml/min    SIRS (systemic inflammatory response syndrome) (HCC)    Accelerated hypertension    Subdural hematoma (HCC)    Multiple falls    History of CHF (congestive heart failure)    Acute-on-chronic kidney injury ([facility] [address] )    Dementia       Past Medical History  Past Medical History:   Diagnosis Date    CHF (congestive heart failure) (HCC)     Chronic pain     Dementia     Diabetes mellitus ([facility] [address] )     Hypertension        Past Surgical History  Past Surgical History:   Procedure Laterality Date    HYSTERECTOMY          11/13/17 1039   Note Type   Note type Eval/Treat   Pain Assessment   Pain Assessment FLACC   Pain Rating: FLACC (Rest) - Face 0   Pain Rating: FLACC (Rest) - Legs 0   Pain Rating: FLACC (Rest) - Activity 0   Pain Rating: FLACC (Rest) - Cry 0   Pain Rating: FLACC (Rest) - Consolability 0   Score: FLACC (Rest) 0   Pain Rating: FLACC (Activity) - Face 1   Pain Rating: FLACC (Activity) - Legs 1   Pain Rating: FLACC (Activity) - Activity 1   Pain Rating: FLACC (Activity) - Cry 1   Pain Rating: FLACC (Activity) - Consolability 1   Score: FLACC (Activity) 5   Home Living   Type of Home House   Home Layout One level;Ramped entrance  (ramp + 1 ANNABEL + 1 step inside)   Bathroom Shower/Tub Tub/shower unit   [employer] Commode;Tub transfer bench   [facility]   Additional Comments Pt's son present providing all information as patient is a ? historian and cognitively impaired  Pt lives alone in a 1 floor home with a ramped entrance + 1+1 step inside  Pt has HHA 3x/day and attends [employer] 3x/week   Prior Function   Level of [place] Needs assistance with IADLs; Needs assistance with ADLs and functional mobility   Lives With Alone   Receives Help From Family;Home health   ADL Assistance Needs assistance   IADLs Needs assistance   Falls in the last 6 months 1 to 4   Vocational Retired   Restrictions/Precautions   [place] Bearing Precautions Per Order No  (activity as tolerated per Carlos Enrique-trauma PA-C)   Other Precautions Cognitive; Chair Alarm; Bed Alarm;Multiple lines;O2;Fall Risk;Pain  (impaired skin integrity->soft care when OOB)   General   Family/Caregiver Present Yes  (son present)   Cognition   Overall Cognitive Status Impaired   Arousal/Participation Cooperative   Attention Attends with cues to redirect   Orientation Level Oriented to person;Oriented to place; Disoriented to time;Disoriented to situation   Memory Decreased recall of precautions;Decreased recall of recent events;Decreased short term memory;Decreased recall of biographical information  (pt reports being in her 90s; not oriented to time )   Following Commands Follows one step commands with increased time or repetition   Comments Pt with baseline dementia  Lethargic but arousable and able to particiapte   Pt moans during all movement which her son reprots is baseline    RUE Assessment   RUE Assessment WFL   LUE Assessment   LUE Assessment WFL   RLE Assessment   RLE Assessment WFL  (2+/5)   LLE Assessment   LLE Assessment WFL  (2+/5)   Coordination   Movements are Fluid and Coordinated 0   Coordination and Movement Description bradykinetic; antalgic    Sensation WFL   Light Touch   RLE Light Touch Impaired   RLE Light Touch Comments impaired skin integrity; hyperalgesic    LLE Light Touch Impaired   LLE Light Touch Comments impaired skin integrity; hyperalgesic    Proprioception   RLE Proprioception Impaired   LLE Proprioception Impaired   Bed Mobility   Supine to Sit 2  Maximal assistance   Additional items Assist x 1   Additional Comments Pt with complaints of pain but no reported dizziness sitting EOB  Pt able to maintain for 30seconds with CGA  Pt able to stand for appx 30seconds for wound care RN to assess skin integrity  During static standing, pt incontinenct of bladder  Transfers   Sit to Stand 2  Maximal assistance   Additional items Assist x 1   Stand to Sit 2  Maximal assistance   Additional items Assist x 1   Stand pivot 2  Maximal assistance   Additional items Assist x 1   Additional Comments Pt appropraite for stand pivot transfer  Max Ax1 with additional person present for safety during session  Pt required increased time and max verbal instruciton    Ambulation/Elevation   Gait pattern R Foot drag;L Foot drag;Poor UE support; Improper Weight shift;Decreased foot clearance; Short stride; Step to;Excessively slow   Gait Assistance 2  Maximal assist   Additional items Assist x 1   Assistive Device Rolling walker   Distance 3'   Balance   Static Sitting Fair   Dynamic Sitting Fair -   Static Standing Poor   Dynamic Standing Poor   Endurance Deficit   Endurance Deficit Yes   Endurance Deficit Description ongoing deconditioning    Activity Tolerance   Activity Tolerance Patient limited by fatigue;Patient limited by pain   Medical Staff Made Aware CM; Chise-OT   Nurse Made Aware appropriate to see per Elke Harden RN    Assessment   Prognosis Fair   Problem List Decreased strength;Decreased range of motion;Decreased endurance; Impaired balance;Decreased mobility; Decreased coordination;Decreased cognition;Decreased safety awareness;Decreased skin integrity;Pain   Assessment Pt is a 80year old female presenting s/p fall at home  PT consulted to assess functional mobility and assist with safe d c planning  PT eval performed with verbal activity orders per Carlos Enrique-trauma PA-C  Pt with an extensive problem list which includes HTN, elevated troponin, traumatic rhabdomyolysis, UTI, falls, ARF, dementia, pulmonary edema, CHF, CKD, SIRS  OFr a more comprehensive list, please see above  PTA, pt living home alone  She has a supportive son, HHA 3x/day and goes to [employer] to 3x/wk  Pt ambulates with a RW and son admits to a h/o falls  Pt's son admits to impaired cognition with concern for patient's safety at home  On eval, pt presenting with the following deficits; impaired balance, poor tolerance to activity, decreased strength and ROM and decreased overall functional mobility  Pt in supine upon arrival and agreeable to participate in PT session  Pt oriented to self and place but not time or situation  Pt required max Ax1 for bed mobility, transfers and ambulation of short distances with RW vs mod Ax2  PT to continue to follow pt during stay to progress functional mobility (I and safety  Rehab vs long term SNF placement at d/c     Barriers to Discharge Decreased caregiver support   Barriers to Discharge Comments lives alone    Goals   Patient Goals none expressed   LTG Expiration Date 11/27/17   Long Term Goal #1 Pt will be Ce with bed mobility  Pt will be Ce with transfers  Pt will be Ce with short distance ambulation using a RW  Pt will particiapte in HEP  Pt's balance iwll improve to Fair(-)  Pt will toelrate 40 minute session with limtied rest     Treatment Day 0   Plan   Treatment/Interventions Functional transfer training;LE strengthening/ROM; Therapeutic exercise; Endurance training;Cognitive reorientation;Patient/family training;Equipment eval/education; Bed mobility;Gait training;Spoke to nursing;Spoke to case management;OT   PT Frequency Other (Comment)  (6x/wk)   Recommendation   Recommendation Short-term skilled PT;Long-term skilled nursing home placement   Equipment Recommended Wheelchair;Walker   PT - OK to Discharge Yes  (to SNF when medically appropriate )   Additional Comments OOB in chair with alarm activated and all needs within reach  Wound care RN at bedside      Barthel Index   Feeding 5   Bathing 0   Grooming Score 0   Dressing Score 5   Bladder Score 0   Bowels Score 10   Toilet Use Score 0   Transfers (Bed/Chair) Score 5   Mobility (Level Surface) Score 0   Stairs Score 0   Barthel Index Score 25       Val Quintana, PT

## 2017-11-13 NOTE — H&P
H&P Exam - Trauma   Meche Oliver 80 y o  female MRN: [id number]  Unit/Bed#: ED 01 Encounter: [id number]  Assessment/Plan   Trauma Alert: Other Transfer  Model of Arrival: Ambulance  Trauma Team: Attending Kyung Olmedo and Residents Edwin Montez  Consultants: Neurosurgery: subdural hematoma  Time of Arrival:       Trauma Active Problems:   Acute midline subdural hemorrhage      Trauma Plan:   - Admit to trauma inpatient  - HOT protocol with q1hr neuro checks  - Nsg consult  - hold on AC  - Diet: regular cardiac diet   - DM: on SSI   - CHF hx: hold lasix with Acute on chronic IVAN  - Acute on chronic IVAN: hold lasix, cautious with fluids with CHF hx  - bilateral LE edema: monitor with wound care  -hx of HTN: continue home meds  - PT/OT  - Pain control:tylenol     Dispo: trauma      Chief Complaint: my legs hurt     History of Present Illness         HPI:  Meche Oliver is a 80 y o  female who after a fall from home  Patient denies any symptoms  She has hx of dementia  Patient is not on blood thinners  According to son, patient lives at home but has assistance 3 times daily  He states she has been falling multiples times at home with recently a few days ago but she refused to come to ED  He states today fell when coming back from bathroom and he found her on the ground  Currently mental status at baseline  Waxes and wanes with orientation but usually knows her name and age  Chronic bilateral leg pain with edema but left leg a little worse      Mechanism:Fall  Medical History        Past Medical History:   Diagnosis Date    CHF (congestive heart failure) (HCC)      Chronic pain      Dementia      Diabetes mellitus (HCC)      Hypertension              Review of Systems   Constitutional: Negative  HENT: Negative  Eyes: Negative  Respiratory: Negative  Cardiovascular: Negative  Gastrointestinal: Negative  Endocrine: Negative  Genitourinary: Negative  Musculoskeletal: Negative  Skin: Negative      Neurological: Negative  Hematological: Negative  Psychiatric/Behavioral: Negative  All other systems reviewed and are negative         Historical Information         History is unobtainable from the patient due to dementia   Efforts to obtain history included the following sources: family member     Medical History        Past Medical History:   Diagnosis Date    CH[facility]congestive heart failure) ([facility] [address] )      Chronic pain      Dementia      Diabetes mellitus ([facility] )      Hypertension           Surgical History         Past Surgical History:   Procedure Laterality Date    HYSTERECTOMY             Social History              History   Alcohol Use    Yes       Comment: occassionaly wine drinker          History   Drug Use No            History   Smoking Status    Former Smoker    Quit date: 1947   Smokeless Tobacco    Never Used       Comment: patient no longer a smoker      There is no immunization history for the selected administration types on file for this patient    Last Tetanus: unsure  Family History: Non-contributory           Meds/Allergies   all current active meds have been reviewed     No Known Allergies        PHYSICAL EXAM        Objective   Vitals:   First set: Temperature: 97 9 °F (36 6 °C) (11/12/17 1923)  Pulse: 84 (11/12/17 1923)  Respirations: 18 (11/12/17 1923)  Blood Pressure: 129/68 (11/12/17 1923)     Primary Survey:   (A) Airway: intact  (B) Breathing: clear to asculatation bilaterally  (C) Circulation: Pulses:   normal  (D) Disabliity:  GCS Total:  14 confusion  (E) Expose:  Completed       Physical Exam         Invasive Devices            Peripheral Intravenous Line                     Peripheral IV 11/12/17 Right Antecubital less than 1 day                      Lab Results:   BMP/CMP:         Lab Results   Component Value Date      (L) 11/12/2017     K 3 6 11/12/2017     CL 94 (L) 11/12/2017     CO2 28 11/12/2017     ANIONGAP 10 11/12/2017     BUN 38 (H) 11/12/2017     CREATININE 1 75 (H) 11/12/2017     GLUCOSE 231 (H) 11/12/2017     CALCIUM 8 8 11/12/2017     EGFR 24 11/12/2017   , CBC:         Lab Results   Component Value Date     WBC 13 82 (H) 11/12/2017     HGB 9 3 (L) 11/12/2017     HCT 28 9 (L) 11/12/2017     MCV 80 (L) 11/12/2017      (H) 11/12/2017     MCH 25 6 (L) 11/12/2017     MCHC 32 2 11/12/2017     RDW 16 1 (H) 11/12/2017     MPV 10 6 11/12/2017   , Coagulation:         Lab Results   Component Value Date     INR 1 00 11/12/2017   , Urinalysis:         Lab Results   Component Value Date     COLORU yellow 11/12/2017     COLORU Yellow 11/12/2017     CLARITYU clear 11/12/2017     CLARITYU Slightly Cloudy 11/12/2017     SPECGRAV 1 010 11/12/2017     PHUR 5 0 11/12/2017     LEUKOCYTESUR Trace (A) 11/12/2017     NITRITE Negative 11/12/2017     PROTEINUA Negative 11/12/2017     GLUCOSEU 100 (1/10%) (A) 11/12/2017     KETONESU Negative 11/12/2017     BILIRUBINUR Negative 11/12/2017     BLOODU Small (A) 11/12/2017    and Troponin: No results found for: TROPONINI  Imaging/EKG Studies:   Other Studies:   CTH: Acute midline subdural hemorrhage along the anterior falx measuring 15 mm x 5 mm    XR R ankle:  XR L ankle:     Code Status: Prior  Advance Directive and Living Will: Yes    Power of : Yes  POLST:

## 2017-11-13 NOTE — ED PROVIDER NOTES
History  Chief Complaint   Patient presents with    Fall     Pt presents to ED for evaluation s/p fall  Pt being seen by [facility] visiting nurses for swelling of b/l LEs  Nurse reports pt has had increased swelling in same  Chronic wounds noted  Pt denies any pain from fall     80year-old female brought in by EMS after a fall at home  Patient believes she was in the parlor her home when she fell  Patient contacted EMS and was transported here  Patient does live with her elderly son  I spoke with patient's physician, Dr Gerson Butler who reports the patient is in a senior living program with intense home nursing, physical therapy and geriatric care  He requested the patient be worked up but that if she does not have an acute head injury that she be admitted to [facility] in [place] to the skilled nursing facility where for further evaluation and treatment  History provided by:  Patient   used: No    Fall   Mechanism of injury: fall    Incident location:  Home  Arrived directly from scene: yes    Fall:     Fall occurred:  Standing    Impact surface:  Hard floor    Point of impact:  Buttocks    Entrapped after fall: no    Suspicion of alcohol use: no    Suspicion of drug use: no    Tetanus status:  Up to date  Prior to arrival data:     Bystander interventions:  First aid    Patient ambulatory at scene: no      Blood loss:  None    Responsiveness at scene:  Alert    Loss of consciousness: no      Amnesic to event: no      Medications administered:  None    Immobilization:  None    Disability condition since incident:  Stable  Associated symptoms: no back pain, no chest pain, no difficulty breathing, no headaches, no loss of consciousness, no nausea and no vomiting    Risk factors: kidney disease    Risk factors: no anticoagulation therapy        Prior to Admission Medications   Prescriptions Last Dose Informant Patient Reported? Taking?    FENTANYL TD   Yes Yes   Sig: Place 1 patch on the skin every third day   acetaminophen (TYLENOL) 325 mg tablet 11/11/2017 at 1800  Yes Yes   Sig: Take 325 mg by mouth every 6 (six) hours as needed for mild pain   donepezil (ARICEPT) 5 mg tablet   Yes Yes   Sig: Take 5 mg by mouth daily at bedtime   furosemide (LASIX) 40 mg tablet   No Yes   Sig: Take 1 tablet by mouth daily for 30 days   glimepiride (AMARYL) 4 mg tablet   Yes Yes   Sig: Take 4 mg by mouth 2 (two) times a day     metolazone (ZAROXOLYN) 2 5 mg tablet   Yes Yes   Sig: Take 2 5 mg by mouth daily   metoprolol succinate (TOPROL-XL) 50 mg 24 hr tablet   Yes Yes   Sig: Take 50 mg by mouth daily   potassium chloride (MICRO-K) 10 MEQ CR capsule   Yes Yes   Sig: Take 10 mEq by mouth daily      Facility-Administered Medications: None       Past Medical History:   Diagnosis Date    [facility]congestive [facility] failure) ([facility])     Chronic pain     Dementia     Diabetes mellitus ([facility] )     Hypertension        Past Surgical History:   Procedure Laterality Date    HYSTERECTOMY         History reviewed  No pertinent family history  I have reviewed and agree with the history as documented  Social History   Substance Use Topics    Smoking status: Former Smoker     Quit date: 1947    Smokeless tobacco: Never Used      Comment: patient no longer a smoker    Alcohol use Yes      Comment: occassionaly wine drinker        Review of Systems   Constitutional: Negative for chills, diaphoresis and fever  Respiratory: Negative for shortness of breath  Cardiovascular: Positive for leg swelling  Negative for chest pain and palpitations  Gastrointestinal: Negative for diarrhea, nausea and vomiting  Genitourinary: Negative for dysuria and frequency  Musculoskeletal: Positive for gait problem  Negative for back pain  Skin: Positive for wound (chronic multiple lower extremity wounds)  Negative for rash  Neurological: Negative for loss of consciousness and headaches     All other systems reviewed and are negative  Physical Exam  ED Triage Vitals [11/12/17 1923]   Temperature Pulse Respirations Blood Pressure SpO2   97 9 °F (36 6 °C) 84 18 129/68 100 %      Temp Source Heart Rate Source Patient Position - Orthostatic VS BP Location FiO2 (%)   Oral Monitor Lying Right arm --      Pain Score       No Pain           Orthostatic Vital Signs  Vitals:    11/12/17 1923 11/12/17 2100   BP: 129/68 155/64   Pulse: 84 90   Patient Position - Orthostatic VS: Lying Lying       Physical Exam   Constitutional: She is oriented to person, place, and time  She appears well-developed and well-nourished  She appears distressed (mild)  HENT:   Head: Normocephalic and atraumatic  Eyes: EOM are normal  Pupils are equal, round, and reactive to light  Neck: Normal range of motion  No JVD present  Cardiovascular: Normal rate, regular rhythm and intact distal pulses  Exam reveals no gallop and no friction rub  Murmur (holo systolic) heard  Pulmonary/Chest: Effort normal and breath sounds normal  No respiratory distress  She has no wheezes  She has no rales  She exhibits no tenderness  Musculoskeletal: Normal range of motion  She exhibits edema and tenderness  She exhibits no deformity  Neurological: She is alert and oriented to person, place, and time  Skin: Skin is warm and dry  Multiple ulcerative wounds to bilateral lower extremities, undergoing local wound care  Psychiatric: She has a normal mood and affect  Her behavior is normal  Judgment and thought content normal    Nursing note and vitals reviewed  ED Medications  Medications   sodium chloride 0 9 % bolus 500 mL (0 mL Intravenous Stopped 11/12/17 2030)       Diagnostic Studies  Results Reviewed     Procedure Component Value Units Date/Time    Blood culture #2 [[id number]] Collected:  11/12/17 1953    Lab Status: In process Specimen:  Blood from Arm, Right Updated:  11/12/17 2120    Blood culture #1 [[id number]] Collected:  11/12/17 2116    Lab Status:   In process Specimen:  Blood from Arm, Left Updated:  11/12/17 2120    Urine Microscopic [[id number]]  (Abnormal) Collected:  11/12/17 2018    Lab Status:  Final result Specimen:  Urine from Urine, Clean Catch Updated:  11/12/17 2058     RBC, UA 0-1 (A) /hpf      WBC, UA 0-1 (A) /hpf      Epithelial Cells Occasional /hpf      Bacteria, UA Occasional /hpf      Hyaline Casts, UA 0-1 (A) /lpf      MUCOUS THREADS Occasional    UA w Reflex to Microscopic w Reflex to Culture [[id number]]  (Abnormal) Collected:  11/12/17 2018    Lab Status:  Final result Specimen:  Urine from Urine, Clean Catch Updated:  11/12/17 2038     Color, UA Yellow     Clarity, UA Slightly Cloudy     Specific [place], UA 1 010     pH, UA 5 0     Leukocytes, UA Trace (A)     Nitrite, UA Negative     Protein, UA Negative mg/dl      Glucose,  (1/10%) (A) mg/dl      Ketones, UA Negative mg/dl      Urobilinogen, UA 0 2 E U /dl      Bilirubin, UA Negative     Blood, UA Small (A)    Basic metabolic panel [[id number]]  (Abnormal) Collected:  11/12/17 1952    Lab Status:  Final result Specimen:  Blood from Arm, Right Updated:  11/12/17 2029     Sodium 132 (L) mmol/L      Potassium 3 6 mmol/L      Chloride 94 (L) mmol/L      CO2 28 mmol/L      Anion Gap 10 mmol/L      BUN 38 (H) mg/dL      Creatinine 1 75 (H) mg/dL      Glucose 231 (H) mg/dL      Calcium 8 8 mg/dL      eGFR 24 ml/min/1 73sq m     Narrative:         National Kidney Disease Education Program recommendations are as follows:  GFR calculation is accurate only with a steady state creatinine  Chronic Kidney disease less than 60 ml/min/1 73 sq  meters  Kidney failure less than 15 ml/min/1 73 sq  meters      Zaina Roca [[id number]]  (Normal) Collected:  11/12/17 1952    Lab Status:  Final result Specimen:  Blood from Arm, Right Updated:  11/12/17 2028     Protime 13 5 seconds      INR 1 00    APTT [[id number]]  (Normal) Collected:  11/12/17 1952    Lab Status:  Final result Specimen:  Blood from Arm, Right Updated:  11/12/17 2028     PTT 26 seconds     Narrative: Therapeutic Heparin Range = 60-90 seconds    POCT urinalysis dipstick [[id number]]  (Normal) Resulted:  11/12/17 2021    Lab Status:  Final result Specimen:  Urine Updated:  11/12/17 2024     Color, UA yellow     Clarity, UA clear     EXT Glucose, UA negative     EXT Bilirubin, UA (Ref: Negative) negative     EXT Ketones, UA (Ref: Negative) negative     EXT Spec Grav, UA 1 010     EXT Blood, UA (Ref: Negative) small     EXT pH, UA 5     EXT Protein, UA (Ref: Negative) negative     EXT Urobilinogen, UA (Ref: 0 2- 1 0) 0 2     EXT Leukocytes, UA (Ref: Negative) negative     EXT Nitrite, UA (Ref: Negative) negative    CBC and differential [[id number]]  (Abnormal) Collected:  11/12/17 1952    Lab Status:  Final result Specimen:  Blood from Arm, Right Updated:  11/12/17 2010     WBC 13 82 (H) Thousand/uL      RBC 3 63 (L) Million/uL      Hemoglobin 9 3 (L) g/dL      Hematocrit 28 9 (L) %      MCV 80 (L) fL      MCH 25 6 (L) pg      MCHC 32 2 g/dL      RDW 16 1 (H) %      MPV 10 6 fL      Platelets 793 (H) Thousands/uL      Neutrophils Relative 83 (H) %      Lymphocytes Relative 10 (L) %      Monocytes Relative 6 %      Eosinophils Relative 1 %      Basophils Relative 0 %      Neutrophils Absolute 11 31 (H) Thousands/µL      Lymphocytes Absolute 1 39 Thousands/µL      Monocytes Absolute 0 89 Thousand/µL      Eosinophils Absolute 0 18 Thousand/µL      Basophils Absolute 0 05 Thousands/µL                  CT head without contrast   Final Result by Daly Veronica MD (11/12 2103)      Acute midline subdural hemorrhage along the anterior falx measuring 15 mm x 5 mm           ##cfslh   I personally discussed this result with Ban Kirkpatrick on 11/12/2017 9:03 PM    ##                Workstation performed: [id number]                    Procedures  Procedures       Phone Contacts  ED Phone Contact    ED Course  ED Course                                MDM  Number of Diagnoses or Management Options  Traumatic subdural hemorrhage without loss of consciousness, initial encounter [facility]): new and requires workup  Diagnosis management comments: Background: 80 y o  female presents with fall    Differential DX includes but is not limited to: generalized deconditioning, metabolic derangement, questionable cellulitis lower extremities, chf    Plan: septic workup with ct head, possible transfer [facility]         Amount and/or Complexity of Data Reviewed  Clinical lab tests: ordered and reviewed  Tests in the radiology section of CPT®: ordered and reviewed    Risk of Complications, Morbidity, and/or Mortality  Presenting problems: high  Diagnostic procedures: high  Management options: high  General comments: I called Dr Mansi Nation and updated him regarding patient's diagnosis and disposition  He will follow the patient's case remotely and will hopefully arrange for [facility] upon discharge from [facility]  Patient Progress  Patient progress: stable    CritCare Time    Disposition  Final diagnoses:   Traumatic subdural hemorrhage without loss of consciousness, initial encounter [facility])     Time reflects when diagnosis was documented in both MDM as applicable and the Disposition within this note     Time User Action Codes Description Comment    11/12/2017  9:47 PM Ryan Rodrigues Add [S06 [id number]] Traumatic subdural hemorrhage without loss of consciousness, initial encounter [facility])       ED Disposition     ED Disposition Condition Comment    Transfer to Another [address] [place] [address] Lynda Etienne should be transferred out to [facility] under trauma (Dr Swetha Sapp)        MD Documentation    Abdiaziz Jones Most Recent Value   Patient Condition  The patient has been stabilized such that within reasonable medical probability, no material deterioration of the patient condition or the condition of the unborn child(day) is likely to result from the transfer   Reason for Transfer  Level of Care needed not available at this facility [facility] neurosurgery]   Benefits of Transfer  Specialized equipment and/or services available at the receiving facility (Include comment)________________________ [facility] neurosurgery]   Accepting Physician  Dr Ari Thomas Name, [address] [facility]    (Name & Tel number)  PACS    Transported by (Company and Unit #)  [employer]  Provider Certification  General risk, such as traffic hazards, adverse weather conditions, rough terrain or turbulence, possible failure of equipment (including vehicle or aircraft), or consequences of actions of persons outside the control of the transport personnel, Unanticipated needs of medical equipment and personnel during transport      RN Documentation    Flowsheet Row Most [address] [facility] Name, [address] [facility]    (Name & Tel number)  PACS    Transported by (Company and Unit #)  [employer]      Follow-up Information    None       Patient's Medications   Discharge Prescriptions    No medications on file     No discharge procedures on file      ED Provider  Electronically Signed by           Shivani Johnson MD  11/12/17 0824

## 2017-11-13 NOTE — SOCIAL WORK
CM met with pt and her son to complete the open  Pt lives alone in a 1 story home with 2STE  Pt needs maximum assistance with all ADLs and has the help of private caregivers through [employer]  Pt's son also helps in the evenings  Pt owns a walker and BSC  Pt has a living will  Pt obtains her medication through [employer]  Pt has no hx of mental health or substance abuse   Pt's son would like a referral [facility], pt was accepted and will d/c once medically stable

## 2017-11-14 NOTE — DISCHARGE SUMMARY
Trauma Discharge Summary - Siva Trammell 80 y o  female MRN: [id number]    Unit/Bed#: [facility] 902-01 Encounter: [id number]    Admission Date: 11/12/2017     Admitting Diagnosis: Subdural hematoma ([address] ) [I62 00]  Multiple wounds of skin [R23 8]  Unspecified multiple injuries, initial encounter [T07  XXXA]    HPI:    Siva Trammell is 80 year female with a significant past medical history of dementia, CHF, hypertension,diabetes, and chronic decubitus/lower extremity ulcerations  Her initially presented to [address] [place] [address] 11/12/17 after she sustained a fall upon returning from bathroom and was found on ground in home  Per report patient has been experiencing multiple falls in the preceding days however had refused to come to the hospital   Patient lives at home alone, but has assistance 3 times daily  Patient confused at baseline oriented only to name and date of birth  CT imaging revealed acute midline subdural hemorrhage measuring 50 mm x 5 mm  Patient does not take ASA, or blood thinners  Subsequently she was transferred to [facility] for trauma evaluation and neurosurgical consultation  Procedures Performed: No orders of the defined types were placed in this encounter  Hospital Course:     Patient was admitted to trauma service under hot protocol, with neurosurgical consultation  She maintained GCS 14 with which appears to be baseline for patient  Repeat CT head revealed interval improvement in acute SDH  She was cleared from neurosurgical standpoint  Upon arrival patient was also noted to have mild he had CKD  Patient received dental p o  hydration given underlying CHF  Acute IVAN has resolved patient has returned to baseline of 1 4  During the course of this admission patient was also evaluated by wound care services, for multiple bilateral lower extremity chronic pressure/chronic venous stasis ulcerations  Upon discharge is encouraged to continue local wound care    Patient was evaluated by Geriatric Medicine there was concern that right leg pain may be attributed to DVT  Patient reassessed bedside she has multiple unstageable ulcerations behind the posterior knee were pain was noted  There is no swelling or tenderness in the calf or thigh pain is localized to ulcerated regions  It was felt that patient is unsafe to return home, living alone and she is no longer able to care for herself     This has been discussed with son  Patient will [facility] to [facility]  At this time patient is stable, and is at baseline state of health  She is cleared from Neurosurgery/trauma standpoint  Information has been provided to follow up on as-needed basis  Significant Findings, Care, Treatment and Services Provided:   11/12: CTH Acute midline subdural hemorrhage along the anterior falx measuring 15 mm x 5 mm   11/12: Xray R ankle: No acute osseous abnormality  11/12 xray L ankle: No acute osseous abnormality  11/13: CTH Interval decrease in anterior falcine subdural hematoma        No new or additional abnormality  Complications: N/A    Discharge Diagnosis:     SDH  Dementia  Hypertension  Hyperlipidemia  DM II      Physical Exam   HENT:   Head: Normocephalic and atraumatic  Right Ear: External ear normal    Left Ear: External ear normal    Eyes: Conjunctivae are normal  Pupils are equal, round, and reactive to light  R pupil 2mm and reactive  L cataract  3 mm and reactive  Neck: Normal range of motion  Neck supple  Cardiovascular: Normal rate, regular rhythm, normal heart sounds and intact distal pulses  No murmur heard  Pulmonary/Chest: Effort normal and breath sounds normal  No respiratory distress  She has no wheezes  She has no rales  Abdominal: Soft  Bowel sounds are normal  She exhibits no distension and no mass  There is no tenderness  There is no guarding  Musculoskeletal: Normal range of motion  She exhibits no edema     Neurological:   Oriented to person and date of birth  Follows commands appropriately  Moves all 4 extremities without difficulties  Strength 5/5 both upper extremities  4-5 mm bilateral lower extremities  Skin:   Multiple chronic bilateral lower extremity ulcerations/pressure ulcers  Two unstageable ulcers of bilateral lower extremities behind the knees  Two stage II decubitus ulcerations POA  Condition at Discharge: stable     Discharge instructions/Information to patient and family:   See after visit summary for information provided to patient and family  Provisions for Follow-Up Care:  See after visit summary for information related to follow-up care and any pertinent home health orders  [address] at [facility]    Planned Readmission: No    Discharge Statement   I spent 25 minutes discharging the patient  This time was spent on the day of discharge  I had direct contact with the patient on the day of discharge  Additional documentation is required if more than 30 minutes were spent on discharge  Discharge Medications:  See after visit summary for reconciled discharge medications provided to patient and family

## 2017-11-14 NOTE — PROGRESS NOTES
Progress Note - Julia Jones 80 y o  female MRN: [id number]    [facility] Encounter: [id number]      Assessment/Plan     1  Recurrent falls  2  Gait dysfunction  - Continue to ensure fall precautions  - Continue PT/OT    3  SAH 2/2 to # 1&2  - Cont management as per primary team     4  Acute pain, soft tissue scalp injury   - Continue scheduled Tylenol  Acute pain right popliteal fossa  - To consider r/o DVT  - Discussed with Trauma resident who will follow up  - Lidocaine patch    5  Dementia  - Continue Aricept  - Continue delirium precautions     6  Diabetes  - Continue Glimepiride  - Follow up  A1C     7  CHF  -  No evidence of acute exacerbation    8  incontinence  - Continue incontinence care    9  Lower extremity edema, venous stasis dermatitis  10  Multiple BL LE ulcers & Gluteal pressure ulcers  - Continue management as recommended by wound care    11  IVAN on CKD  - Continue to ensure adequate hydration  - Consider reducing dose of Diuretics if worsening  12  Hypokalemia  - To continue to replete as needed  13  Mild left LQ abdominal discomfort/pain,  likely 2/2 to constipation    - Continue Laxatives    14  DVT PPX  - To continue to ambulate as tolerated     15  Disposition  - Patient with advanced dementia, gait dysfunction and recurrent falls  Concerns about safety at home  Follow up CM input regarding need for higher level or care/placement  Planned discharge to rehab today  Discussed with primary team to follow up need for doppler to r/o DVT RLE  Subjective:   Patient seen and examined  C/o pain ongoing back pain and pain over posterior right knee otherwise no new issues  Planned discharge today to rehab  Objective:     Vitals: Blood pressure 140/63, pulse 81, temperature 97 9 °F (36 6 °C), temperature source Oral, resp  rate 16, height 5' 2" (1 575 m), weight 70 6 kg (155 lb 10 3 oz), SpO2 100 %  ,Body mass index is 28 47 kg/m²        Intake/Output Summary (Last 24 hours) at 11/14/17 1016  Last data filed at 11/14/17 0915   Gross per 24 hour   Intake              578 ml   Output             1353 ml   Net             -775 ml       Scheduled Meds:  acetaminophen 650 mg Oral Q6H [address]   docusate sodium 100 mg Oral BID   donepezil 5 mg Oral HS   furosemide 40 mg Oral Daily   glimepiride 4 mg Oral BID   metolazone 2 5 mg Oral Daily   metoprolol succinate 50 mg Oral Daily   senna 1 tablet Oral Daily     Continuous Infusions:   PRN Meds: calcium carbonate    ondansetron  Physical Exam: /63   Pulse 81   Temp 97 9 °F (36 6 °C) (Oral)   Resp 16   Ht 5' 2" (1 575 m)   Wt 70 6 kg (155 lb 10 3 oz)   SpO2 100%   BMI 28 47 kg/m²        Vitals:    11/13/17 2342 11/14/17 0305 11/14/17 0600 11/14/17 0700   BP: 150/54 136/64  140/63   Pulse: 72 80  81   Resp: 18 18  16   Temp: 97 9 °F (36 6 °C)   97 9 °F (36 6 °C)   TempSrc: Oral   Oral   SpO2: 100% 98%  100%   Weight:   70 6 kg (155 lb 10 3 oz)    Height:         General Appearance:    Alert, cooperative, some distress due to pain distress, appears stated age   Head:    Normocephalic, without obvious abnormality   Eyes:    PERRL, conjunctiva/corneas clear,  both eyes   Ears:    Normal external ear both ears   Nose:   Nares normal, septum midline, mucosa normal, no drainage    or sinus tenderness   Throat:   Lips, mucosa, and tongue normal; l   Neck: supple   Back:     Symmetric, no curvature, ROM normal, no CVA tenderness   Lungs:     Clear to auscultation bilaterally, respirations unlabored        Heart:    Regular rate and rhythm, S1 and S2 normal,   or gallop       Abdomen:     Soft, non-tender, bowel sounds active ,               Extremities:   Bandage to both proximal legs BL, clean and dry  Tenderness over right popliteal fossa  No apparent selling or bruising or increased warmth       Skin:   Skin color, texture, turgor normal, no rashes or lesions       Neurologic: Awake and alert   Unable to cooperate for full exam  Invasive Devices     Peripheral Intravenous Line            Peripheral IV 11/12/17 Right Antecubital 1 day                Lab, Imaging and other studies: I have personally reviewed pertinent reports      VTE Pharmacologic Prophylaxis: Reason for no pharmacologic prophylaxis SAH multiple, leg ulcers  VTE Mechanical Prophylaxis: reason for no mechanical VTE prophylaxis SAH, multiple leg ulcer and swelling

## 2017-11-14 NOTE — SOCIAL WORK
Pt will d/c to [id number] via [facility]   CM will inform pt's son

## 2017-11-15 PROBLEM — I62.03 SUBDURAL HEMATOMA, CHRONIC (HCC): Status: ACTIVE | Noted: 2017-01-01

## 2017-11-15 PROBLEM — R41.82 ALTERED MENTAL STATUS: Status: ACTIVE | Noted: 2017-01-01

## 2017-11-15 PROBLEM — R56.9 SEIZURE (HCC): Status: ACTIVE | Noted: 2017-01-01

## 2017-11-15 NOTE — PROCEDURES
Continuous Video EEG  Long Term Monitoring    Patient Name:  Afshan Duran  MRN: [id number]   :  1920 File #: Oneida Boles    Age: 80 y o  Encounter #: [id number]   Date performed: 11/15/2017 Referring Provider: Odilon Miller DO          Report date: 11/15/2017          Study type: Continuous video EEG, up to 24 hours    ICD 10 diagnosis: Spells/Fit NOS R56 9    Start time: 11/15/2017 01:11  End time: 2017 08:00    Patient History:  Patient is 80 y o  female on continuous video EEG monitoring for the assessment of seizures, characterization of events, and effect of treatment  Patient was recently discharged after stable subfalcine subdural hematoma, return to the hospital after witnessed seizure  Patient at baseline has dementia however on assessment she had low Whittier coma score concerning for subclinical seizures  Current AEDs:  Medications include:   chlorhexidine 15 mL Swish & Spit Q12H [address]   heparin (porcine) 5,000 Units Subcutaneous Q8H [address]   insulin lispro 2-12 Units Subcutaneous TID AC   insulin regular      levETIRAcetam 1,000 mg Intravenous Once   levETIRAcetam 750 mg Intravenous Q12H [address]   LORazepam 0 5 mg Intravenous Once   piperacillin-tazobactam 2 25 g Intravenous Q6H   vancomycin 17 5 mg/kg Intravenous Q24H       Description of Procedure:   A 24 hours continuous video EEG was performed with electrodes applied using the International 10-20 System at least 16 channels are reviewed and formatted into longitudinal bipolar, transverse bipolar, and referential (to common reference or calculated common reference) montages  Additional electrodes used included T1, T2, and extraocular electrodes, and ECG, along with video recording  The EEG was recorded with the patient encephalopathic state  A monitoring technologist supervised the continuous recording  The recording was technically satisfactory        Findings:   Background Activity:   At the start of study there is excessive EMG artifact that obscures the background  However, it seems like there is organization of very low voltage beta-alpha activity anteriorly and low voltage alpha-theta activity posteriorly better over the left hemisphere  The right hemisphere has intermittent polymorphic 0 5-2 Hz delta activity and a mixture of frequencies  Abnormal findings:   Around 3AM, the EMG artifact started to go away, as the patient enters sleep, the posterior alpha-theta activity is no longer present, more diffuse low voltage activity  Low-moderate voltage polymorphic 2-3 Hz delta activity is present over the right temporal region  Around 5AM, there is episodic recurrent 5-6 Hz theta activity over the right posterior quadrant that stand out from the background  By 7AM, there is intermittent sharply contoured poorly organized 3-4 Hz waveforms over the right temporal region, appears to be electropositive around T4 and T6  At 07:30 T4/T6 sharp waves become more rhythmical at 5 Hz followed by rhythmic 2 Hz delta activity over the right temporal region over the course of 1-2 minutes  Up to 8AM, these focal seizures over the right mid-posterior temporal region recur every 2-4 minutes  There is no clear clinical correlation to these electrographic seizures  Other findings: The single lead ECG shows an irregular cardiac rhythm  Interpretation: This is an abnormal nearly 7 hours continuous video EEG recording due to right hemispheric slowing, maximal over the right temporal region, with recurrent rhythmical activity slowly becoming more organized around 6-7AM into focal electrographic seizures of the right temporal region, and establishing focal electrographic status epilepticus  These findings were communicated to the primary team to consider escalation of anticonvulsant therapy          MD Hossein Scruggs [facility]  [address] [place] [address]

## 2017-11-15 NOTE — ED PROVIDER NOTES
History  Chief Complaint   Patient presents with    Seizure - New Onset     pt was discharged today from [facility] for a head bleed, sent to [facility] for rehab  son was visiting today at dinner and noticed she was having trouble with hand movements  pt started with seizure activity and left sided gaze   HPI:  79 y/o female presenting to the ER s/p witnessed seizure like activity   Patient was eating dinner at local nursing home when she was noted to have repeated "jerking" movements of her left upper extremity  EMS was called and patient was given 4mg Ativan which ended seizure like movements  Upon arrival to the ER patient is breathing spontaneously with a GCS of three  Son is at bedside and confirms DNR/DNI status  Patient was recently d/c from this hospital after sustaining a traumatic subdural hematoma      Assessment:  -Seizure activity  -recent subdural hematoma   Plan:  -CT head   -Will give Keppra   -Will consult trauma           Seizure - New Onset       Prior to Admission Medications   Prescriptions Last Dose Informant Patient Reported?  Taking?   acetaminophen (TYLENOL) 325 mg tablet   No No   Sig: Take 1 tablet by mouth every 6 (six) hours as needed for mild pain   donepezil (ARICEPT) 5 mg tablet   Yes No   Sig: Take 5 mg by mouth daily at bedtime   fentaNYL (DURAGESIC) 25 mcg/hr   Yes No   Sig: Place 1 patch on the skin every third day   furosemide (LASIX) 40 mg tablet   No No   Sig: Take 1 tablet by mouth daily for 30 days   glimepiride (AMARYL) 4 mg tablet   Yes No   Sig: Take 4 mg by mouth 2 (two) times a day     metolazone (ZAROXOLYN) 2 5 mg tablet   Yes No   Sig: Take 2 5 mg by mouth daily   metoprolol succinate (TOPROL-XL) 50 mg 24 hr tablet   Yes No   Sig: Take 50 mg by mouth daily   potassium chloride (MICRO-K) 10 MEQ CR capsule   Yes No   Sig: Take 10 mEq by mouth daily      Facility-Administered Medications: None       Past Medical History:   Diagnosis Date    CHF [facility]congestive [facility] failure) ([facility] )     Chronic pain     Dementia     Diabetes mellitus ([facility] )     Hypertension        Past Surgical History:   Procedure Laterality Date    HYSTERECTOMY         Family History   Problem Relation Age of Onset    Family history unknown: Yes     I have reviewed and agree with the history as documented  Social History   Substance Use Topics    Smoking status: Former Smoker     Quit date: 1947    Smokeless tobacco: Never Used      Comment: patient no longer a smoker    Alcohol use Yes      Comment: occassionaly wine drinker        Review of Systems   Unable to perform ROS: Mental status change   Neurological: Positive for seizures  Physical Exam  ED Triage Vitals   Temperature Pulse Respirations Blood Pressure SpO2   11/14/17 2017 11/14/17 2016 11/14/17 2016 11/14/17 2017 11/14/17 2016   98 8 °F (37 1 °C) 100 (!) 23 (!) 217/85 99 %      Temp Source Heart Rate Source Patient Position - Orthostatic VS BP Location FiO2 (%)   11/14/17 2017 11/14/17 2016 11/14/17 2045 11/14/17 2045 --   Tympanic Monitor Lying Left arm       Pain Score       11/14/17 2016       No Pain           Orthostatic Vital Signs  Vitals:    11/15/17 0100 11/15/17 0107 11/15/17 0207 11/15/17 0307   BP: 168/78 158/92 (!) 163/103 (!) 186/81   Pulse:  80 92 90   Patient Position - Orthostatic VS:           Physical Exam   Constitutional: She is oriented to person, place, and time  HENT:   Head: Normocephalic and atraumatic  Right Ear: External ear normal    Left Ear: External ear normal    Nose: Nose normal    Mouth/Throat: Oropharynx is clear and moist    Eyes: Conjunctivae and EOM are normal  Pupils are unequal    Neck: Normal range of motion  Neck supple  No JVD present  No tracheal deviation present  No thyromegaly present  Cardiovascular: Normal rate, regular rhythm, normal heart sounds and intact distal pulses  Exam reveals no gallop and no friction rub  No murmur heard    Pulmonary/Chest: Effort normal and breath sounds normal  No stridor  No respiratory distress  She has no wheezes  She has no rales  She exhibits no tenderness  Abdominal: Soft  Bowel sounds are normal  She exhibits no distension and no mass  There is no tenderness  There is no rebound and no guarding  No hernia  Musculoskeletal: Normal range of motion  She exhibits no edema, tenderness or deformity  Lymphadenopathy:     She has no cervical adenopathy  Neurological: She is alert and oriented to person, place, and time  She has normal reflexes  She displays normal reflexes  No cranial nerve deficit  She exhibits normal muscle tone  Coordination normal    Skin: Skin is warm  No rash noted  No erythema  No pallor  Psychiatric: She has a normal mood and affect  Her behavior is normal  Judgment and thought content normal    Nursing note and vitals reviewed        ED Medications  Medications   chlorhexidine (PERIDEX) 0 12 % oral rinse 15 mL (15 mL Swish & Spit Given 11/15/17 0002)   multi-electrolyte (ISOLYTE-S PH 7 4 equivalent) IV solution (100 mL/hr Intravenous Rate/Dose Verify 11/15/17 0201)   levETIRAcetam (KEPPRA) 500 mg in sodium chloride 0 9 % 100 mL IVPB (not administered)   insulin lispro (HumaLOG) 100 units/mL subcutaneous injection 1-5 Units (2 Units Subcutaneous Given 11/15/17 0316)   metoprolol (LOPRESSOR) injection 5 mg (5 mg Intravenous Given 11/15/17 0144)   insulin regular (HumuLIN R,NovoLIN R) 100 units/mL injection **AcuDose Override Pull** (  Return to [facility] 11/15/17 0235)   fentanyl citrate (PF) 100 MCG/2ML 25 mcg (25 mcg Intravenous Given 11/15/17 0321)    EMS REPLENISHMENT MED ( Does not apply Given to EMS 11/14/17 2035)   levETIRAcetam (KEPPRA) 1,500 mg in sodium chloride 0 9 % 100 mL IVPB (0 mg Intravenous Stopped 11/14/17 2107)   multi-electrolyte (ISOLYTE-S PH 7 4) bolus 1,000 mL (0 mL Intravenous Stopped 11/15/17 0201)       Diagnostic Studies  Results Reviewed     Procedure Component Value Units Date/Time    CBC with differential [[id number]]     Lab Status:  No result Specimen:  Blood     Basic metabolic panel [[id number]]     Lab Status:  No result Specimen:  Blood     Lactic acid [[id number]]     Lab Status:  No result Specimen:  Blood     MRSA culture [[id number]] Collected:  11/15/17 0020    Lab Status: In process Specimen:  Nares from Nose Updated:  11/15/17 0023    Comprehensive metabolic panel [[id number]]  (Abnormal) Collected:  11/14/17 2021    Lab Status:  Final result Specimen:  Blood from Arm, Left Updated:  11/14/17 2056     Sodium 133 (L) mmol/L      Potassium 3 6 mmol/L      Chloride 97 (L) mmol/L      CO2 30 mmol/L      Anion Gap 6 mmol/L      BUN 28 (H) mg/dL      Creatinine 1 51 (H) mg/dL      Glucose 312 (H) mg/dL      Calcium 8 8 mg/dL      AST 16 U/L      ALT 11 (L) U/L      Alkaline Phosphatase 95 U/L      Total Protein 7 5 g/dL      Albumin 2 6 (L) g/dL      Total Bilirubin 0 26 mg/dL      eGFR 29 ml/min/1 73sq m     Narrative:         National Kidney Disease Education Program recommendations are as follows:  GFR calculation is accurate only with a steady state creatinine  Chronic Kidney disease less than 60 ml/min/1 73 sq  meters  Kidney failure less than 15 ml/min/1 73 sq  meters  POCT troponin [[id number]]  (Normal) Collected:  11/14/17 2019    Lab Status:  Final result Updated:  11/14/17 2033     POC Troponin I 0 01 ng/ml      Specimen Type VENOUS    Narrative:         [employer] i-Stat handheld analyzer 99% cutoff is > 0 08ng/mL in [facility]    o cTnI 99% cutoff is useful only when applied to patients in the clinical setting of myocardial ischemia  o cTnI 99% cutoff should be interpreted in the context of clinical history, ECG findings and possibly cardiac imaging to establish correct diagnosis  o cTnI 99% cutoff may be suggestive but clearly not indicative of a coronary event without the clinical setting of myocardial ischemia      CBC and differential [[id number]]  (Abnormal) Collected:  11/14/17 2021 Lab Status:  Final result Specimen:  Blood from Arm, Left Updated:  11/14/17 2031     WBC 22 06 (H) Thousand/uL      RBC 3 98 Million/uL      Hemoglobin 10 5 (L) g/dL      Hematocrit 32 0 (L) %      MCV 80 (L) fL      MCH 26 4 (L) pg      MCHC 32 8 g/dL      RDW 16 4 (H) %      MPV 10 2 fL      Platelets 625 (H) Thousands/uL      nRBC 0 /100 WBCs      Neutrophils Relative 61 %      Lymphocytes Relative 28 %      Monocytes Relative 8 %      Eosinophils Relative 3 %      Basophils Relative 0 %      Neutrophils Absolute 13 33 (H) Thousands/µL      Lymphocytes Absolute 6 16 (H) Thousands/µL      Monocytes Absolute 1 75 (H) Thousand/µL      Eosinophils Absolute 0 58 Thousand/µL      Basophils Absolute 0 06 Thousands/µL     POCT Chem 8+ [[id number]]  (Abnormal) Collected:  11/14/17 2022    Lab Status:  Final result Updated:  11/14/17 2027     SODIUM, I-STAT 135 (L) mmol/l      Potassium, i-STAT 3 5 mmol/L      Chloride, istat 95 (L) mmol/L      CO2, i-STAT 31 mmol/L      Anion Gap, Istat 14 (H) mmol/L      Calcium, Ionized i-STAT 1 18 mmol/L      BUN, I-STAT 32 (H) mg/dl      Creatinine, i-STAT 1 3 mg/dl      eGFR 35 ml/min/1 73sq m      Glucose, i-STAT 333 (H) mg/dl      Hct, i-STAT 34 (L) %      Hgb, i-STAT 11 6 g/dl      Specimen Type VENOUS    POCT Blood Gas (CG8+) [[id number]]  (Abnormal) Collected:  11/14/17 2022    Lab Status:  Final result Updated:  11/14/17 2026     ph, Alvarez ISTAT 7 239 (LL)     pCO2, Alvarez i-STAT 76 6 (HH) mm HG      pO2, Alvarez i-STAT 102 0 (H) mm HG      BE, i-STAT 3 mmol/L      HCO3, Alvarez i-STAT 32 7 (H) mmol/L      CO2, i-STAT 35 (H) mmol/L      O2 Sat, i-STAT 96 %      SODIUM, I-STAT 135 (L) mmol/l      Potassium, i-STAT 3 5 mmol/L      Calcium, Ionized i-STAT 1 19 mmol/L      Hct, i-STAT 33 (L) %      Hgb, i-STAT 11 2 (L) g/dl      Glucose, i-STAT 337 (H) mg/dl      Specimen Type VENOUS    POCT urinalysis dipstick [[id number]]     Lab Status:  No result Specimen:  Urine                  CT head without contrast   Final Result by Anastacia Mims MD (11/14 2049)      Decreased size of the known tiny anterior subfalcine subdural hematoma  No new findings  Workstation performed: [id number]         XR chest portable    (Results Pending)         Procedures  Procedures      Phone Consults  ED Phone Contact    ED Course  ED Course as of Nov 15 0405   Tue Nov 14, 2017 2056 Will attempt to call trauma service considering patient was discharged from trauma service this morning  MDM  Number of Diagnoses or Management Options  Altered mental status: new and requires workup  Pressure injury of skin: new and requires workup  Seizure [facility]): new and requires workup     Amount and/or Complexity of Data Reviewed  Clinical lab tests: ordered and reviewed  Tests in the radiology section of CPT®: ordered and reviewed  Tests in the medicine section of CPT®: ordered and reviewed  Decide to obtain previous medical records or to obtain history from someone other than the patient: yes  Independent visualization of images, tracings, or specimens: yes    Patient Progress  Patient progress: stable    CritCare Time    Disposition  Final diagnoses:   Seizure ([address] )   Altered mental status   Pressure injury of skin     Time reflects when diagnosis was documented in both MDM as applicable and the Disposition within this note     Time User Action Codes Description Comment    11/14/2017  9:11 PM Krystle Odonnell Add [R56 9] Seizure ([address] )     11/14/2017  9:11 PM Krystle Odonnell Add [R41 82] Altered mental status     11/15/2017  1:38 AM Petar Chavarria Add [L89 90] Pressure injury of skin       ED Disposition     ED Disposition Condition Comment    Admit  Case was discussed with Arvind Braden and the patient's admission status was agreed to be Admission Status: inpatient status to the service of Dr Arvind Braden           Follow-up Information    None       Current Discharge Medication List      CONTINUE these medications which have NOT CHANGED    Details   acetaminophen (TYLENOL) 325 mg tablet Take 1 tablet by mouth every 6 (six) hours as needed for mild pain  Qty: 30 tablet, Refills: 0      donepezil (ARICEPT) 5 mg tablet Take 5 mg by mouth daily at bedtime      fentaNYL (DURAGESIC) 25 mcg/hr Place 1 patch on the skin every third day      furosemide (LASIX) 40 mg tablet Take 1 tablet by mouth daily for 30 days  Qty: 30 tablet, Refills: 0      glimepiride (AMARYL) 4 mg tablet Take 4 mg by mouth 2 (two) times a day        metolazone (ZAROXOLYN) 2 5 mg tablet Take 2 5 mg by mouth daily      metoprolol succinate (TOPROL-XL) 50 mg 24 hr tablet Take 50 mg by mouth daily      potassium chloride (MICRO-K) 10 MEQ CR capsule Take 10 mEq by mouth daily           No discharge procedures on file  ED Provider  Attending physically available and evaluated Jaxon Lantigua I managed the patient along with the ED Attending      Electronically Signed by         Rosalee Cranker, DO  Resident  11/15/17 3113

## 2017-11-15 NOTE — SOCIAL WORK
Voicemail left for Oleg Cheryl [facility] [phone] [facility] [employer]   They will assist with any hospice related needs

## 2017-11-15 NOTE — SOCIAL WORK
Cm spoke to pt's worker Gale Fox from [employer]  She is aware of the pt's situation and will assist in any capacity   If pt were to go comfort care or hospice, pt will need to be referred to [facility] where she will live there under this pretense

## 2017-11-15 NOTE — ED NOTES
Dr Will Flores reports NO stroke alert to be called at this time       Quincy Sloer, RN  11/14/17 7534

## 2017-11-15 NOTE — CASE MANAGEMENT
Initial Clinical Review    Admission: Date/Time/Statement: 11/14/17 @ 2112     Orders Placed This Encounter   Procedures    Inpatient Admission     Standing Status:   Standing     Number of Occurrences:   1     Order Specific Question:   Admitting Physician     Answer:   Bushra Jolley     Order Specific Question:   Level of Care     Answer:   Critical Care [15]     Order Specific Question:   Bed Type     Answer:   [place] [4]     Order Specific Question:   Estimated length of stay     Answer:   More than 2 Midnights     Order Specific Question:   Certification     Answer:   I certify that inpatient services are medically necessary for this patient for a duration of greater than two midnights  See H&P and MD Progress Notes for additional information about the patient's course of treatment   Inpatient Admission     Standing Status:   Standing     Number of Occurrences:   1     Order Specific Question:   Admitting Physician     Answer:   Bushra Jolley     Order Specific Question:   Level of Care     Answer:   Critical Care [15]     Order Specific Question:   Bed Type     Answer:   Trauma [7]     Order Specific Question:   Estimated length of stay     Answer:   More than 2 Midnights     Order Specific Question:   Certification     Answer:   I certify that inpatient services are medically necessary for this patient for a duration of greater than two midnights  See H&P and MD Progress Notes for additional information about the patient's course of treatment           ED: Date/Time/Mode of Arrival:   ED Arrival Information     Expected Arrival Acuity Means of Arrival Escorted By Service Admission Type    - 11/14/2017 20:12 Immediate Ambulance R [facility] EMS Trauma Emergency    Arrival Complaint    seizure          Chief Complaint:   Chief Complaint   Patient presents with    Seizure - New Onset     pt was discharged today from [facility] for a head bleed, sent to [facility] for rehab  son was visiting today at dinner and noticed she was having trouble with hand movements  pt started with seizure activity and left sided gaze  History of Illness:    Patrick Cuevas is a 80 y o  female who presents from [facility] after a witnessed seizure  Patient was admitted to [facility] 2 days ago after a fall for a subfalcine subdural hematoma  She was discharged today to [facility]  While there, she was reported to whole body twitching, L sided gaze, and then went unresponsive  She was given Ativan by EMS and seizure activity stopped  Upon arrival to the ED, patient has continued to be unresponsive and only withdrawals to pain  Son is at bedside and states that patient is DNR/DNI  She had a repeat CTH, which showed decreased size pf previous bleed  No known new trauma             ED Vital Signs:   ED Triage Vitals   Temperature Pulse Respirations Blood Pressure SpO2   11/14/17 2017 11/14/17 2016 11/14/17 2016 11/14/17 2017 11/14/17 2016   98 8 °F (37 1 °C) 100 (!) 23 (!) 217/85 99 %      Temp Source Heart Rate Source Patient Position - Orthostatic VS BP Location FiO2 (%)   11/14/17 2017 11/14/17 2016 11/14/17 2045 11/14/17 2045 --   Tympanic Monitor Lying Left arm       Pain Score       11/14/17 2016       No Pain        Wt Readings from Last 1 Encounters:   11/14/17 67 7 kg (149 lb 4 oz)       Vital Signs (abnormal):       11/14/17 2130  --  82   23  117/53  --  100 %  Simple mask  Lying   11/14/17 2115  --  84   25  130/58  --  100 %  Simple mask  Lying   11/14/17 2100  --  84   27  148/64  --  100 %  Simple mask  Lying   11/14/17 2045  --  92   32   192/76  --  100 %  Simple mask  Lying     Date/Time  Temp  Pulse  Resp  BP  MAP (mmHg)  SpO2  O2 Device  Patient Position - Orthostatic VS   11/15/17 1200  99 8 °F (37 7 °C)  92   32  131/56  86  99 %  Nasal cannula  Lying   11/15/17 0800   101 7 °F (38 7 °C)  82   24  140/70  105  100 %  Nasal cannula       Date and Time Eye Opening Best Verbal Response Best Motor Response Ilia Coma Scale Score   11/15/17 0100 1 1 4 6   11/15/17 0000 1 1 2 4   11/14/17 2300 1 1 2 4   11/14/17 2200 2 1 2 5   11/14/17 2130 2 1 2 5   11/14/17 2100 2 1 2 5   11/14/17 2045 2 1 2 5   11/14/17 2022 2 1 2 5   11/14/17 1215 4 4 6 14   11/14/17 0815 4 4 6 14   11/14/17 0400 4 4 6 14           Abnormal Labs/Diagnostic Test Results:    She is unresponsive  GCS eye subscore is 1  GCS verbal subscore is 1  GCS motor subscore is 4  Unresponsive  Withdrawals to pain in all four extremities  Component Value Date      (L) 11/14/2017     CL 97 (L) 11/14/2017     BUN 28 (H) 11/14/2017     CREATININE 1 51 (H) 11/14/2017     GLUCOSE 337 (H) 11/14/2017     GLUCOSE 333 (H) 11/14/2017     ALT 11 (L) 11/14/2017     ALBUMIN 2 6 (L) 11/14/2017     Component Value Date     WBC 22 06 (H) 11/14/2017     HGB 11 2 (L) 11/14/2017     HCT 32 0 (L) 11/14/2017     MCV 80 (L) 11/14/2017      (H) 11/14/2017     MCH 26 4 (L) 11/14/2017     RDW 16 4 (H) 11/14/2017     CT Head: Decreased size of the known tiny anterior subfalcine subdural hematoma  No new findings  CHEST X RAY   Left lower lobe alveolar opacity attributed to pneumonia and/or aspiration          ED Treatment:   Medication Administration from 11/14/2017 2012 to 11/14/2017 2227       Date/Time Order Dose Route     11/14/2017 2052 levETIRAcetam (KEPPRA) 1,500 mg in sodium chloride 0 9 % 100 mL IVPB 1,500 mg Intravenous       Past Medical/Surgical History:    Active Ambulatory Problems     Diagnosis Date Noted    Essential hypertension 05/02/2017    Elevated troponin 05/02/2017    Traumatic r[facility]domyolysis [facility] ) 05/02/2017    UTI (urinary tract infection) 05/02/2017    Fall at home 05/02/2017    Diabetes mellitus type 2, controlled ([facility] ) 05/02/2017    Acute renal failure superimposed on stage 3 chronic kidney disease ([facility] ) 05/02/2017    Lactic acidosis 05/02/2017    Dementia 05/02/2017    Dyspnea 05/08/2017    Pulmonary edema 05/08/2017  Acute CHF (congestive heart failure) ([facility] ) 05/08/2017    CKD (chronic kidney disease) stage 3, GFR 30-59 ml/min 05/08/2017    SIRS (systemic inflammatory response syndrome) ([place] [address] ) 05/08/2017    Accelerated hypertension 05/08/2017    Subdural hematoma ([place] [address] ) 11/12/2017    Multiple falls 11/12/2017    History of CHF (congestive heart failure) 11/12/2017    Acute-on-chronic kidney injury ([place] [address] ) 11/12/2017    Dementia 11/12/2017     Resolved Ambulatory Problems     Diagnosis Date Noted    No Resolved Ambulatory Problems     Past Medical History:    [facility]    Chronic pain    Dementia    Diabetes mellitus ([place] [address] )    Hypertension       Admitting Diagnosis: Seizure ([place] [address] ) [R56 9]  Altered mental status [R41 82]    Age/Sex: 80 y o  female    Assessment/Plan:    Trauma Active Problems:   1  Seizure  2  Altered Mental Status   3  SDH     Trauma Plan:   1  Admit to trauma- ICU  2  Neurology consult/ neurochecks  3  EEG/ Keppra BID  4  Diet NPO  5  Pallitive consult to discuss goals of care  6  DVT ppx- SCDs  7   Code Status- DNR/DNI        Admission Orders:    24 HOUR VIDEO EEG MONITORING  FINGER STCK GLUCOSE  Q6 HR  CONSULT PALLIATIVE CARE  NPO   NEURO CHECKS Q1 HR  CONSULT NEUROLOGY         Scheduled Meds:   chlorhexidine 15 mL Swish & Spit Q12H [address]   heparin (porcine) 5,000 Units Subcutaneous Q8H [address]   insulin lispro 2-12 Units Subcutaneous TID AC   insulin regular      levETIRAcetam 750 mg Intravenous Q12H [address]   piperacillin-tazobactam 2 25 g Intravenous Q6H   vancomycin 17 5 mg/kg Intravenous Q24H     Continuous Infusions:   multi-electrolyte 100 mL/hr Last Rate: Stopped (11/15/17 0931)     PRN Meds:   acetaminophen    fentanyl citrate (PF)    hydrALAZINE    metoprolol

## 2017-11-15 NOTE — PROGRESS NOTES
Brief Palliative Progress Note:    Palliative notified about hypoxia and patient being placed on HiFLO NC  Her son Mercedes Jerez) and his support system are at bedside  They have spoken with my colleague- Dr Kristin Anaya this morning  Unfortunately, her insurance does not have a hospice benefit and furthermore, Nathaly Dennison, does not want her to return to [facility]  Given her worsening status, I met with Nathaly Dennison briefly to clarify goals of care  Current plan:  1  LEVEL 3- DNR/DNI  2  NO ESCALATION OF CARE  3  Once Nathaly Dennison has processed all of the events, he will be ready to transition her to comfort measures only  This will likely occur in the morning       Aarti Voss, DO  Palliative and Supportive Care  [phone]

## 2017-11-15 NOTE — ED ATTENDING ATTESTATION
Philip Ho MD, saw and evaluated the patient  I have discussed the patient with the resident/non-physician practitioner and agree with the resident's/non-physician practitioner's findings, Plan of Care, and MDM as documented in the resident's/non-physician practitioner's note, except where noted  All available labs and Radiology studies were reviewed  At this point I agree with the current assessment done in the Emergency Department  I have conducted an independent evaluation of this patient a history and physical is as follows:  Recent admit for SDH   D/c today to NH from trauma service       Unresponsive  Seizure like activity no new trauma   Ativan 4 mg by EMS  Total     Exam gcs 3     DNI  DNR     Will obtain CT and load with Keppra    Worsening CNS bleed vs seizure  Status   Vs metabolic         Trauma consulted for admission  D/w pt son        Critical Care Time  The patient presented with a condition in which there was a high probability of imminent or life-threatening deterioration, and critical care services (excluding separately billable procedures) totalled 30-74 minutes

## 2017-11-15 NOTE — SOCIAL WORK
Maura met with pt's son  Pt is known to the trauma service, as she was discharged yesterday to [facility]  Pt's son explains that pt would not want aggressive medical treatment due to her DNR/DNI status  Pt's son requested hospice consult which was placed by MAURA  Palliative also involved

## 2017-11-15 NOTE — PROGRESS NOTES
Progress Note/Acceptance note - Critical Care   Rosanna Dorsey 80 y o  female MRN: [id number]  Unit/Bed#: ICU 11 Encounter: [id number]    Assessment:    1) Decreased but Persistent subfalcine subdural hematoma  2) GCS 6-7  3) Pressure sores  4) Leukocytosis    Plan:                Neuro:  GCS 6-7  Withdrawals extremities except left upper  No attempted eye opening  Did "moan" x  1  Had tonic-clonic activity per son  However patient is shaking in the room  May represent rigors versus tremor versus less likely seizure-like activity  - Received loading dose of Keppra in emergency department  Will place on 500 b i d   - Patient on nasal cannula respiratory status is tenuous  An patient is a DNI  Will attempt to avoid benzodiazepines to avoid decreasing respiratory rate further   - Patient level 3 DNR/DNI  Palliative care consult  - Attempting to minimize sedation  Did receive fentanyl x1 with correction of tremor   - Nerology consult for seizure activity  - 24 hour EEG - spoke with epileptologist ----> did speak with epileptologist overnight no events initially when hooked up to EEG                 CV:  Echocardiogram 5542 grade 2 diastolic dysfunction  Ejection fraction 60% however significant hypokinesis  - Did check lactic to assess if elevated in setting of tonic-clonic like seizure  Was negative  This goes against grand mal seizure  However could be still having subclinical versus focal seizures  - Point care troponin unremarkable  - Takes metoprolol at home added p r n  metoprolol IV as needed for heart rate control  Lung:  Hypoxia intermittently on nasal cannula  Can escalate to BiPAP the patient is DNI  Not able to be compliant with incentive spirometry  - VBG CO2 improved significantly  Saturations consistently greater than 95% on nasal cannula 6 L  - Did obtain chest x-ray to eval for signs of infection    Left cardiac border hazy consistent with stat chest x-ray earlier this year   Patient high risk for aspiration  GI:  NPO Given mental status  No clear indication for GI prophylaxis                 FEN:  Isolyte @ 100 mL/hour  Received 2 L IV fluid bolus  Replete potassium  Unable to tolerate p o  and no OG tube  Can only replete IV will start with 20 mEq  Creatinine on admission 1 51-----> 1 25  :  Urinating spontaneously  However Dean was placed due to stage III buttock wounds  Urinalysis unremarkable                 ID:  Does have significant leukocytosis of 22,000  Urinalysis and chest x-ray unremarkable  May be stress related  Patient very high risk for aspiration  No pain on abdominal exam   No clear source at this time  Has been afebrile  - Leukocytosis continues to rise 22---->28 1  Multiple ulcers on lower extremities heels and toes  Possible source of infection could be osteomyelitis  Will consider plain films of bilateral lower extremities  Heme:  Will hold off on chemical DVT prophylaxis given subdural will await Neurosurgery recommendations   Cannot do mechanical DVT prophylaxis due to extensive leg ulcers  Endo:  Diabetes at baseline  Elevated glucose  Is NPO so will not treat aggressively  Place on subq insulin algorithm #2  Msk/Skin:  Patient unable to get out of bed  Extensive lower extremity wounds  Wound plan is already in chart from November 13th  Will Re consult  - palliative Care consult                 Disposition:  ICU level of care for GCS of 6 and possible subclinical seizures  Chief Complaint:  Seizure-like activity/subdural hematoma    HPI/24hr events:  Intermittent episodes of tachycardia  Minimally if at all responsive  Physical Exam:   General - cachectic /elderly   Not responsive  HEENT - pupils 2 and reactive  Will not track  No obvious deviation to left or right  Trachea midline  Cardiac - significant systolic heart murmur  Likely sinus arrhythmias as is regular  Pulmonary - course likely transmitted upper respiratory sounds  Appears to be gargling on secretions  Abdomen - nontender x4 quadrants  Large midline incision appears healed  Patient does not wince or withdrawal with deep palpation of abdomen  Dermatology - extensive number of lower extremity ulcers  These were present on past admission  There was a wound care consult placed  Neurologic - GCS 6   Clonus right lower extremity  Withdrawals to pain x3 does not withdrawal left upper extremity  Pupils midline    Vitals:    11/15/17 0107 11/15/17 0207 11/15/17 0307 11/15/17 0351   BP: 158/92 (!) 163/103 (!) 186/81 164/70   Pulse: 80 92 90 86   Resp: (!) 28 (!) 28 (!) 33 (!) 25   Temp:  98 6 °F (37 °C)  98 2 °F (36 8 °C)   TempSrc:    Oral   SpO2: 99% 94% 96% 94%   Weight:       Height:                 Temperature:   Temp (24hrs), Av 4 °F (36 9 °C), Min:97 9 °F (36 6 °C), Max:98 8 °F (37 1 °C)    Current: Temperature: 98 2 °F (36 8 °C)    Weights:   IBW: 50 1 kg    Body mass index is 27 3 kg/m²  Weight (last 2 days)     Date/Time   Weight    177  67 7 (149 25)    17 2016  70 3 (155)              Hemodynamic Monitoring:  N/A     Non-Invasive/Invasive Ventilation Settings:  Respiratory    Lab Data (Last 4 hours)    None         O2/Vent Data (Last 4 hours)    None              No results found for: PHART, UVA2GSP, PO2ART, IET6ATR, I7SFZQFZ, BEART, SOURCE  SpO2: SpO2: 94 %    Intake and Outputs:  I/O     None        UOP: 1100 mL    Nutrition:        Diet Orders            Start     Ordered    17  Diet NPO  Diet effective now     Question Answer Comment   Diet Type NPO    RD to adjust diet per protocol?  Yes        17        TF currently running at [address]:     Results from last 7 days  Lab Units 11/15/17  0534 17  0028 17  1952   WBC Thousand/uL 28 10*  --  22 06* 15 14* 13 82*   HEMOGLOBIN g/dL 10 0*  --  10 5* 9 8* 9 3*   I STAT HEMOGLOBIN g/dl  --  11 6  11 2*  --   --   --    HEMATOCRIT % 30 2*  --  32 0* 29 7* 28 9*   PLATELETS Thousands/uL 515*  --  592* 445* 460*   NEUTROS PCT %  --   --  61  --  83*   MONOS PCT %  --   --  8  --  6        Results from last 7 days  Lab Units 11/15/17  0534 11/14/17 2022 11/14/17 2021 11/13/17  0829   SODIUM mmol/L 135*  --  133* 137   POTASSIUM mmol/L 3 6  --  3 6 3 2*   CHLORIDE mmol/L 99*  --  97* 100   CO2 mmol/L 28  --  30 28   BUN mg/dL 24  --  28* 35*   CREATININE mg/dL 1 25  --  1 51* 1 41*   CALCIUM mg/dL 8 4  --  8 8 8 3   TOTAL PROTEIN g/dL  --   --  7 5  --    BILIRUBIN TOTAL mg/dL  --   --  0 26  --    ALK PHOS U/L  --   --  95  --    ALT U/L  --   --  11*  --    AST U/L  --   --  16  --    GLUCOSE RANDOM mg/dL 219*  --  312* 161*   GLUCOSE, ISTAT mg/dl  --  333*  337*  --   --                 Results from last 7 days  Lab Units 11/12/17 1952   INR  1 00   PTT seconds 26       Results from last 7 days  Lab Units 11/15/17  0534   LACTIC ACID mmol/L 2 3*       0  Lab Value Date/Time   TROPONINI 0 22 (H) 05/08/2017 0525   TROPONINI 0 27 (H) 05/08/2017 0229   TROPONINI 11 70 (H) 05/03/2017 0840   TROPONINI 12 68 (H) 05/03/2017 0111   TROPONINI 12 75 (H) 05/02/2017 2142   TROPONINI 9 83 (H) 05/02/2017 1718       Imaging:   CTH: Decreased size of the known tiny anterior subfalcine subdural hematoma      No new findings  EKG: No new    Micro:  Lab Results   Component Value Date    BLOODCX No Growth at 24 hrs  11/12/2017    BLOODCX No Growth at 24 hrs  11/12/2017    BLOODCX No Growth After 5 Days   05/08/2017    URINECX No Growth <1000 cfu/mL 05/08/2017    URINECX 10,000-19,000 cfu/ml Escherichia coli 05/03/2017    URINECX 30,000-39,000 cfu/ml Mixed Contaminants X3 05/03/2017       Allergies: No Known Allergies    Medications:   Scheduled Meds:    chlorhexidine 15 mL Swish & Spit Q12H [facility]   insulin lispro 1-5 Units Subcutaneous Q6H [facility]   insulin regular      levETIRAcetam 500 mg Intravenous Q12H [facility]   multi-electrolyte 1,000 mL Intravenous Once   potassium chloride 20 mEq Intravenous Once     Continuous Infusions:    multi-electrolyte 100 mL/hr Last Rate: 100 mL/hr (11/15/17 0201)     PRN Meds:    fentanyl citrate (PF) 25 mcg Q3H PRN   metoprolol 5 mg Q6H PRN       VTE Pharmacologic Prophylaxis: Sequential compression device (Venodyne)   VTE Mechanical Prophylaxis: sequential compression device    Invasive lines and devices: Invasive Devices     Peripheral Intravenous Line            Peripheral IV 11/14/17 Left Antecubital less than 1 day          Drain            Urethral Catheter Straight-tip 16 Fr  less than 1 day                 Code Status: Level 3 - DNAR and DNI     Portions of the record may have been created with voice recognition software  Occasional wrong word or "sound a like" substitutions may have occurred due to the inherent limitations of voice recognition software  Read the chart carefully and recognize, using context, where substitutions have occurred       Mary Alice Boone,

## 2017-11-15 NOTE — RESPIRATORY THERAPY NOTE
RT Protocol Note  Radha Nelson 80 y o  female MRN: [id number]  Unit/Bed#: ICU 11 Encounter: [id number]    Assessment    Active Problems:    * No active hospital problems  *      Home Pulmonary Medications:  n/a    Past Medical History:   Diagnosis Date    [facility]     Chronic pain     Dementia     Diabetes mellitus ([address] )     Hypertension      Social History     Social History    Marital status:      Spouse name: N/A    Number of children: N/A    Years of education: N/A     Social History Main Topics    Smoking status: Former Smoker     Quit date: 1947    Smokeless tobacco: Never Used      Comment: patient no longer a smoker    Alcohol use Yes      Comment: occassionaly wine drinker    Drug use: No    Sexual activity: Not Asked     Other Topics Concern    None     Social History Narrative    None       Subjective         Objective    Physical Exam:   Assessment Type: Assess only  General Appearance: Unresponsive  Respiratory Pattern: Labored  Chest Assessment: Chest expansion symmetrical  Bilateral Breath Sounds: Coarse, Diminished    Vitals:  Blood pressure 147/60, pulse 76, temperature 98 8 °F (37 1 °C), temperature source Tympanic, resp  rate 20, weight 70 3 kg (155 lb), SpO2 100 %  Imaging and other studies: I have personally reviewed pertinent reports  Plan    Respiratory Plan: Discontinue Protocol        Resp Comments: Pt has no pulmonary hx and  takes no meds at home Protocol d'cd

## 2017-11-15 NOTE — PROGRESS NOTES
Progress Note/Acceptance note - Critical Care   Paolo Mcgovern 80 y o  female MRN: [id number]  Unit/Bed#: ICU 11 Encounter: [id number]    Assessment:  Patient discharged home here today for subfalcine subdural hematoma  Discharged to [facility]  Son visit today noticed whole body twitching per him  Son Marlon Turcios is at bedside no other siblings he has POA  States patient then stopped responding called 911  Arrives here was a GCS of 5 in the emergency department GCS 6 in the ICU  Withdrawals to pain all extremities except for left upper  Will not open eyes will not speak  Apparently was GCS 14 when discharged  CT head shows stable to slightly decreased size of subdural hematoma  Physical exam extensive wounds lower extremities had these before seen by wound care  In-depth conversation with son in ICU waiting room  States mother never would have wanted mechanical ventilation or CPR performed  However he would like to see if seizures or treatable at this point in time  Discussed due to mental status patient high risk for aspiration and other causes of cardiovascular/respiratory decline  He understands this  He states he would also like to speak with hospice/palliative care regarding end of life care  Medical history:  Congestive heart failure/dementia/diabetes/hypertension    Plan:                Neuro:  GCS 6  Withdrawals extremities except left upper  No attempted speech or eye opening  Had tonic-clonic activity per son  - Received loading dose of Keppra in emergency department  Will place on 500 b i d   - Patient on nasal cannula respiratory status is tenuous  An patient is a DNI  Will attempt to avoid benzodiazepines to avoid decreasing respiratory rate further  - Patient level 3 DNR/DNI  Palliative care consult  - No analgesia/Sedation at this point in time  - neurology consult for seizure activity    - 24 hour EEG - spoke with epileptologist                 CV:  Echocardiogram 2017 grade 2 diastolic dysfunction  Ejection fraction 60% however significant hypokinesis  - Will check lactic not for volume/resuscitation status but rather for evidence of ongoing seizure activity  - Point care troponin unremarkable                 Lung:  Hypoxia intermittently on nasal cannula  Can escalate to BiPAP the patient is DNI  Due to GCS of 6 not able to be compliant with Respiratory therapy such as incentive spirometry  - VBG did show pH is 7 23  Increasing CO2 to 76  GI:  NPO Given mental status  No clear indication for GI prophylaxis                 FEN:  A slight at 100 mL/hour  Increasing creatinine will give 1 L IV fluid bolus  :  Urinating spontaneously  Has leukocytosis  May be stress related however will check urinalysis                 ID:  Does have significant leukocytosis of 22,000  Will check urinalysis and chest x-ray  Apparently seizure activity happened while eating dinner  Therefore very high risk for aspiration  Heme:  Will hold off on chemical DVT prophylaxis given subdural will await Neurosurgery recommendations   Cannot do mechanical DVT prophylaxis due to extensive leg ulcers  Endo:  Diabetes at baseline  Will hold PTA meds given the patient is NPO  SubQ insulin                            Msk/Skin:  Patient unable to get out of bed  Extensive lower extremity wounds  Wound plan is already in chart from November 13th  See wound care consult  - palliative Care consult                 Disposition:  ICU level of care for GCS of 6 and likely subclinical seizures  Chief Complaint:  Seizure-like activity/subdural hematoma    HPI/24hr events:  Eating dinner tonic-clonic seizure activity  Arrives ICU altered mental status GCS of 6  Continuous EEG along with Keppra load and Keppra b i d  Physical Exam:   General - cachectic /elderly   Not responsive   HEENT - pupils 2 and reactive  Will not track    No obvious deviation to left or right  Trachea midline  Cardiac - significant systolic heart murmur  Likely sinus arrhythmias as is regular  Pulmonary - clear to auscultation  Abdomen - nontender x4 quadrants  Large midline incision appears healed  Dermatology - extensive number of lower extremity ulcers  These were present on past admission  There was a wound care consult placed  Neurologic - GCS 6   Clonus right lower extremity  Withdrawals to pain x3 does not withdrawal left upper extremity  Pupils midline    Vitals:    17 2130 17 2200 17 2300 17 230   BP: 117/53 147/60  162/72   Pulse: 82 76  82   Resp: (!) 23 21 20 (!) 23   Temp:    98 6 °F (37 °C)   TempSrc:    Axillary   SpO2: 100% 100%  96%   Weight:    67 7 kg (149 lb 4 oz)   Height:    5' 2" (1 575 m)             Temperature:   Temp (24hrs), Av 4 °F (36 9 °C), Min:97 9 °F (36 6 °C), Max:98 8 °F (37 1 °C)    Current: Temperature: 98 6 °F (37 °C)    Weights:   IBW: 50 1 kg    Body mass index is 27 3 kg/m²  Weight (last 2 days)     Date/Time   Weight    17  67 7 (149 25)    17  70 3 (155)              Hemodynamic Monitoring:  N/A     Non-Invasive/Invasive Ventilation Settings:  Respiratory    Lab Data (Last 4 hours)    None         O2/Vent Data (Last 4 hours)    None              No results found for: PHART, QDA2ZFB, PO2ART, PMQ9LTG, B9BGKUMJ, BEART, SOURCE  SpO2: SpO2: 96 %    Intake and Outputs:  I/O     None        UOP: Just arrived    Nutrition:        Diet Orders            Start     Ordered    17  Diet NPO  Diet effective now     Question Answer Comment   Diet Type NPO    RD to adjust diet per protocol?  Yes        17        TF currently running at [address]:     Results from last 7 days  Lab Units 17  0028 17   WBC Thousand/uL  --  22 06* 15 14* 13 82*   HEMOGLOBIN g/dL  --  10 5* 9 8* 9 3*   I STAT HEMOGLOBIN g/dl 11 6  11 2*  -- --   --    HEMATOCRIT %  --  32 0* 29 7* 28 9*   PLATELETS Thousands/uL  --  592* 445* 460*   NEUTROS PCT %  --  61  --  83*   MONOS PCT %  --  8  --  6        Results from last 7 days  Lab Units 11/14/17 2022 11/14/17 2021 11/13/17  0829 11/13/17  0028   SODIUM mmol/L  --  133* 137 135*   POTASSIUM mmol/L  --  3 6 3 2* 3 1*   CHLORIDE mmol/L  --  97* 100 98*   CO2 mmol/L  --  30 28 30   BUN mg/dL  --  28* 35* 35*   CREATININE mg/dL  --  1 51* 1 41* 1 47*   CALCIUM mg/dL  --  8 8 8 3 8 6   TOTAL PROTEIN g/dL  --  7 5  --   --    BILIRUBIN TOTAL mg/dL  --  0 26  --   --    ALK PHOS U/L  --  95  --   --    ALT U/L  --  11*  --   --    AST U/L  --  16  --   --    GLUCOSE RANDOM mg/dL  --  312* 161* 204*   GLUCOSE, ISTAT mg/dl 333*  337*  --   --   --                 Results from last 7 days  Lab Units 11/12/17 1952   INR  1 00   PTT seconds 26           0  Lab Value Date/Time   TROPONINI 0 22 (H) 05/08/2017 0525   TROPONINI 0 27 (H) 05/08/2017 0229   TROPONINI 11 70 (H) 05/03/2017 0840   TROPONINI 12 68 (H) 05/03/2017 0111   TROPONINI 12 75 (H) 05/02/2017 2142   TROPONINI 9 83 (H) 05/02/2017 1718       Imaging:   CTH: Decreased size of the known tiny anterior subfalcine subdural hematoma      No new findings  EKG: No new    Micro:  Lab Results   Component Value Date    BLOODCX No Growth at 24 hrs  11/12/2017    BLOODCX No Growth at 24 hrs  11/12/2017    BLOODCX No Growth After 5 Days   05/08/2017    URINECX No Growth <1000 cfu/mL 05/08/2017    URINECX 10,000-19,000 cfu/ml Escherichia coli 05/03/2017    URINECX 30,000-39,000 cfu/ml Mixed Contaminants X3 05/03/2017       Allergies: No Known Allergies    Medications:   Scheduled Meds:    chlorhexidine 15 mL Swish & Spit Q12H [address]   insulin lispro 1-5 Units Subcutaneous Q6H [address]   levETIRAcetam 500 mg Intravenous Q12H [address]   multi-electrolyte 1,000 mL Intravenous Once     Continuous Infusions:    multi-electrolyte 100 mL/hr Last Rate: 100 mL/hr (11/15/17 0002)     PRN Meds:       VTE Pharmacologic Prophylaxis: Sequential compression device (Venodyne)   VTE Mechanical Prophylaxis: sequential compression device    Invasive lines and devices: Invasive Devices     Peripheral Intravenous Line            Peripheral IV 11/14/17 Left Antecubital less than 1 day                 Code Status: Level 3 - DNAR and DNI     Portions of the record may have been created with voice recognition software  Occasional wrong word or "sound a like" substitutions may have occurred due to the inherent limitations of voice recognition software  Read the chart carefully and recognize, using context, where substitutions have occurred       Aydee Linda DO

## 2017-11-15 NOTE — PROGRESS NOTES
11/15/17 56 [facility] Involvement Patient active with [employer]   [employer] Leader Aware/Contacted Leader/[employer] aware of patient's status   Spiritual Beliefs/Perceptions   Concept of God Accepting   God's Role in Disease Natural   Psychosocial   Patient Behaviors/Mood Unable to assess   Length of Time/Family Visitation 31 min -1hr   Coping Responses   Family Coping Open/discussion; Sadness   Plan of Care   Care Plan Initiated Yes   Comments Son bringing Jerzy lopez for SOS   Assessment Completed by: Unit visit

## 2017-11-15 NOTE — ED NOTES
Dr Chao Hernandez at pt's bedside for evaluation at this time  Per Dr Chao Hernandez, NO stroke alert needed to be called at this time        Andrzej Vargas RN  11/14/17 9137

## 2017-11-15 NOTE — CONSULTS
Consultation - Neurology   Kely Nance 80 y o  female MRN: [id number]  Unit/Bed#: ICU 11 Encounter: [id number]      Assessment/Plan   Assessment/Plan:  3 80year old female recently discharged from the hospital to SNF for rehab following admission for SDH re-admitted for seizure-activity   - Recommend continue to monitor on vEEG  0 5 mg Ativan to be given now for seizure activity     - Recommend Keppra 1500 mg load to be given followed by 750 mg Q12H for maintenance  - Seizure precautions  - Patient's son to discuss care with hospice team  Further plan of care pending these conversations  Will continue to monitor patient closely on vEEG and treat appropriately  Patient's son is aware that medications to treat seizures may be sedating and affect respiratory drive  2  Atrial fibrillation   - Unclear if this is new onset, not previously documented in prior cardiology notes  Possible that patient may have had embolic stroke which would place her at risk for seizure  - Hold AC/AP at this point given SDH     - Consider MRI brain without contrast to evaluate for embolic strokes  - Rate control as per primary service  3  DM   - Insulin as per primary team      4  HTN    - Blood pressure control as per primary team  Recommend SBP goal <160  History of Present Illness     Reason for Consult / Principal Problem: Seizure  Hx and PE limited by: Patient mental status  HPI: Kely Nance is a 80 y o   female who presents with seizure activity  She was noted to be hospitalized until yesterday afternoon for SDH and was transferred to SNF for rehab  At the SNF, she was noted to have repeated jerking movements of LUE and possible L gaze  EMS was called and patient was given 4 mg Ativan which ended the seizure-like movements  She was noted to be GCS 3 on arrival to the hospital and per son patient was noted to be DNR/DNI  She was placed on vEEG and is noted to have seizure-activity this morning   Per bedside RN, patient has been non-responsive this morning and not opening eyes  She will respond to noxious stimuli  She is also noted to be in atrial fibrillation  Limited history secondary to patient's mental status  Per chart review, patient's son is interested in discussing plan of care with hospice team      Inpatient consult to Neurology  Consult performed by: Francisco J Del Valle ordered by: Perry Perish          Review of Systems   Unable to perform ROS: Acuity of condition       Historical Information   Past Medical History:   Diagnosis Date    [facility] [address] [facility]     Chronic pain     Dementia     Diabetes mellitus [facility]     Hypertension      Past Surgical History:   Procedure Laterality Date    HYSTERECTOMY       Social History   History   Alcohol Use    Yes     Comment: occassionaly wine drinker     History   Drug Use No     History   Smoking Status    Former Smoker    Quit date: 1947   Smokeless Tobacco    Never Used     Comment: patient no longer a smoker     Family History:   Family History   Problem Relation Age of Onset    Family history unknown: Yes       Review of previous medical records was completed  Patient was admitted to the hospital on 11/12 with multiple falls  She was noted to have a small falcine SDH and was transferred [facility] for neurosurgery evaluation  She was discharged to SNF for rehab on the afternoon of 11/14       Meds/Allergies   Scheduled Meds:  chlorhexidine 15 mL Swish & Spit Q12H [address]   heparin (porcine) 5,000 Units Subcutaneous Q8H [address]   insulin lispro 2-12 Units Subcutaneous TID AC   insulin regular      levETIRAcetam 1,000 mg Intravenous Once   levETIRAcetam 750 mg Intravenous Q12H [address]   LORazepam 0 5 mg Intravenous Once   piperacillin-tazobactam 2 25 g Intravenous Q6H   vancomycin 17 5 mg/kg Intravenous Q24H     Continuous Infusions:  multi-electrolyte 100 mL/hr Last Rate: Stopped (11/15/17 0931)     PRN Meds:   acetaminophen    fentanyl citrate (PF)    hydrALAZINE    metoprolol      No Known Allergies    Objective   Vitals:Blood pressure 140/70, pulse 82, temperature (!) 101 7 °F (38 7 °C), temperature source Rectal, resp  rate (!) 24, height 5' 2" (1 575 m), weight 67 7 kg (149 lb 4 oz), SpO2 100 %  ,Body mass index is 27 3 kg/m²  Intake/Output Summary (Last 24 hours) at 11/15/17 1114  Last data filed at 11/15/17 0801   Gross per 24 hour   Intake             1755 ml   Output             1200 ml   Net              555 ml       Invasive Devices: Invasive Devices     Peripheral Intravenous Line            Peripheral IV 11/14/17 Left Antecubital less than 1 day          Drain            Urethral Catheter Straight-tip 16 Fr  less than 1 day                Physical Exam   Constitutional: She appears well-developed and well-nourished  No distress  HENT:   Head: Normocephalic and atraumatic  Eyes:   R pupil 3 mm and reactive, L pupil 3 mm and irregular    Neck: Normal range of motion  Cardiovascular:   Irregularly irregular   Pulmonary/Chest: No respiratory distress  Tachypnea, course breath sounds B/L     Abdominal: Soft  She exhibits no distension  Musculoskeletal: She exhibits no edema  Neurological:   Opens eyes to noxious stimuli  Unable to follow commands  Unable to state name but does say "ouch" when noxious stimuli applied  Skin: She is not diaphoretic  Multiple sores noted to B/L toes, feet currently wrapped with gauze dressing  Neurologic Exam     Mental Status   Attention: decreased  Concentration: decreased  Level of consciousness: responsive to painful stimuli  Limited exam secondary to mental status      Cranial Nerves     CN III, IV, VI   Right pupil: Size: 3 mm  Shape: regular  Left pupil: Size: 3 mm  Shape: irregular  Conjugate gaze: present  Limited exam secondary to mental status        Motor Exam   Muscle bulk: normal  Overall muscle tone: normal  Right arm tone: normal  Left arm tone: normal  Right leg tone: normal  Left leg tone: normalFlexion withdrawal noted to noxious stimuli x 4 extremities  Sensory Exam   Unable to reliably assess secondary to mental status  Gait, Coordination, and Reflexes RUE noted to have twitching throughout exam  Limited exam secondary to mental status  Did not assess plantars secondary to B/L foot wounds          Lab Results:   Recent Results (from the past 24 hour(s))   POCT troponin    Collection Time: 11/14/17  8:19 PM   Result Value Ref Range    POC Troponin I 0 01 0 00 - 0 08 ng/ml    Specimen Type VENOUS    Comprehensive metabolic panel    Collection Time: 11/14/17  8:21 PM   Result Value Ref Range    Sodium 133 (L) 136 - 145 mmol/L    Potassium 3 6 3 5 - 5 3 mmol/L    Chloride 97 (L) 100 - 108 mmol/L    CO2 30 21 - 32 mmol/L    Anion Gap 6 4 - 13 mmol/L    BUN 28 (H) 5 - 25 mg/dL    Creatinine 1 51 (H) 0 60 - 1 30 mg/dL    Glucose 312 (H) 65 - 140 mg/dL    Calcium 8 8 8 3 - 10 1 mg/dL    AST 16 5 - 45 U/L    ALT 11 (L) 12 - 78 U/L    Alkaline Phosphatase 95 46 - 116 U/L    Total Protein 7 5 6 4 - 8 2 g/dL    Albumin 2 6 (L) 3 5 - 5 0 g/dL    Total Bilirubin 0 26 0 20 - 1 00 mg/dL    eGFR 29 ml/min/1 73sq m   CBC and differential    Collection Time: 11/14/17  8:21 PM   Result Value Ref Range    WBC 22 06 (H) 4 31 - 10 16 Thousand/uL    RBC 3 98 3 81 - 5 12 Million/uL    Hemoglobin 10 5 (L) 11 5 - 15 4 g/dL    Hematocrit 32 0 (L) 34 8 - 46 1 %    MCV 80 (L) 82 - 98 fL    MCH 26 4 (L) 26 8 - 34 3 pg    MCHC 32 8 31 4 - 37 4 g/dL    RDW 16 4 (H) 11 6 - 15 1 %    MPV 10 2 8 9 - 12 7 fL    Platelets 981 (H) 158 - 390 Thousands/uL    nRBC 0 /100 WBCs    Neutrophils Relative 61 43 - 75 %    Lymphocytes Relative 28 14 - 44 %    Monocytes Relative 8 4 - 12 %    Eosinophils Relative 3 0 - 6 %    Basophils Relative 0 0 - 1 %    Neutrophils Absolute 13 33 (H) 1 85 - 7 62 Thousands/µL    Lymphocytes Absolute 6 16 (H) 0 60 - 4 47 Thousands/µL    Monocytes Absolute 1 75 (H) 0 17 - 1 22 Thousand/µL    Eosinophils Absolute 0 58 0 00 - 0 61 Thousand/µL    Basophils Absolute 0 06 0 00 - 0 10 Thousands/µL   POCT Blood Gas (CG8+)    Collection Time: 11/14/17  8:22 PM   Result Value Ref Range    ph, Alvarez ISTAT 7 239 (LL) 7 300 - 7 400    pCO2, Alvarez i-STAT 76 6 (HH) 42 0 - 50 0 mm HG    pO2, Alvarez i-STAT 102 0 (H) 35 0 - 45 0 mm HG    BE, i-STAT 3 -2 - 3 mmol/L    HCO3, Alvarez i-STAT 32 7 (H) 24 0 - 30 0 mmol/L    CO2, i-STAT 35 (H) 21 - 32 mmol/L    O2 Sat, i-STAT 96 95 - 98 %    SODIUM, I-STAT 135 (L) 136 - 145 mmol/l    Potassium, i-STAT 3 5 3 5 - 5 3 mmol/L    Calcium, Ionized i-STAT 1 19 1 12 - 1 32 mmol/L    Hct, i-STAT 33 (L) 34 8 - 46 1 %    Hgb, i-STAT 11 2 (L) 11 5 - 15 4 g/dl    Glucose, i-STAT 337 (H) 65 - 140 mg/dl    Specimen Type VENOUS    POCT Chem 8+    Collection Time: 11/14/17  8:22 PM   Result Value Ref Range    SODIUM, I-STAT 135 (L) 136 - 145 mmol/l    Potassium, i-STAT 3 5 3 5 - 5 3 mmol/L    Chloride, istat 95 (L) 100 - 108 mmol/L    CO2, i-STAT 31 21 - 32 mmol/L    Anion Gap, Istat 14 (H) 4 - 13 mmol/L    Calcium, Ionized i-STAT 1 18 1 12 - 1 32 mmol/L    BUN, I-STAT 32 (H) 5 - 25 mg/dl    Creatinine, i-STAT 1 3 0 6 - 1 3 mg/dl    eGFR 35 ml/min/1 73sq m    Glucose, i-STAT 333 (H) 65 - 140 mg/dl    Hct, i-STAT 34 (L) 34 8 - 46 1 %    Hgb, i-STAT 11 6 11 5 - 15 4 g/dl    Specimen Type VENOUS    Lactic acid, plasma    Collection Time: 11/15/17 12:48 AM   Result Value Ref Range    LACTIC ACID 1 7 0 5 - 2 0 mmol/L   Blood gas, venous    Collection Time: 11/15/17 12:48 AM   Result Value Ref Range    pH, Alvarez 7 315 7 300 - 7 400    pCO2, Alvarez 60 2 (H) 42 0 - 50 0 mm Hg    pO2, Alvarez 38 9 35 0 - 45 0 mm Hg    HCO3, Alvarez 30 0 24 - 30 mmol/L    Base Excess, Alvarez 2 6 mmol/L    O2 Content, Alvarez 10 6 ml/dL    O2 HGB, VENOUS 66 5 60 0 - 80 0 %   UA (URINE) with reflex to Microscopic    Collection Time: 11/15/17  2:09 AM   Result Value Ref Range    Color, UA Yellow     Clarity, UA Clear     Specific [place], UA 1 012 1 003 - 1 030    pH, UA 5 5 4 5 - 8 0    Leukocytes, UA Negative Negative    Nitrite, UA Negative Negative    Protein, UA Negative Negative mg/dl    Glucose,  (1/2%) (A) Negative mg/dl    Ketones, UA Negative Negative mg/dl    Urobilinogen, UA 0 2 0 2, 1 0 E U /dl E U /dl    Bilirubin, UA Negative Negative    Blood, UA Negative Negative   Fingerstick Glucose (POCT)    Collection Time: 11/15/17  2:31 AM   Result Value Ref Range    POC Glucose 279 (H) 65 - 140 mg/dl   CBC with differential    Collection Time: 11/15/17  5:34 AM   Result Value Ref Range    WBC 28 10 (H) 4 31 - 10 16 Thousand/uL    RBC 3 79 (L) 3 81 - 5 12 Million/uL    Hemoglobin 10 0 (L) 11 5 - 15 4 g/dL    Hematocrit 30 2 (L) 34 8 - 46 1 %    MCV 80 (L) 82 - 98 fL    MCH 26 4 (L) 26 8 - 34 3 pg    MCHC 33 1 31 4 - 37 4 g/dL    RDW 16 2 (H) 11 6 - 15 1 %    MPV 10 0 8 9 - 12 7 fL    Platelets 438 (H) 214 - 390 Thousands/uL    nRBC 0 /100 WBCs   Basic metabolic panel    Collection Time: 11/15/17  5:34 AM   Result Value Ref Range    Sodium 135 (L) 136 - 145 mmol/L    Potassium 3 6 3 5 - 5 3 mmol/L    Chloride 99 (L) 100 - 108 mmol/L    CO2 28 21 - 32 mmol/L    Anion Gap 8 4 - 13 mmol/L    BUN 24 5 - 25 mg/dL    Creatinine 1 25 0 60 - 1 30 mg/dL    Glucose 219 (H) 65 - 140 mg/dL    Calcium 8 4 8 3 - 10 1 mg/dL    eGFR 36 ml/min/1 73sq m   Lactic acid    Collection Time: 11/15/17  5:34 AM   Result Value Ref Range    LACTIC ACID 2 3 (HH) 0 5 - 2 0 mmol/L   Manual Differential(PHLEBS Do Not Order)    Collection Time: 11/15/17  5:34 AM   Result Value Ref Range    Segmented % 91 (H) 43 - 75 %    Lymphocytes % 4 (L) 14 - 44 %    Monocytes % 4 4 - 12 %    Eosinophils % 0 0 - 6 %    Basophils % 0 0 - 1 %    Myelocytes % 1 0 - 1 %    Absolute Neutrophils 25 57 (H) 1 85 - 7 62 Thousand/uL    Lymphocytes Absolute 1 12 0 60 - 4 47 Thousand/uL    Monocytes Absolute 1 12 0 00 - 1 22 Thousand/uL    Eosinophils Absolute 0 00 0 00 - 0 40 Thousand/uL Basophils Absolute 0 00 0 00 - 0 10 Thousand/uL    Total Counted      RBC Morphology Present     Anisocytosis Present     Ovalocytes Present     Poikilocytes Present     Platelet Estimate Increased (A) Adequate   Fingerstick Glucose (POCT)    Collection Time: 11/15/17  5:43 AM   Result Value Ref Range    POC Glucose 277 (H) 65 - 140 mg/dl       Imaging Studies: I have personally reviewed pertinent reports  and I have personally reviewed pertinent films in PACS  CTH- Decreased size of the known tiny anterior subfalcine SDH      VTE Prophylaxis: Reason for no pharmacologic prophylaxis SDH

## 2017-11-15 NOTE — PLAN OF CARE
Nutrient/Hydration intake appropriate for improving, restoring or maintaining nutritional needs Not Progressing      Skin integrity is maintained or improved Not Progressing

## 2017-11-15 NOTE — CASE MANAGEMENT
Notification of Inpatient Admission/Inpatient Authorization Request  This is a Notification of Inpatient Admission/Request for Inpatient Authorization to our facility [facility]  Please be advised that this patient is currently in our facility under Inpatient Status  Below you will find the Attending Physician and Facilitys information including NPI# and contact information for the Utilization  assigned to the [facility] where the patient is receiving services  Please feel free to contact the Utilization Review Department with any questions  Patient Information:  PATIENT NAME: Kelly Don  MRN: [id number]  YOB: 1920    PRESENTATION DATE: 11/14/2017  8:12 PM  IP ADMISSION DATE: 11/14/17 2112  DISCHARGE DATE: No discharge date for patient encounter  DISPOSITION: [facility]    Attending Physician:  ENRICO Renteria  Specialty- General Surgery  [facility] ID- [id number]  [address] [place] [address]  Phone 1: [phone]  Fax: [phone]    Facility:  [address] [phone]  NPI: [id number]  TAX ID# [id number]  MEDICARE ID: [id number]    [facility] in the [facility] by Eric Perry for 2017  Network Utilization Review Department  Phone: [phone]; Fax [phone]  ATTENTION: The Network Utilization Review Department is now centralized for our 7 Facilities  Make a note that we have a new phone and fax numbers for our Department  Please call with any questions or concerns to [phone] and carefully follow the prompts so that you are directed to the right person  All voicemails are confidential  Fax any determinations, approvals, denials, and requests for initial or continue stay review clinical to [phone]   Due to HIGH CALL volume, it would be easier if you could please send faxed requests to expedite your requests and in part, help us provide discharge notifications faster

## 2017-11-15 NOTE — PROGRESS NOTES
Critical Care Interval Progress Note    Called to bedside b/c patient desaturated into the low 60s  She has been noted to be having non-convulsive status on EMU throughout the day and remains a GCS of 5 to 6  She has been loaded with Keppra and Dilantin today  Respirations remain in the mid 20s with oxygen saturations in the 60s  She responded to placement of NRB mask which was transitioned to HFNC with FiO2 of 85% and 45 liters  Repeat discussions were held with the family and palliative care  Her son reconfirms DNR/DNI status and will likely make her comfort care in the morning, yet needs the evening to think about everything  CXR ordered and pending  She is not a BiPAP candidate at this time  Holding off on checking ABG at this time, as she is not a BiPAP candidate given her mental status  Lactic acid normal, blood cultures pending  Will continue IV abx started this morning, but will not further escalate care at this time       Total Critical Care Time 20 minutes

## 2017-11-15 NOTE — H&P
H&P Exam - Trauma   Parth Baumann 80 y o  female MRN: [id number]  Unit/Bed#: ICU 11 Encounter: [id number]    Assessment/Plan   Trauma Alert: Evaluation  Model of Arrival: Ambulance  Trauma Team: Attending Abdiel Holloway and Residents Olga Cardoza  Consultants: Neurology- consult placed  Pallative care- consult placed     Trauma Active Problems:   1  Seizure  2  Altered Mental Status   3  SDH    Trauma Plan:   1  Admit to trauma- ICU  2  Neurology consult/ neurochecks  3  EEG/ Keppra BID  4  Diet NPO  5  Pallitive consult to discuss goals of care  6  DVT ppx- SCDs  7  Code Status- DNR/DNI    Chief Complaint: Seizure     History of Present Illness   HPI:  Parth Baumann is a 80 y o  female who presents from [address] [place] [address] after a witnessed seizure  Patient was admitted to [facility] 2 days ago after a fall for a subfalcine subdural hematoma  She was discharged today to [address] [place] [address]  While there, she was reported to whole body twitching, L sided gaze, and then went unresponsive  She was given Ativan by EMS and seizure activity stopped  Upon arrival to the ED, patient has continued to be unresponsive and only withdrawals to pain  Son is at bedside and states that patient is DNR/DNI  She had a repeat CTH, which showed decreased size pf previous bleed  No known new trauma  Mechanism:Fall 2 days ago     Review of Systems   Unable to perform ROS: Patient unresponsive   Neurological: Positive for seizures  Historical Information   History is unobtainable from the patient due to unresponsive    Efforts to obtain history included the following sources: family member, other medical personnel, obtained from other records    Past Medical History:   Diagnosis Date    [facility]est[facility] failure) [facility]     Chronic pain     Dementia     Diabetes mellitus ([facility] [facility]     Hypertension      Past Surgical History:   Procedure Laterality Date    HYSTERECTOMY       Social History   History   Alcohol Use    Yes     Comment: occassionaly wine drinker History   Drug Use No     History   Smoking Status    Former Smoker    Quit date: 1947   Smokeless Tobacco    Never Used     Comment: patient no longer a smoker     There is no immunization history for the selected administration types on file for this patient  Family History: Non-contributory  Unable to obtain/limited by unresponsive       Meds/Allergies   all current active meds have been reviewed, current meds:   Current Facility-Administered Medications   Medication Dose Route Frequency    chlorhexidine (PERIDEX) 0 12 % oral rinse 15 mL  15 mL Swish & Spit Q12H [address]    [START ON 11/15/2017] levETIRAcetam (KEPPRA) 500 mg in sodium chloride 0 9 % 100 mL IVPB  500 mg Intravenous Q12H [address]    multi-electrolyte (ISOLYTE-S PH 7 4 equivalent) IV solution  100 mL/hr Intravenous Continuous    multi-electrolyte (ISOLYTE-S PH 7 4) bolus 1,000 mL  1,000 mL Intravenous Once    and PTA meds:   Prior to Admission Medications   Prescriptions Last Dose Informant Patient Reported?  Taking?   acetaminophen (TYLENOL) 325 mg tablet   No No   Sig: Take 1 tablet by mouth every 6 (six) hours as needed for mild pain   donepezil (ARICEPT) 5 mg tablet   Yes No   Sig: Take 5 mg by mouth daily at bedtime   fentaNYL (DURAGESIC) 25 mcg/hr   Yes No   Sig: Place 1 patch on the skin every third day   furosemide (LASIX) 40 mg tablet   No No   Sig: Take 1 tablet by mouth daily for 30 days   glimepiride (AMARYL) 4 mg tablet   Yes No   Sig: Take 4 mg by mouth 2 (two) times a day     metolazone (ZAROXOLYN) 2 5 mg tablet   Yes No   Sig: Take 2 5 mg by mouth daily   metoprolol succinate (TOPROL-XL) 50 mg 24 hr tablet   Yes No   Sig: Take 50 mg by mouth daily   potassium chloride (MICRO-K) 10 MEQ CR capsule   Yes No   Sig: Take 10 mEq by mouth daily      Facility-Administered Medications: None       No Known Allergies      PHYSICAL EXAM    Objective   Vitals:   First set: Temperature: 98 8 °F (37 1 °C) (11/14/17 2017)  Pulse: 100 (11/14/17 2016)  Respirations: (!) 23 (11/14/17 2016)  Blood Pressure: (!) 217/85 (11/14/17 2017)    Primary Survey:   (A) Airway: intact  (B) Breathing: equal breath sounds bilaterally   (C) Circulation: Pulses:   normal  (D) Disabliity:  Eye Opening:   None = 1, Motor Response: Withdraws to pain = 4 and Verbal Response:  None = 1  (E) Expose:  Completed    Secondary Survey: (Click on Physical Exam tab above)  Physical Exam   Constitutional: She appears well-developed and well-nourished  HENT:   Head: Normocephalic and atraumatic  Eyes:   R 2mm reactive  L cataract deformity 3mm reactive  Both midline    Neck:   No bony stepoffs    Cardiovascular: Normal rate and regular rhythm  Murmur heard  Pulmonary/Chest: She has no wheezes  She has no rales  Breath sounds equal bialterally    Abdominal: Soft  Bowel sounds are normal  She exhibits no distension  Musculoskeletal: She exhibits no edema or deformity  No deformities  Or obvious signs of trauma  Chronic wounds to bilateral LE    Neurological: She is unresponsive  GCS eye subscore is 1  GCS verbal subscore is 1  GCS motor subscore is 4  Unresponsive  Withdrawals to pain in all four extremities  Skin: Skin is warm and dry  Nursing note and vitals reviewed        Invasive Devices     Peripheral Intravenous Line            Peripheral IV 11/14/17 Left Antecubital less than 1 day                Lab Results:   Results: I have personally reviewed pertinent reports   , BMP/CMP:   Lab Results   Component Value Date     (L) 11/14/2017    K 3 6 11/14/2017    CL 97 (L) 11/14/2017    CO2 30 11/14/2017    ANIONGAP 6 11/14/2017    BUN 28 (H) 11/14/2017    CREATININE 1 51 (H) 11/14/2017    GLUCOSE 337 (H) 11/14/2017    GLUCOSE 333 (H) 11/14/2017    CALCIUM 8 8 11/14/2017    AST 16 11/14/2017    ALT 11 (L) 11/14/2017    ALKPHOS 95 11/14/2017    PROT 7 5 11/14/2017    ALBUMIN 2 6 (L) 11/14/2017    BILITOT 0 26 11/14/2017    EGFR 35 11/14/2017   , CBC:   Lab Results Component Value Date    WBC 22 06 (H) 11/14/2017    HGB 11 2 (L) 11/14/2017    HGB 11 6 11/14/2017    HCT 32 0 (L) 11/14/2017    MCV 80 (L) 11/14/2017     (H) 11/14/2017    MCH 26 4 (L) 11/14/2017    MCHC 32 8 11/14/2017    RDW 16 4 (H) 11/14/2017    MPV 10 2 11/14/2017    NRBC 0 11/14/2017    and Coagulation: No results found for: PT, INR, APTT  Imaging/EKG Studies: Results: I have personally reviewed pertinent reports  and I have personally reviewed pertinent films in PACS  CT Head: Decreased size of the known tiny anterior subfalcine subdural hematoma  No new findings      Code Status: Level 3 - DNAR and DNI  Advance Directive and Living Will: Yes    Power of : Yes  POLST:

## 2017-11-15 NOTE — CASE MANAGEMENT
Mary Wiley PA-C Physician Assistant Attested Addendum Trauma  Discharge Summaries Date of Service: 11/14/2017 10:19 AM      Attestation signed by Aaliyah Sylvester MD at 11/14/2017 11:08 AM   I agree with the Discharge Summary written by and discussed with Mary Wiley PA-C             Trauma Discharge Summary - Siva Trammell 80 y o  female MRN: [id number]     Unit/Bed#: [facility]PHP 902-01 Encounter: [id number]     Admission Date: 11/12/2017      Admitting Diagnosis: Subdural hematoma ([address] ) [I62 00]  Multiple wounds of skin [R23 8]  Unspecified multiple injuries, initial encounter [T07  XXXA]     HPI:    Siva Trammell is 80 year female with a significant past medical history of dementia, CHF, hypertension,diabetes, and chronic decubitus/lower extremity ulcerations  Her initially presented to [facility] 11/12/17 after she sustained a fall upon returning from bathroom and was found on ground in home  Per report patient has been experiencing multiple falls in the preceding days however had refused to come to the hospital   Patient lives at home alone, but has assistance 3 times daily  Patient confused at baseline oriented only to name and date of birth  CT imaging revealed acute midline subdural hemorrhage measuring 50 mm x 5 mm  Patient does not take ASA, or blood thinners  Subsequently she was transferred to [facility] for trauma evaluation and neurosurgical consultation         Procedures Performed: No orders of the defined types were placed in this encounter   [facility] Course:      Patient was admitted to trauma service under hot protocol, with neurosurgical consultation  She maintained GCS 14 with which appears to be baseline for patient  Repeat CT head revealed interval improvement in acute SDH  She was cleared from neurosurgical standpoint  Upon arrival patient was also noted to have mild he had CKD  Patient received dental p o  hydration given underlying CHF    Acute IVAN has resolved patient has returned to baseline of 1 4  During the course of this admission patient was also evaluated by wound care services, for multiple bilateral lower extremity chronic pressure/chronic venous stasis ulcerations  Upon discharge is encouraged to continue local wound care  Patient was evaluated by Geriatric Medicine there was concern that right leg pain may be attributed to DVT  Patient reassessed bedside she has multiple unstageable ulcerations behind the posterior knee were pain was noted  There is no swelling or tenderness in the calf or thigh pain is localized to ulcerated regions  It was felt that patient is unsafe to return home, living alone and she is no longer able to care for herself     This has been discussed with son  Patient will be [facility] to [facility]  At this time patient is stable, and is at baseline state of health  She is cleared from Neurosurgery/trauma standpoint  Information has been provided to follow up on as-needed basis      Significant Findings, Care, Treatment and Services Provided:   11/12: CTH Acute midline subdural hemorrhage along the anterior falx measuring 15 mm x 5 mm   11/12: Xray R ankle: No acute osseous abnormality  11/12 xray L ankle: No acute osseous abnormality  11/13: CTH Interval decrease in anterior falcine subdural hematoma        No new or additional abnormality         Complications: N/A     Discharge Diagnosis:      SDH  Dementia  Hypertension  Hyperlipidemia  DM II        Physical Exam   HENT:   Head: Normocephalic and atraumatic  Right Ear: External ear normal    Left Ear: External ear normal    Eyes: Conjunctivae are normal  Pupils are equal, round, and reactive to light  R pupil 2mm and reactive  L cataract  3 mm and reactive  Neck: Normal range of motion  Neck supple  Cardiovascular: Normal rate, regular rhythm, normal heart sounds and intact distal pulses  No murmur heard    Pulmonary/Chest: Effort normal and breath sounds normal  No respiratory distress  She has no wheezes  She has no rales  Abdominal: Soft  Bowel sounds are normal  She exhibits no distension and no mass  There is no tenderness  There is no guarding  Musculoskeletal: Normal range of motion  She exhibits no edema  Neurological:   Oriented to person and date of birth  Follows commands appropriately  Moves all 4 extremities without difficulties  Strength 5/5 both upper extremities  4-5 mm bilateral lower extremities  Skin:   Multiple chronic bilateral lower extremity ulcerations/pressure ulcers  Two unstageable ulcers of bilateral lower extremities behind the knees  Two stage II decubitus ulcerations POA             Condition at Discharge: stable      Discharge instructions/Information to patient and family:   See after visit summary for information provided to patient and family        Provisions for Follow-Up Care:  See after visit summary for information related to follow-up care and any pertinent home health orders        [address] [place] [address] at [facility]     Planned Readmission: No     Discharge Statement   I spent 25 minutes discharging the patient  This time was spent on the day of discharge  I had direct contact with the patient on the day of discharge  Additional documentation is required if more than 30 minutes were spent on discharge       Discharge Medications:  See after visit summary for reconciled discharge medications provided to patient and family                     Cosigned by: Anne-Marie Arredondo MD at 11/14/2017 11:08 AM   Revision History    [address] [facility] in the [facility] by Eric Perry for 2017  Network Utilization Review Department  Phone: [phone]; Fax [phone]  ATTENTION: The Network Utilization Review Department is now centralized for our 7 Facilities  Make a note that we have a new phone and fax numbers for our Department  Please call with any questions or concerns to [phone] and carefully follow the prompts so that you are directed to the right person  All voicemails are confidential  Fax any determinations, approvals, denials, and requests for initial or continue stay review clinical to [phone]  Due to HIGH CALL volume, it would be easier if you could please send faxed requests to expedite your requests and in part, help us provide discharge notifications faster

## 2017-11-15 NOTE — CONSULTS
Consultation - [address] [facility] Jaimie 80 y o  female MRN: [id number]  Unit/Bed#: ICU 11 Encounter: [id number]    Assessment/Plan     Assessment:  Patient is a 80year old female re-admitted to the hospital following possible seize-like activity  Wound care was consulted to see the patient today for her multiple b/l LE wounds with multiple etiologies and bilateral buttocks wounds  Her bilateral knees have both medial and lateral unstageable wounds present as documented below with scattered slough, eschar and pink granulation secondary to pressure from rolling down her ace wraps below her knees reported by her son who was present at the bedside at time of assessment  She also has bilateral foot pressure injuries secondary to her ace wraps  The left side has a stage II with 100% pink base  The right side has an unstageable wound with 100% yellow slough at the wound bed measuring as documented below  Her bilateral buttocks injuries were also present on admission  The openings on her right and left buttocks appear to be caused from pressure and are all stage II pressure injuries  The opening in her gluteal cleft appears to have been caused from MASD  Calazime cream is to be applied to her buttocks and gluteal cleft BID and PRN  Plan:  1-Wash b/l LE daily with hibiclens soap and water  2-Skin repair cream to b/l LE unopen areas daily  3-Silvasorb gel to all wounds on b/l LE, DSD, ABD and manuel  4-Betadine to L 2nd toe wounds  5-Hydraguard to b/l heels  6-Turn/reposition q2h for pressure re-distribution on skin  7-Elevate heels to offload pressure  8-Prevalon boots to b/l LE  9-Soft care cushion if out of bed  10-Calazime paste to sacrum, buttocks, and gluteal cleft TID and PRN    *Please consider podiatry consult for LE wounds*      Consults    Review of Systems    Historical Information   Past Medical History:   Diagnosis Date    [facility]congestive [facility] failure) [facility] [address] [facility]     Chronic pain     Dementia     [facility] mellitus [facility] [address] [facility]     Hypertension      Past Surgical History:   Procedure Laterality Date    HYSTERECTOMY       Social History   History   Alcohol Use    Yes     Comment: occassionaly wine drinker     History   Drug Use No     History   Smoking Status    Former Smoker    Quit date: 1947   Smokeless Tobacco    Never Used     Comment: patient no longer a smoker       Objective   Vitals: Blood pressure 140/70, pulse 82, temperature (!) 101 7 °F (38 7 °C), temperature source Rectal, resp  rate (!) 24, height 5' 2" (1 575 m), weight 67 7 kg (149 lb 4 oz), SpO2 100 %  Wounds:     Pressure Ulcer 11/13/17 Knee Posterior;Right; Outer R posterolateral knee unstageable pressure injury  Wound # 1 (Active)   Staging Unstagable 11/15/2017 11:00 AM   Wound Description Black;Fragile;Slough; Yellow 11/15/2017 11:00 AM   Darcy-wound Assessment Dry; Intact 11/15/2017 11:00 AM   Wound Length (cm) 1 6 cm 11/15/2017 11:00 AM   Wound Width (cm) 3 8 cm 11/15/2017 11:00 AM   Wound Depth (cm) 0 11/15/2017 11:00 AM   Calculated Wound Area (cm^2) 6 08 cm^2 11/15/2017 11:00 AM   Calculated Wound Volume (cm^3) 0 cm^3 11/15/2017 11:00 AM   Change in Wound Size % 100 11/15/2017 11:00 AM   Drainage Amount None 11/15/2017 11:00 AM   Drainage Description Tan 11/14/2017 12:15 PM   Treatment Cleansed; Turn & reposition 11/15/2017 11:00 AM   Dressing Silvasorb gel 11/15/2017 11:00 AM   Dressing Changed New dressing applied 11/15/2017 11:00 AM   Patient Tolerance Tolerated well 11/15/2017 11:00 AM   Dressing Status Clean;Dry; Intact 11/15/2017 11:00 AM       Pressure Ulcer 11/13/17 Buttocks Right R buttocks stage 2 pressure injury  Wound # 2 (Active)   Staging Stage II 11/15/2017 11:00 AM   Wound Description Pink 11/15/2017 11:00 AM   Darcy-wound Assessment Clean;Dry; Intact 11/15/2017 11:00 AM   Wound Length (cm) 0 2 cm 11/15/2017 11:00 AM   Wound Width (cm) 0 3 cm 11/15/2017 11:00 AM   Wound Depth (cm) 0 1 11/15/2017 11:00 AM Calculated Wound Area (cm^2) 0 06 cm^2 11/15/2017 11:00 AM   Calculated Wound Volume (cm^3) 0 01 cm^3 11/15/2017 11:00 AM   Change in Wound Size % 66 67 11/15/2017 11:00 AM   Drainage Amount None 11/15/2017 11:00 AM   Treatment Offload; Turn & reposition 11/14/2017 12:15 PM   Dressing Moisture barrier 11/15/2017 11:00 AM   Patient Tolerance Tolerated well 11/15/2017 11:00 AM       Pressure Ulcer 11/13/17 Buttocks Left;Proximal L proximal buttock stage 2 pressure injury  Wound # 3 (Active)   Staging Stage II 11/15/2017 11:00 AM   Wound Description Pink 11/15/2017 11:00 AM   Darcy-wound Assessment Clean;Dry; Intact 11/15/2017 11:00 AM   Wound Length (cm) 1 5 cm 11/15/2017 11:00 AM   Wound Width (cm) 1 8 cm 11/15/2017 11:00 AM   Wound Depth (cm) 0 2 11/15/2017 11:00 AM   Calculated Wound Area (cm^2) 2 7 cm^2 11/15/2017 11:00 AM   Calculated Wound Volume (cm^3) 0 54 cm^3 11/15/2017 11:00 AM   Change in Wound Size % -1250 11/15/2017 11:00 AM   Drainage Amount None 11/15/2017 11:00 AM   Treatment Cleansed; Turn & reposition 11/15/2017 11:00 AM   Dressing Moisture barrier 11/15/2017 11:00 AM   Dressing Changed Changed 11/13/2017 10:00 AM   Patient Tolerance Tolerated well 11/13/2017 10:00 AM   Dressing Status Clean;Dry; Intact 11/14/2017 12:15 PM       Pressure Ulcer 11/13/17 Knee Left;Posterior; Outer L postero-lateral knee unstageable pressure injury  wound # 4 (Active)   Staging Unstagable 11/15/2017 11:00 AM   Wound Description Pink;Slough; Yellow 11/15/2017 11:00 AM   Darcy-wound Assessment Intact;Fragile 11/15/2017 11:00 AM   Wound Length (cm) 1 cm 11/15/2017 11:00 AM   Wound Width (cm) 1 9 cm 11/15/2017 11:00 AM   Wound Depth (cm) 0 3 11/15/2017 11:00 AM   Calculated Wound Area (cm^2) 1 9 cm^2 11/15/2017 11:00 AM   Calculated Wound Volume (cm^3) 0 57 cm^3 11/15/2017 11:00 AM   Change in Wound Size % 68 33 11/15/2017 11:00 AM   Drainage Amount None 11/15/2017 11:00 AM   Treatment Cleansed; Turn & reposition 11/15/2017 11:00 AM   Dressing Silvasorb gel 11/15/2017 11:00 AM   Dressing Changed New dressing applied 11/15/2017 11:00 AM   Patient Tolerance Tolerated well 11/15/2017 11:00 AM   Dressing Status Clean;Dry; Intact 11/15/2017 11:00 AM       Pressure Ulcer 11/13/17 Knee Left;Posterior; Inner L postero-medial knee unstageable pressure injury  Wound # 5 (Active)   Staging Unstagable 11/15/2017 11:00 AM   Wound Description Black;Pink;Slough; Yellow 11/15/2017 11:00 AM   Darcy-wound Assessment Intact;Fragile 11/15/2017 11:00 AM   Wound Length (cm) 2 1 cm 11/15/2017 11:00 AM   Wound Width (cm) 5 9 cm 11/15/2017 11:00 AM   Wound Depth (cm) 0 4 11/15/2017 11:00 AM   Calculated Wound Area (cm^2) 12 39 cm^2 11/15/2017 11:00 AM   Calculated Wound Volume (cm^3) 4 96 cm^3 11/15/2017 11:00 AM   Change in Wound Size % 31 11 11/15/2017 11:00 AM   Drainage Amount None 11/15/2017 11:00 AM   Treatment Cleansed; Turn & reposition 11/15/2017 11:00 AM   Dressing Silvasorb gel 11/15/2017 11:00 AM   Dressing Changed New dressing applied 11/15/2017 11:00 AM   Patient Tolerance Tolerated well 11/15/2017 11:00 AM   Dressing Status Clean;Dry; Intact 11/15/2017 11:00 AM       Pressure Ulcer 11/13/17 Buttocks Left;Distal L buttock distal stage 2 pressure injury  Wound # 6 (Active)   Staging Stage II 11/15/2017 11:00 AM   Wound Description Pink 11/15/2017 11:00 AM   Darcy-wound Assessment Clean;Dry; Intact 11/15/2017 11:00 AM   Wound Length (cm) 0 7 cm 11/15/2017 11:00 AM   Wound Width (cm) 0 7 cm 11/15/2017 11:00 AM   Wound Depth (cm) 0 1 11/15/2017 11:00 AM   Calculated Wound Area (cm^2) 0 49 cm^2 11/15/2017 11:00 AM   Calculated Wound Volume (cm^3) 0 05 cm^3 11/15/2017 11:00 AM   Change in Wound Size % 61 54 11/15/2017 11:00 AM   Drainage Amount None 11/15/2017 11:00 AM   Treatment Offload; Turn & reposition 11/14/2017 12:15 PM   Dressing Moisture barrier 11/15/2017 11:00 AM   Dressing Changed New dressing applied 11/15/2017 11:00 AM   Patient Tolerance Tolerated well 11/15/2017 11:00 AM       Pressure Ulcer 11/13/17 Foot Left L foot stage 2 pressure injury  Wound # 17 (Active)   Staging Stage II 11/15/2017 11:00 AM   Wound Description Granulation tissue; Beefy red 11/14/2017 12:15 PM   Darcy-wound Assessment Dry; Intact;Fragile 11/15/2017 11:00 AM   Wound Length (cm) 0 3 cm 11/15/2017 11:00 AM   Wound Width (cm) 0 5 cm 11/15/2017 11:00 AM   Wound Depth (cm) 0 1 11/15/2017 11:00 AM   Calculated Wound Area (cm^2) 0 15 cm^2 11/15/2017 11:00 AM   Calculated Wound Volume (cm^3) 0 02 cm^3 11/15/2017 11:00 AM   Change in Wound Size % 80 11/15/2017 11:00 AM   Drainage Amount Small 11/15/2017 11:00 AM   Drainage Description Sanguineous 11/15/2017 11:00 AM   Treatment Cleansed;Irrigation with NSS;Turn & reposition 11/15/2017 11:00 AM   Dressing Calcium Alginate 11/15/2017 11:00 AM   Dressing Changed New dressing applied 11/15/2017 11:00 AM   Patient Tolerance Tolerated well 11/15/2017 11:00 AM   Dressing Status Clean;Dry; Intact 11/15/2017 11:00 AM       Pressure Ulcer 11/13/17 Foot Right R foot unstageable pressure injury  Wound # 9 (Active)   Staging Deep Tissue Injury 11/15/2017 11:00 AM   Wound Description Beefy red 11/14/2017 10:56 PM   Darcy-wound Assessment Fragile;Erythema 11/14/2017 10:56 PM   Wound Length (cm) 5 cm 11/15/2017 11:00 AM   Wound Width (cm) 4 cm 11/15/2017 11:00 AM   Wound Depth (cm) 0 11/15/2017 11:00 AM   Calculated Wound Area (cm^2) 20 cm^2 11/15/2017 11:00 AM   Calculated Wound Volume (cm^3) 0 cm^3 11/15/2017 11:00 AM   Drainage Amount None 11/15/2017 11:00 AM   Treatment Cleansed;Elevated with pillow(s); Turn & reposition 11/15/2017 11:00 AM   Dressing Moisture barrier 11/15/2017 11:00 AM   Dressing Changed Changed 11/14/2017 12:15 PM   Patient Tolerance Tolerated well 11/15/2017 11:00 AM   Dressing Status Clean;Dry; Intact 11/15/2017 11:00 AM       Pressure Ulcer 11/13/17 Knee Right;Proximal R postero-medial unstageable pressure injury  woud  # 10 (Active)   Staging Unstagable 11/15/2017 11:00 AM   Wound Description Beefy red;Black;Slough; Yellow 11/15/2017 11:00 AM   Darcy-wound Assessment Intact;Fragile 11/15/2017 11:00 AM   Wound Length (cm) 1 9 cm 11/15/2017 11:00 AM   Wound Width (cm) 4 5 cm 11/15/2017 11:00 AM   Wound Depth (cm) 0 3 11/15/2017 11:00 AM   Calculated Wound Area (cm^2) 8 55 cm^2 11/15/2017 11:00 AM   Calculated Wound Volume (cm^3) 2 56 cm^3 11/15/2017 11:00 AM   Change in Wound Size % 48 8 11/15/2017 11:00 AM   Treatment Cleansed;Elevated with pillow(s); Turn & reposition 11/15/2017 11:00 AM   Dressing Silvasorb gel 11/15/2017 11:00 AM   Dressing Changed New dressing applied 11/15/2017 11:00 AM   Patient Tolerance Tolerated well 11/15/2017 11:00 AM   Dressing Status Clean;Dry; Intact 11/15/2017 11:00 AM       Pressure Ulcer 11/14/17 Other (Comment) Left purple discoloration, L gluteal cleft (Active)   Staging Deep Tissue Injury 11/15/2017 11:00 AM   Wound Description Fragile;Light purple 11/15/2017 11:00 AM   Darcy-wound Assessment Intact 11/15/2017 11:00 AM   Wound Length (cm) 7 cm 11/15/2017 11:00 AM   Wound Width (cm) 7 cm 11/15/2017 11:00 AM   Wound Depth (cm) 0 11/15/2017 11:00 AM   Calculated Wound Area (cm^2) 49 cm^2 11/15/2017 11:00 AM   Calculated Wound Volume (cm^3) 0 cm^3 11/15/2017 11:00 AM   Treatment Turn & reposition; Other (Comment) 11/15/2017 11:00 AM   Patient Tolerance Tolerated well 11/15/2017 11:00 AM       Other Ulcers 11/13/17 Leg Right R calf venous stasis partial thickness wounds       Wound # 11 (Active)   Wound Description Beefy red;Bleeding 11/14/2017 10:56 PM   Darcy-wound Assessment Fragile;Erythema 11/14/2017 10:56 PM   Wound Length (cm) 3 5 cm 11/14/2017 10:56 PM   Wound Width (cm) 2 5 cm 11/14/2017 10:56 PM   Wound Depth (cm) 0 1 11/13/2017 10:00 AM   Calculated Wound Volume (cm^3) 0 9 cm^3 11/13/2017 10:00 AM   Treatment Cleansed 11/14/2017 10:56 PM   Dressings Gauze 11/14/2017 10:56 PM Dressing Changed Changed 11/14/2017 12:15 PM   Dressing Status Clean;Dry; Intact 11/14/2017 12:15 PM       Other Ulcers 11/13/17 Leg Right R shin venous stasis partial thickness wound  Wound # 12 (Active)   Wound Description Fragile;Pink 11/15/2017 11:00 AM   Darcy-wound Assessment Dry; Intact 11/15/2017 11:00 AM   Wound Length (cm) 1 3 cm 11/15/2017 11:00 AM   Wound Width (cm) 1 8 cm 11/15/2017 11:00 AM   Wound Depth (cm) 0 2 11/15/2017 11:00 AM   Calculated Wound Volume (cm^3) 0 47 cm^3 11/15/2017 11:00 AM   Change in Wound Size % -46 88 11/15/2017 11:00 AM   Drainage Amount None 11/15/2017 11:00 AM   Treatment Cleansed 11/14/2017 10:56 PM   Dressings Vaseline gauze 11/15/2017 11:00 AM   Dressing Changed New dressing applied 11/15/2017 11:00 AM   Patient Tolerance Tolerated well 11/15/2017 11:00 AM   Dressing Status Clean;Dry; Intact 11/15/2017 11:00 AM       Other Ulcers 11/13/17 Leg Left L shin venous stasis partial thickness wound  Wound # 13 (Active)   Wound Description Beefy red;Bleeding 11/14/2017 10:56 PM   Darcy-wound Assessment Fragile 11/14/2017 10:56 PM   Wound Length (cm) 3 cm 11/14/2017 10:56 PM   Wound Width (cm) 4 5 cm 11/14/2017 10:56 PM   Wound Depth (cm) 0 1 11/13/2017 10:00 AM   Calculated Wound Volume (cm^3) 1 04 cm^3 11/13/2017 10:00 AM   Treatment Cleansed 11/14/2017 10:56 PM   Dressings Gauze 11/14/2017 10:56 PM   Patient Tolerance Tolerated well 11/13/2017 10:00 AM   Dressing Status Clean;Dry; Intact 11/14/2017 12:15 PM       Other Ulcers 11/13/17 Leg Left L calf venous stasis partial thickness wound       Wound # 14 (Active)   Wound Description Beefy red;Bleeding 11/14/2017 10:56 PM   Darcy-wound Assessment Fragile 11/14/2017 12:15 PM   Shape round 11/13/2017  7:00 AM   Wound Length (cm) 2 cm 11/14/2017 10:56 PM   Wound Width (cm) 1 5 cm 11/14/2017 10:56 PM   Wound Depth (cm) 0 1 11/13/2017 10:00 AM   Calculated Wound Volume (cm^3) 0 26 cm^3 11/13/2017 10:00 AM   Treatment Cleansed 11/14/2017 10:56 PM   Dressings Gauze 11/14/2017 10:56 PM   Patient Tolerance Tolerated well 11/13/2017 10:00 AM   Dressing Status Clean;Dry; Intact 11/14/2017 12:15 PM       Other Ulcers 11/13/17 Other (Comment) Left L second toe plantar aspect diabetic ulcer  Wound # 15 (Active)   Wound Description Dry; Intact; Black 11/15/2017 11:00 AM   Darcy-wound Assessment Intact 11/15/2017 11:00 AM   Wound Length (cm) 0 9 cm 11/15/2017 11:00 AM   Wound Width (cm) 0 9 cm 11/15/2017 11:00 AM   Wound Depth (cm) 0 11/15/2017 11:00 AM   Calculated Wound Volume (cm^3) 0 cm^3 11/15/2017 11:00 AM   Drainage Amount None 11/15/2017 11:00 AM   Treatment Other (Comment) 11/15/2017 11:00 AM   Patient Tolerance Tolerated well 11/15/2017 11:00 AM       Other Ulcers 11/13/17 Other (Comment) Left L second toe dorsal aspect     Wound # 16 (Active)   Wound Description Dry; Intact; Black 11/15/2017 11:00 AM   Darcy-wound Assessment Intact 11/15/2017 11:00 AM   Wound Length (cm) 0 6 cm 11/15/2017 11:00 AM   Wound Width (cm) 0 6 cm 11/15/2017 11:00 AM   Wound Depth (cm) 0 11/15/2017 11:00 AM   Calculated Wound Volume (cm^3) 0 cm^3 11/15/2017 11:00 AM   Change in Wound Size % 100 11/15/2017 11:00 AM   Drainage Amount None 11/15/2017 11:00 AM   Treatment Other (Comment) 11/15/2017 11:00 AM   Dressings Gauze 11/14/2017 12:15 PM   Dressing Changed New dressing applied 11/15/2017 11:00 AM   Patient Tolerance Tolerated well 11/15/2017 11:00 AM   Dressing Status Clean;Dry; Intact 11/14/2017 12:15 PM       Other Ulcers 11/14/17 Leg Lower; Posterior;Right venous stasis ulcer (3 openings entire area measured) (Active)   Wound Description Fragile;Pink 11/15/2017 11:00 AM   Darcy-wound Assessment Intact;Fragile 11/15/2017 11:00 AM   Wound Length (cm) 7 cm 11/15/2017 11:00 AM   Wound Width (cm) 5 cm 11/15/2017 11:00 AM   Wound Depth (cm) 0 1 11/15/2017 11:00 AM   Calculated Wound Volume (cm^3) 3 5 cm^3 11/15/2017 11:00 AM   Drainage Amount None 11/15/2017 11:00 AM Treatment Cleansed;Elevated with pillow(s); Turn & reposition 11/15/2017 11:00 AM   Dressings Vaseline gauze 11/15/2017 11:00 AM   Dressing Changed New dressing applied 11/15/2017 11:00 AM   Patient Tolerance Tolerated well 11/15/2017 11:00 AM   Dressing Status Clean;Dry; Intact 11/15/2017 11:00 AM         Physical Exam     Will continue to follow up with patient weekly   Please call ex  June Campos or [phone] with any further questions or concenrs    Donna Luque RN MSN [facility]

## 2017-11-16 NOTE — RESPIRATORY THERAPY NOTE
RT Protocol Note  Vaishnavi Turner 80 y o  female MRN: [id number]  Unit/Bed#: ICU 11 Encounter: [id number]    Assessment    Principal Problem:    Seizure ([facility] [address] )  Active Problems:    Altered mental status    Subdural hematoma, chronic ([facility])      Home Pulmonary Medications:  No home pulmonary medications  Past Medical History:   Diagnosis Date    CHF (congestive heart failure) ([facility])     Chronic pain     Dementia     Diabetes mellitus ([facility] [address] )     Hypertension      Social History     Social History    Marital status:      Spouse name: N/A    Number of children: N/A    Years of education: N/A     Social History Main Topics    Smoking status: Former Smoker     Quit date: 1947    Smokeless tobacco: Never Used      Comment: patient no longer a smoker    Alcohol use Yes      Comment: occassionaly wine drinker    Drug use: No    Sexual activity: Not Asked     Other Topics Concern    None     Social History Narrative    None       Subjective         Objective    Physical Exam:   Assessment Type: Assess only  General Appearance: Unresponsive  Respiratory Pattern: Tachypneic  Chest Assessment: Chest expansion symmetrical  Bilateral Breath Sounds: Coarse, Diminished    Vitals:  Blood pressure 127/51, pulse 94, temperature 99 9 °F (37 7 °C), temperature source Oral, resp  rate (!) 31, height 5' 2" (1 575 m), weight 67 7 kg (149 lb 4 oz), SpO2 98 %  Imaging and other studies: I have personally reviewed pertinent reports              Plan    Respiratory Plan: Vent/NIV/HFNC        Resp Comments: due to pt being on hfnc at this time pt respiratory protocol ordered

## 2017-11-16 NOTE — SPEECH THERAPY NOTE
Speech/Language Pathology  Assessment    Patient Name: Velasquez Boyd  [id number] Date: 11/16/2017     Problem List  Patient Active Problem List   Diagnosis    Essential hypertension    Elevated troponin    [facility]umatic rhabdomyolysis [facility] [address] [facility]    UTI (urinary tract infection)    Fall at home    Diabetes mellitus type 2, controlled ([facility] [address] )    Acute renal failure superimposed on stage 3 chronic kidney disease (HCC)    Lactic acidosis    Dementia    Dyspnea    Pulmonary edema    Acute CHF (congestive heart failure) (HCC)    CKD (chronic kidney disease) stage 3, GFR 30-59 ml/min    SIRS (systemic inflammatory response syndrome) (HCC)    Accelerated hypertension    Subdural hematoma (HCC)    Multiple falls    History of CHF (congestive heart failure)    Acute-on-chronic kidney injury ([facility] [address] )    Dementia    Seizure (HCC)    Altered mental status    Subdural hematoma, chronic (HCC)     Past Medical History  Past Medical History:   Diagnosis Date    CHF (congestive heart failure) (HCC)     Chronic pain     Dementia     Diabetes mellitus ([facility] [address] )     Hypertension      Past Surgical History  Past Surgical History:   Procedure Laterality Date    HYSTERECTOMY       80 y o  female who presents from [facility] after a witnessed seizure  Patient was admitted to [facility] 2 days ago after a fall for a subfalcine subdural hematoma  She was discharged today to [facility]  While there, she was reported to whole body twitching, L sided gaze, and then went unresponsive  She was given Ativan by EMS and seizure activity stopped  Upon arrival to the ED, patient has continued to be unresponsive and only withdrawals to pain  Son is at bedside and states that patient is DNR/DNI  She had a repeat CTH, which showed decreased size pf previous bleed  No known new trauma  Pt w/ frequent falls in the past prior to [facility]       Reason for consult:  R/o aspiration  Determine safest and least restrictive diet  Failed nursing dysphagia assessment  Change in mental status    Precautions:  sz  Current diet:  NPO  Premorbid diet[de-identified]  Regular w/ thin  Previous VBS:  None noted  O2 requirement:  NC  Voice/Speech:  Pt w/ min speech once alert, low volume, mild hoarseness only stated "I'm in the hospital?"  Social:  Home alone w/ care several times a day, goes to adult day program   Recently at [facility] for rehab following a recent fall  Follows commands:                  no        Cognitive Status:  Lethargic, needs stim to remain alert, awake  Cannot maintain  Oral mech exam:    Partial dentition  Head to the R, uncertain if there is facial droop  Items administered:  Puree,  honey thick liquid,  Tsp only    Oral stage:  Lip closure: poor, min opening for the tsp  Mastication: na  Bolus formation: poor  Bolus control: poor control  Transfer: mod reduced/severe reduced, suspect gravity transfer at times though there is min movement at times  Oral residue: min  Pt w/ coughing prior to admin of material, able to suction mod amt of mucous via oral cavity  Pharyngeal stage:  Swallow promptness: mod delayed  Laryngeal rise: poor, incomplete  Cough: noted w/ all presentation, suctioned orally to attempt to clear    Esophageal stage:  No s/s reported    Summary:  Pt presents w/ severe oropharyngeal dysphagia w/ poor retrieval, weak manipulation & transfers, significant swallow delay & incomplete rise  +s/s aspiration w/ even conservative amts of puree/ht  Recommendations:  NPO, iv meds  Speech will f/u for trials  Determine plan of care  Results d/w:  Pt, nsg    Goal(s):    Pt will tolerate least restrictive diet w/out s/s aspiration or oral/pharyngeal difficulties

## 2017-11-16 NOTE — PROCEDURES
Continuous Video EEG  Long Term Monitoring    Patient Name:  Clarisse Lehman  MRN: [id number]   :  1920 File #: Betina Fitzgerald    Age: 80 y o  Encounter #: [id number]   Date performed: 11/ Referring Provider: Chetan Castanon DO          Report date: 2017          Study type: Continuous video EEG, up to 24 hours    ICD 10 diagnosis: Spells/Fit NOS R56 9 and Encephalopathy, unspecified G93 40    Start time: 11/15/2017 08:01  End time: 2017 08:00    Patient History:  Patient is 80 y o  female on continuous video EEG monitoring for the assessment of seizures, characterization of events, and effect of treatment  Patient was recently discharged from the hospital after stable subfalcine subdural hematoma, returned home after with a seizure, she continued to be encephalopathic  EEG showed recurrent seizures from the right temporal region  Current AEDs:  Medications include:   heparin (porcine) 5,000 Units Subcutaneous Q8H [address]   insulin lispro 2-12 Units Subcutaneous TID AC   levETIRAcetam 750 mg Intravenous Q12H TOLU   phenytoin 100 mg Intravenous Q12H [address]   piperacillin-tazobactam 2 25 g Intravenous Q6H   vancomycin 17 5 mg/kg Intravenous Q24H       Description of Procedure:   A 24 hours continuous video EEG was performed with electrodes applied using the International 10-20 System at least 16 channels are reviewed and formatted into longitudinal bipolar, transverse bipolar, and referential (to common reference or calculated common reference) montages  Additional electrodes used included T1, T2, and extraocular electrodes, and ECG, along with video recording  The EEG was recorded with the patient awake, drowsy, and asleep state  A monitoring technologist supervised the continuous recording  The recording was technically satisfactory  Findings: The background is asymmetric due to continuous slower activity over the right hemisphere along with recurrent electrographic seizures    The left hemisphere is predominantly lower voltage, better organized, during periods of wakefulness there appears to be very low-voltage alpha-theta activity in the posterior region  The right hemisphere consist of low-moderate voltage polymorphic 5-7 Hz theta activity and 2-3 Hz delta activity  There are frequent episodic runs of sharply contoured rhythmic 7 Hz theta activity electropositive maximal over T4-T6 involving the centro-temporal and parieto-temporal region which is then followed by a period of polymorphic 2-3 Hz delta activity and episodic very low-voltage polyspike waves at 1 Hz  (Best seen on transverse bipolar montage with electronegative deflections on C4-T4 and P4-T6 )  These electrographic seizures last about 45-60 seconds recurring every 5-10 minutes between 8AM and 11AM     After 11AM, the electrographic seizures over the right temporal region become more difficult to detect due to relatively low voltage compared to higher voltage EMG artifact when the patient is awake  There is no detectable electrographic seizure after 2:00 PM     After 2PM, there is quasiperiodic lateralized epileptiform discharges (quasi-PLEDs) over the right posterior termporal region  Sleep is characterized by attenuation of background voltages and fragmentary sleep spindles  Other findings: The single lead ECG shows a regular and sinus rhythm  Interpretation: This is an abnormal 24 hours continuous video EEG recording due to recurrent multiple electrographic seizures over the right hemisphere maximal over the right temporal region (more towards the parasagittal region) consistent with focal electrographic status epilepticus superimposed over right hemispheric dysfunction  These findings were communicated to the primary team and Neurology consultants    Electrographic seizures are difficult to detect due to low voltage however no seizures were present after 2PM   Quasi-peridic lateralized epileptiform discharges (quasi-PLEDs) over the right posterior temporal region superimposed on right hemispheric focal slowing indicates a highly epileptogenic structural lesion      Sue Kumar MD  [facility]  [address] [place] [address]

## 2017-11-16 NOTE — PROGRESS NOTES
Progress Note - Critical Care   Parth Baumann 80 y o  female MRN: [id number]  Unit/Bed#: ICU 11 Encounter: [id number]    Attending Physician: Emily Sales MD  ______________________________________________________________________  Assessment and Plan:   1  Possible non-convulsive status  2  Subfalcine subdural  3  Acute encephalopathy  4  Acute hypoxic respiratory failure  5  Leukocytosis  6  Acute kidney injury  7  Hypokalemia  8  Anemia    Neuro: Frequent neuro checks, continue Keppra, Dilantin, and PRN fentanyl  Appreciate neurology recommendations    CV: Close hemodynamic monitoring     Pulm: Encourage pulmonary hygiene as patient able to participate, suction as needed, wean O2 as tolerated    GI: Pending goals of care, if continuing care would advocate gaining enteral access    : Closely follow intake and output, daily weights, trend serum creatinine, would consider increasing hydration pending goals of care    F/E/N: Continue IVF, replete electrolytes as needed, nutrition status pending goals of care    ID: Day 2 of vancomycin and Zosyn, follow culture results, temperature curve and white count    Heme: Anemia, likely dilutional, would repeat pending goals of care, continue heparin for now    Endo: Follow blood sugars q6 while NPO     Msk/Skin: Frequent turning and repositioning    Disposition: Pending goals of care talk    Code Status: Level 3 - DNAR and DNI    Counseling / Coordination of Care  Total Critical Care time spent 31 minutes excluding procedures, teaching and family updates      ______________________________________________________________________    Chief Complaint: "Get away"    24 Hour Events: Continuous EEG demonstrated possible electrogaphic evidence of seizures versus artifact and given additional Dilantin, goals of care talk ongoing between family, critical care, and palliative  ______________________________________________________________________    Physical Exam:   Gen: Inge, in no acute distress  Neuro: GCS 10 E1 V4 M5, flexes bilateral lower extremities, withdraws left upper, localizes with right upper, corneal intact, cough intact  HEENT: Atraumatic, PERRL, conjugate gaze, poor dentition, trachea midline, no JVD  CV: Regular rate and rhythm  Pulm: Lung sounds rhonchorous bilaterally, non-productive cough  GI: Abdomen soft, tender, non-distended with bowel sounds present  : Dean in place with clear yellow urine  MSK: 1+ generalized edema, multiple pressure ulcers and venous stasis wounds of the lower extremities   Skin: Warm, dry  ______________________________________________________________________  Vitals:    17 0300 17 0335 17 0400 17 0500   BP: (!) 102/36  (!) 100/44 (!) 114/43   Pulse: 82  82 82   Resp: (!) 26  (!) 25 (!) 27   Temp: 98 3 °F (36 8 °C)      TempSrc: Oral      SpO2: 100% 100% 100% 98%   Weight:       Height:           Temperature:   Temp (24hrs), Av 6 °F (37 6 °C), Min:98 3 °F (36 8 °C), Max:101 7 °F (38 7 °C)    Current Temperature: 98 3 °F (36 8 °C)  Weights:   IBW: 50 1 kg    Body mass index is 27 3 kg/m²    Weight (last 2 days)     Date/Time   Weight    177  67 7 (149 25)    17 2016  70 3 (155)            Hemodynamic Monitoring:  N/A     Non-Invasive/Invasive Ventilation Settings:  Respiratory    Lab Data (Last 4 hours)    None         O2/Vent Data (Last 4 hours)       033          Non-Invasive Ventilation Mode HFNC                 No results found for: PHART, FIP6RAI, PO2ART, UWP2XSV, O7AGVCSX, BEART, SOURCE  SpO2: SpO2: 98 %, SpO2 Activity: SpO2 Activity: At Rest, SpO2 Device: O2 Device: Nasal cannula (HFNC)  Intake and Outputs:  I/O        07 - 11/15 0700 11/15 07 -  0700    I V  (mL/kg) 1198 3 (17 7) 2851 7 (42 1)    IV Piggyback  950    Total Intake(mL/kg) 1198 3 (17 7) 3801 7 (56 2)    Urine (mL/kg/hr) 1100 975 (0 6)    Total Output 1100 975    Net +98 3 +2826 7          Unmeasured Urine Occurrence 1 x           UOP: 40/hour     Nutrition:        Diet Orders            Start     Ordered    11/14/17 2210  Diet NPO  Diet effective now     Question Answer Comment   Diet Type NPO    RD to adjust diet per protocol? Yes        11/14/17 2213        Labs:     Results from last 7 days  Lab Units 11/16/17  0428 11/15/17  0534 11/14/17 2022 11/14/17 2021 11/12/17 1952   WBC Thousand/uL 30 07* 28 10*  --  22 06*  < > 13 82*   HEMOGLOBIN g/dL 8 2* 10 0*  --  10 5*  < > 9 3*   I STAT HEMOGLOBIN g/dl  --   --  11 6  11 2*  --   --   --    HEMATOCRIT % 25 5* 30 2*  --  32 0*  < > 28 9*   PLATELETS Thousands/uL 395* 515*  --  592*  < > 460*   NEUTROS PCT %  --   --   --  61  --  83*   MONOS PCT %  --   --   --  8  --  6   MONO PCT MAN %  --  4  --   --   --   --    < > = values in this interval not displayed      Results from last 7 days  Lab Units 11/16/17  0428 11/15/17  0534 11/14/17 2022 11/14/17 2021   SODIUM mmol/L 140 135*  --  133*   POTASSIUM mmol/L 3 3* 3 6  --  3 6   CHLORIDE mmol/L 102 99*  --  97*   CO2 mmol/L 28 28  --  30   BUN mg/dL 26* 24  --  28*   CREATININE mg/dL 1 40* 1 25  --  1 51*   CALCIUM mg/dL 7 6* 8 4  --  8 8   TOTAL PROTEIN g/dL  --   --   --  7 5   BILIRUBIN TOTAL mg/dL  --   --   --  0 26   ALK PHOS U/L  --   --   --  95   ALT U/L  --   --   --  11*   AST U/L  --   --   --  16   GLUCOSE RANDOM mg/dL 276* 219*  --  312*   GLUCOSE, ISTAT mg/dl  --   --  333*  337*  --          Lab Results   Component Value Date    PHOS 3 4 05/03/2017        Results from last 7 days  Lab Units 11/12/17 1952   INR  1 00   PTT seconds 26       0  Lab Value Date/Time   TROPONINI 0 22 (H) 05/08/2017 0525   TROPONINI 0 27 (H) 05/08/2017 0229   TROPONINI 11 70 (H) 05/03/2017 0840   TROPONINI 12 68 (H) 05/03/2017 0111   TROPONINI 12 75 (H) 05/02/2017 2142   TROPONINI 9 83 (H) 05/02/2017 1718       Results from last 7 days  Lab Units 11/15/17  1116 11/15/17  0534 11/15/17  0048   LACTIC ACID mmol/L 1 3 2  3* 1 7     ABG:No results found for: PHART, CAJ0VAT, PO2ART, JRR0EEO, P3DDFTGG, BEART, SOURCE    Imagin/15 CXR- Bilateral lower lobe opacities, my interpretation I have personally reviewed pertinent films in PACS    EKG: Review of telemetry demonstrates sinus rhythm    Micro:  Lab Results   Component Value Date    BLOODCX No Growth at 48 hrs  2017    BLOODCX No Growth at 48 hrs  2017    BLOODCX No Growth After 5 Days  2017    URINECX No Growth <1000 cfu/mL 2017    URINECX 10,000-19,000 cfu/ml Escherichia coli 2017    URINECX 30,000-39,000 cfu/ml Mixed Contaminants X3 2017       Allergies: No Known Allergies    Medications:   Scheduled Meds:  heparin (porcine) 5,000 Units Subcutaneous Q8H [address]   insulin lispro 2-12 Units Subcutaneous TID AC   levETIRAcetam 750 mg Intravenous Q12H [address]   phenytoin 100 mg Intravenous Q12H [address]   piperacillin-tazobactam 2 25 g Intravenous Q6H   potassium chloride 20 mEq Intravenous Once   vancomycin 17 5 mg/kg Intravenous Q24H     Continuous Infusions:  multi-electrolyte 100 mL/hr Last Rate: 100 mL/hr (17 0500)     PRN Meds:    acetaminophen 325 mg Q4H PRN   fentanyl citrate (PF) 25 mcg Q3H PRN   hydrALAZINE 5 mg Q6H PRN   influenza vaccine 0 5 mL Prior to discharge   metoprolol 5 mg Q6H PRN       VTE Pharmacologic Prophylaxis: Heparin  VTE Mechanical Prophylaxis: sequential compression device    Invasive lines and devices: Invasive Devices     Peripheral Intravenous Line            Peripheral IV 11/15/17 Left Arm less than 1 day    Peripheral IV 11/15/17 Right Forearm less than 1 day          Drain            Urethral Catheter Straight-tip 16 Fr  1 day                     Portions of the record may have been created with voice recognition software  Occasional wrong word or "sound a like" substitutions may have occurred due to the inherent limitations of voice recognition software    Read the chart carefully and recognize, using context, where substitutions have occurred      KARLO Abreu

## 2017-11-16 NOTE — PROGRESS NOTES
Progress Note - Neurology   Jewel Camilo 80 y o  female MRN: [id number]  Unit/Bed#: ICU 6 Encounter: [id number]    Assessment:  3 80year old female recently discharged from the hospital to SNF for rehab following admission for SDH re-admitted for seizure activity   - Recommend continue to monitor on vEEG  Discussed at bedside with patient's son, no seizures noted overnight  Will plan to continue monitoring today and also continue on Keppra and Phenytoin,    - Continue on Keppra 750 mg Q12H and Phenytoin 100 mg Q12H     - Check Phenytoin level and CMP in AM  Corrected level this morning 26 0     - Seizure precautions   - Monitor neuro exam closely and notify with changes  2  Atrial fibrillation   - Possible that patient may have had embolic strokes which would place her at risk for seizure   - Hold AC/AP at this time given SDH     - Consider MRI brain without contrast when able to evaluate for embolic strokes  - Rate control as per primary team      3  DM   - Insulin as per primary team      4  HTN   - Blood pressure control as per primary team  SBP goal <160  Subjective: Jewel Camilo is a 80year old female recently discharged from the hospital to SNF for rehab following admission for SDH who was re-admitted for seizure activity  Yesterday she was noted to have seizures on vEEG and Dilantin was added  No additional seizures noted overnight as per epileptologist  Per nursing at bedside, patient withdrawals on all four extremities but does not follow commands       ROS:  Unable to reliably assess secondary to mental status    Medications  Scheduled Meds:  heparin (porcine) 5,000 Units Subcutaneous Q8H [address]   insulin lispro 2-12 Units Subcutaneous TID AC   levETIRAcetam 750 mg Intravenous Q12H [address]   phenytoin 100 mg Intravenous Q12H [address]   piperacillin-tazobactam 2 25 g Intravenous Q6H   potassium chloride 20 mEq Intravenous Once   vancomycin 17 5 mg/kg Intravenous Q24H     Continuous Infusions:  multi-electrolyte 100 mL/hr Last Rate: Stopped (11/16/17 0845)     PRN Meds:   acetaminophen    fentanyl citrate (PF)    hydrALAZINE    influenza vaccine    metoprolol    Vitals: Blood pressure (!) 100/41, pulse 82, temperature 98 3 °F (36 8 °C), temperature source Oral, resp  rate (!) 28, height 5' 2" (1 575 m), weight 67 7 kg (149 lb 4 oz), SpO2 99 %  ,Body mass index is 27 3 kg/m²  Physical Exam: BP (!) 100/41   Pulse 82   Temp 98 3 °F (36 8 °C) (Oral)   Resp (!) 28   Ht 5' 2" (1 575 m)   Wt 67 7 kg (149 lb 4 oz)   SpO2 99%   BMI 27 30 kg/m²   General appearance: Opens eyes to noxious stimuli  Head: EEG leads in place  Eyes: negative findings: lids and lashes normal, conjunctivae and sclerae normal and L pupil irregular, R pupil 3 mm and reactive  Lungs: clear to auscultation bilaterally  Heart: irregularly irregular rhythm  Abdomen: Soft, not distended  Extremities: Sores noted to B/L toes and dressing intact to B/L feet  Skin: Skin color, texture, turgor normal  No rashes or lesions  Neurologic: Mental status: Opens eyes to noxious stimuli, speech dysarthric, able to state her name and her son's name but unable to answer any additional questions     Cranial nerves: II: pupils L pupil irregular, R pupil 3 mm and reactive, III,VII: ptosis No ptosis noted  Sensory: Unable to reliably assess secondary to mental status   Motor:Moves B/L LE to command R>L by wiggling toes, flexion withdrawal noted B/L UE    Labs  Recent Results (from the past 24 hour(s))   Lactic acid, plasma    Collection Time: 11/15/17 11:16 AM   Result Value Ref Range    LACTIC ACID 1 3 0 5 - 2 0 mmol/L   Fingerstick Glucose (POCT)    Collection Time: 11/15/17 11:32 AM   Result Value Ref Range    POC Glucose 174 (H) 65 - 140 mg/dl   Phenytoin level, total    Collection Time: 11/15/17  4:53 PM   Result Value Ref Range    Phenytoin Lvl 19 2 10 0 - 20 0 ug/mL   Fingerstick Glucose (POCT)    Collection Time: 11/15/17  4:55 PM Result Value Ref Range    POC Glucose 194 (H) 65 - 140 mg/dl   Fingerstick Glucose (POCT)    Collection Time: 11/15/17 11:44 PM   Result Value Ref Range    POC Glucose 230 (H) 65 - 140 mg/dl   CBC and differential    Collection Time: 11/16/17  4:28 AM   Result Value Ref Range    WBC 30 07 (HH) 4 31 - 10 16 Thousand/uL    RBC 3 18 (L) 3 81 - 5 12 Million/uL    Hemoglobin 8 2 (L) 11 5 - 15 4 g/dL    Hematocrit 25 5 (L) 34 8 - 46 1 %    MCV 80 (L) 82 - 98 fL    MCH 25 8 (L) 26 8 - 34 3 pg    MCHC 32 2 31 4 - 37 4 g/dL    RDW 16 6 (H) 11 6 - 15 1 %    MPV 10 1 8 9 - 12 7 fL    Platelets 989 (H) 151 - 390 Thousands/uL    nRBC 0 /100 WBCs   Basic metabolic panel    Collection Time: 11/16/17  4:28 AM   Result Value Ref Range    Sodium 140 136 - 145 mmol/L    Potassium 3 3 (L) 3 5 - 5 3 mmol/L    Chloride 102 100 - 108 mmol/L    CO2 28 21 - 32 mmol/L    Anion Gap 10 4 - 13 mmol/L    BUN 26 (H) 5 - 25 mg/dL    Creatinine 1 40 (H) 0 60 - 1 30 mg/dL    Glucose 276 (H) 65 - 140 mg/dL    Calcium 7 6 (L) 8 3 - 10 1 mg/dL    eGFR 32 ml/min/1 73sq m   Phenytoin level, total    Collection Time: 11/16/17  4:28 AM   Result Value Ref Range    Phenytoin Lvl 16 1 10 0 - 20 0 ug/mL   Manual Differential(PHLEBS Do Not Order)    Collection Time: 11/16/17  4:28 AM   Result Value Ref Range    Segmented % 83 (H) 43 - 75 %    Bands % 12 (H) 0 - 8 %    Lymphocytes % 3 (L) 14 - 44 %    Monocytes % 1 (L) 4 - 12 %    Eosinophils % 0 0 - 6 %    Basophils % 0 0 - 1 %    Myelocytes % 1 0 - 1 %    Absolute Neutrophils 28 57 (H) 1 85 - 7 62 Thousand/uL    Lymphocytes Absolute 0 90 0 60 - 4 47 Thousand/uL    Monocytes Absolute 0 30 0 00 - 1 22 Thousand/uL    Eosinophils Absolute 0 00 0 00 - 0 40 Thousand/uL    Basophils Absolute 0 00 0 00 - 0 10 Thousand/uL    Total Counted      RBC Morphology Present     Anisocytosis Present     Bristol Cells Present     Microcytes Present     Ovalocytes Present     Poikilocytes Present     Platelet Estimate Adequate Adequate   Fingerstick Glucose (POCT)    Collection Time: 11/16/17  8:48 AM   Result Value Ref Range    POC Glucose 306 (H) 65 - 140 mg/dl     Imaging   No new neuro imaging available for review  VTE Prophylaxis: Heparin    Counseling / Coordination of Care  Total Critical Care time spent 22 minutes excluding procedures, teaching and family updates

## 2017-11-16 NOTE — CONSULTS
Consultation - Palliative and Supportive Care   Afshan Duran 80 y o  female [id number]    Patient Active Problem List   Diagnosis    Essential hypertension    Elevated troponin    Traumatic r[facility]domyolysis ([facility]    UTI (urinary tract infection)    Fall at home    Diabetes mellitus type 2, controlled ([facility] )    Acute renal failure superimposed on stage 3 chronic kidney disease (HCC)    Lactic acidosis    Dementia    Dyspnea    Pulmonary edema    Acute CHF (congestive heart failure) (HCC)    CKD (chronic kidney disease) stage 3, GFR 30-59 ml/min    SIRS (systemic inflammatory response syndrome) ([facility]    Accelerated hypertension    Subdural hematoma (HCC)    Multiple falls    History of CHF (congestive heart failure)    Acute-on-chronic kidney injury ([facility]    Dementia    Seizure ([facility])    Altered mental status    Subdural hematoma, chronic ([facility] )      Plan:  1  Symptom management - NCSE, hypoactive delirium, presume headache, fever   - Defer symptom management and seizure management to ICU team at this time, given family request for stability  2  Goals - transition to comfort cares   - Pt incompetent  Son Edna Donald is sole PoA, and has expressed an interest in transition towards comfort measures  He has requested no invasive measures, but is not ready for comfort cares only just yet  - Pt's very restrictive insurance product completely disallows inpatient hospice care  We have been given a number for Rg Jarvis () at [employer] to discuss comfort cares in hospital, which are medically indicated and cannot be accomplished in any other environment, given fevers and seizures  Code Status: DNR/DNI - Level 3   Power of :  Grabiel Donald has full PoA for healthcare decisions and finances   Advance Directive / Living Will: reviewed and informs our documentation and plan   POLST:  none         We appreciate the invitation to be involved in this patient's care    We will continue to follow to guide cares and promote comfort  Please do not hesitate to reach our on call provider through our clinic answering service at  should you have acute symptom control concerns  Poncho Henriquez MD  Palliative and Supportive Care  Pager: [phone]       IDENTIFICATION:  Consults  Physician Requesting Consult: Lidia Russo MD  Reason for Consult / Principal Problem: goals  Hx and PE limited by: pt's altered mental status    HISTORY OF PRESENT ILLNESS:       Rosetta Medina is a 80 y o  female who presents with seizure  She [facility] after several falls at [facility]  Most recent scans and studies show that she has a known head bleed, which -- during this stay -- paradoxically seems improved in size, despite her worsening neurologic status  The seizure was witnessed, and appeared to be a GTC event  EMS was called by son Nathalie Tomas, not the SNF, and BZDs were administered which broke the motor activity  In the ED, Nathalie Tomas was very clear that despite her poor GCS, no invasive airway protection would be used, and no resuscitation efforts should be undertaken  She was then brought to ICU for neurocritical care and monitoring  At the time of our consul, the pt was relatively stable on room air, non-convuslive outwardly, and non-interactive  In conversation with Nathalie Tomas, we find that he has been wrangling with the onerous insurance plan that provides the pt's care, and he is thankful for the attention of the [facility] teams  He expresses interest in both high-quality care and comfort; he is a bit conflicted as to which mode of care he overall wishes for  We shared our understanding of the limits of [employer] insurance product: specifically, if hospice benefit is elected, none of those cares will be covered except at [facility] facility, with a hospice organization of [employer]'s choosing    Alternatively, comfort measures in hospital could be provided, espeically given the ongoing need to use parenteral meds to control seizure  Finally, curative/restorative measures could be elected, which require ongoing hospitalization for expert management and monitoring  For the moment, Michelle Dumas has elected the third option, with the clear limitations of:   - No escalation in cares: no pressors, no invasive vent   - NO CODE   - No artificial feeding, but IVF, ABx, and antiepileptics are appropriate and desired by Michelle Dumas  Review of Systems   Unable to perform ROS: acuity of condition       Past Medical History:   Diagnosis Date    [facility]     Chronic pain     Dementia     Diabetes mellitus ([facility] )     Hypertension      Past Surgical History:   Procedure Laterality Date    HYSTERECTOMY       Social History     Social History    Marital status:      Spouse name: N/A    Number of children: N/A    Years of education: N/A     Occupational History    Not on file  Social History Main Topics    Smoking status: Former Smoker     Quit date: 1947    Smokeless tobacco: Never Used      Comment: patient no longer a smoker    Alcohol use Yes      Comment: occassionaly wine drinker    Drug use: No    Sexual activity: Not on file     Other Topics Concern    Not on file     Social History Narrative    No narrative on file     Family History   Problem Relation Age of Onset    Family history unknown: Yes       MEDICATIONS / ALLERGIES:    all current active meds have been reviewed    No Known Allergies    OBJECTIVE:    Physical Exam  Physical Exam   Constitutional: She appears distressed (appears to have pain, but not convulsing)  frail   HENT:   Head: Normocephalic  Right Ear: External ear normal    Left Ear: External ear normal    Mouth/Throat: No oropharyngeal exudate (membranes tacky)  Stigmata of head trama persist from recent fall   Eyes: Right eye exhibits no discharge  Left eye exhibits no discharge  Does not open eyes during encounter     Neck: No tracheal deviation present  Cardiovascular:   tachy   Pulmonary/Chest: No stridor  No respiratory distress  She has no wheezes  Slightly tachypneic   Abdominal: Soft  She exhibits no distension  Musculoskeletal: She exhibits no edema  Neurological:   Not alert, not interactive  Facies generally symmetric  Skin: Skin is warm and dry  No rash noted  She is not diaphoretic  No erythema  There is pallor  Psychiatric:   Cannot participate in exam today  Lab Results: I have personally reviewed pertinent labs  Imaging Studies: reviewed imaging reports of head from this stay and last  EKG, Pathology, and Other Studies: none yet; EMU interp pending    Counseling / Coordination of Care  Total floor / unit time spent today 80+ minutes  Greater than 50% of total time was spent with the patient and / or family counseling and / or coordination of care  A description of the counseling / coordination of care: extensive counseling of family; bedside coordinatin with attending MD KAITLIN Pollack, ANICETO/CM team; review and assessment of decisional apparatus and capacity, applicable legal codes and extant documents;

## 2017-11-16 NOTE — SOCIAL WORK
Met with patient's son to provide emotional support  Son has experienced multiple incidents of loss previously (sister passed as a small child, very close co-worker passed within one week of dad, dad passed 2013)  Patient has little family in the area  Previous to living at [facility] patient was living with sister at home and receiving services from the community  Sister moved into an assisted living  Patient stayed home with regular checks from son and [facility] services  The incident that lead to [facility] placement was last year patient had a fall and resided on the floor for approximately 20 hours (per son)  Son feels that a return to outpatient mental health to address issues of grief and loss is important  Son will also need support through his mother's current admission

## 2017-11-16 NOTE — PROGRESS NOTES
11/16/17 Manolo Quinonez 124   Psychosocial   Patient Behaviors/Mood Unable to assess   Length of Time/Family Visitation 6-15 min   Plan of Care   Comments PT sleeping (extubated)  Prayers for her     Assessment Completed by: Unit visit

## 2017-11-17 PROBLEM — E87.6 HYPOKALEMIA: Status: ACTIVE | Noted: 2017-01-01

## 2017-11-17 PROBLEM — G93.40 ACUTE ENCEPHALOPATHY: Status: ACTIVE | Noted: 2017-01-01

## 2017-11-17 NOTE — PROGRESS NOTES
Progress Note - Critical Care   Alonzo Carrasco 80 y o  female MRN: [id number]  Unit/Bed#: ICU 6 Encounter: [id number]    Assessment:     79 yo F w PMH HTN, DM, CHF, chronic pain, dementia who was recently d/c to SNF following admission for SDH  No returns to ICU in setting seizure activity noted at rehab  Maintained in ICU for neuro checks / cEEG  Need to clarify goals of care  Principal Problem:    Seizure ([address] )  Active Problems:    Altered mental status    Subdural hematoma, chronic (HCC)    Acute encephalopathy    Hypokalemia      Plan:          Neuro:    subfalcine SDH w seizure activity - q4 neuro checks / keppra and dilantin as per neurology, cEEG (no seizure since 2PM yesterday)   Acute pain - fentanyl 25mcg Q3 PRN (1/24)   Tylenol 325 mg Q4 PRN (2/24)   Acute encephalopathy - reorient as able, promote sleep wake cycle    Dementia - aricept                   CV:    Continue close hemodynamic monitoring    CHF - home dose lasix 40mg QD and zaroxolyn 2 5mg QD   AF - AC held in setting SDH / metoprolol tartrate at home, consider MR brain for embolic stroke, rate control appropriate currently w metoprolol held while critically ill     HTN - lopressor 5mg Q6 PRN, hydralazine 5mg Q6 PRN                  Lung:    supplemental O2 PRN    Unable to comply w IS / flutter given mental status                  GI:    No BM while here - PRN dulcolax                  FEN:   Severe hypokalemia - replete / check Mg    Fluid - mIVF isolyte 100/hr    Nutrition - NPO / avoid artificial feeds as per son and POA Douglas                  :    Jermaine barrios - monitor I/Os                 ID:    SIRS response / rising leukocytosis / febrile - zosyn / vanc # 3, vanc trough f/u this morning 10:30   Flu vaccine                  Heme:    ppx - SQH   Anemia - monitor / trend / no active source bleeding                   Endo:    DM - increased SSI Alg from 4 - 5 yesterday in setting BG > 300 w subsequent improvement Msk/Skin:    Repetitive repositioning and off-loading to prevent skin break down and ulcerative formation   Multiple venous stasis wounds / skin breakdown mostly of LE - wound care consult                  Disposition:    SD 1    Level 3 DNI / DNR - however we will not escalate care, will avoid pressors / BIPAP, antiepileptic agents / fluids / abx to continue as per palliative care discussion w family    We will continue to discuss pt condition with family and involve son in care and decision making     Chief Complaint: Unable to provide 2/2 acuity of condition     HPI/24hr events: received 1 dose 20 lasix for low UO of 30-45 / hr    Physical Exam: Physical Exam   Constitutional: She is oriented to person, place, and time  She appears well-developed and well-nourished  No distress  HENT:   Head: Normocephalic and atraumatic  Eyes: Pupils are equal, round, and reactive to light  Neck: Neck supple  No tracheal deviation present  Cardiovascular: Normal rate, regular rhythm, normal heart sounds and intact distal pulses  Exam reveals no gallop and no friction rub  No murmur heard  Pulmonary/Chest: Effort normal and breath sounds normal  No respiratory distress  She has no wheezes  She has no rales  4L NC    Abdominal: Soft  Bowel sounds are normal  She exhibits no distension and no mass  There is tenderness (diffuse but mild pain wo peritonitis or guarding )  There is no guarding  Genitourinary:   Genitourinary Comments: Dean w clear uo w uo 75 / hr    Musculoskeletal: She exhibits no edema or deformity  Neurological: She is alert and oriented to person, place, and time  [id number] E1 V5 M5  Told RN her birthdate  Comprehensible speech when she does talk although no real communication this morning  Localizes to pain    Skin: Skin is warm and dry  Capillary refill takes less than 2 seconds  She is not diaphoretic     Multiple LE wounds / dressed and dry   multipodus boots    Psychiatric: She has a normal mood and affect  Her behavior is normal    Nursing note and vitals reviewed  Vitals:    17 0800 17 0900 17 1000 17 1100   BP: 135/52 145/56 145/55 124/51   Pulse: 80 84 90 82   Resp: (!) 25 22 (!) 27 22   Temp: 98 2 °F (36 8 °C)      TempSrc:       SpO2: 99% 96% 100% 100%   Weight:       Height:                 Temperature:   Temp (24hrs), Av 3 °F (37 4 °C), Min:98 2 °F (36 8 °C), Max:100 5 °F (38 1 °C)    Current: Temperature: 98 2 °F (36 8 °C)    Weights:   IBW: 50 1 kg    Body mass index is 27 3 kg/m²  Weight (last 2 days)     None          Hemodynamic Monitoring:  N/A     Non-Invasive/Invasive Ventilation Settings:  Respiratory    Lab Data (Last 4 hours)    None         O2/Vent Data (Last 4 hours)    None              No results found for: PHART, RRZ2PPE, PO2ART, TJP4YIV, R7OZYSYC, BEART, SOURCE  SpO2: SpO2: 100 %    Intake and Outputs:  I/O       11/15 07 -  0700  07 -  0700    I V  (mL/kg) 2998 3 (44 3) 2283 3 (33 7)    IV Piggyback 950 350    Total Intake(mL/kg) 3948 3 (58 3) 2633 3 (38 9)    Urine (mL/kg/hr) 975 (0 6) 1125 (0 7)    Total Output 975 1125    Net +2973 3 +1508 3              UOP: 75/hour   Nutrition:        Diet Orders            Start     Ordered    17  Diet NPO  Diet effective now     Question Answer Comment   Diet Type NPO    RD to adjust diet per protocol?  Yes        17            Labs:     Results from last 7 days  Lab Units 17  0450 17  0428 11/15/17  0534  17   WBC Thousand/uL 34 65* 30 07* 28 10*  --  22 06*  < > 13 82*   HEMOGLOBIN g/dL 8 7* 8 2* 10 0*  --  10 5*  < > 9 3*   I STAT HEMOGLOBIN   --   --   --   < >  --   --   --    HEMATOCRIT % 25 8* 25 5* 30 2*  --  32 0*  < > 28 9*   PLATELETS Thousands/uL 395* 395* 515*  --  592*  < > 460*   NEUTROS PCT %  --   --   --   --  61  --  83*   MONOS PCT %  --   --   --   --  8  --  6   MONO PCT MAN % 2* 1* 4  --   --   --   -- < > = values in this interval not displayed  Results from last 7 days  Lab Units 11/17/17  0450 11/16/17  0428 11/15/17  0534  11/14/17  2021   SODIUM mmol/L 144 140 135*  --  133*   POTASSIUM mmol/L 2 9* 3 3* 3 6  --  3 6   CHLORIDE mmol/L 104 102 99*  --  97*   CO2 mmol/L 30 28 28  --  30   BUN mg/dL 35* 26* 24  --  28*   CREATININE mg/dL 1 45* 1 40* 1 25  --  1 51*   CALCIUM mg/dL 8 0* 7 6* 8 4  --  8 8   TOTAL PROTEIN g/dL  --   --   --   --  7 5   BILIRUBIN TOTAL mg/dL  --   --   --   --  0 26   ALK PHOS U/L  --   --   --   --  95   ALT U/L  --   --   --   --  11*   AST U/L  --   --   --   --  16   GLUCOSE RANDOM mg/dL 197* 276* 219*  --  312*   GLUCOSE, ISTAT   --   --   --   < >  --    < > = values in this interval not displayed  Results from last 7 days  Lab Units 11/17/17  0450   MAGNESIUM mg/dL 2 4            Results from last 7 days  Lab Units 11/12/17  1952   INR  1 00   PTT seconds 26       Results from last 7 days  Lab Units 11/15/17  1116   LACTIC ACID mmol/L 1 3       0  Lab Value Date/Time   TROPONINI 0 22 (H) 05/08/2017 0525   TROPONINI 0 27 (H) 05/08/2017 0229   TROPONINI 11 70 (H) 05/03/2017 0840   TROPONINI 12 68 (H) 05/03/2017 0111   TROPONINI 12 75 (H) 05/02/2017 2142   TROPONINI 9 83 (H) 05/02/2017 1718       Imaging:   XR chest 1 view   Final Result      Left perihilar and bilateral basilar opacities  Findings suggest effusions and airspace disease  Mild central vascular congestion  Workstation performed: [id number]         XR chest portable   Final Result      Left lower lobe alveolar opacity attributed to pneumonia and/or aspiration  Additional chronic findings unchanged  Workstation performed: [id number]         CT head without contrast   Final Result      Decreased size of the known tiny anterior subfalcine subdural hematoma  No new findings  Workstation performed: [id number]            I have personally reviewed pertinent reports     and I have personally reviewed pertinent films in PACS    EKG: as per tele AF w rate control    Micro:  Lab Results   Component Value Date    BLOODCX No Growth at 24 hrs  11/15/2017    BLOODCX No Growth at 24 hrs  11/15/2017    BLOODCX No Growth After 4 Days  11/12/2017    URINECX No Growth <1000 cfu/mL 05/08/2017    URINECX 10,000-19,000 cfu/ml Escherichia coli 05/03/2017    URINECX 30,000-39,000 cfu/ml Mixed Contaminants X3 05/03/2017       Allergies: No Known Allergies    Medications:   Scheduled Meds:    heparin (porcine) 5,000 Units Subcutaneous Q8H [address]   insulin lispro 4-20 Units Subcutaneous Q6H TOLU   levETIRAcetam 750 mg Intravenous Q12H [address]   phenytoin 100 mg Intravenous Q12H [address]   piperacillin-tazobactam 2 25 g Intravenous Q6H   potassium chloride 20 mEq Intravenous Once   potassium chloride 20 mEq Intravenous Once     Continuous Infusions:    multi-electrolyte 100 mL/hr Last Rate: 100 mL/hr (11/17/17 0414)     PRN Meds:    acetaminophen 325 mg Q4H PRN   bisacodyl 10 mg Daily PRN   fentanyl citrate (PF) 25 mcg Q3H PRN   hydrALAZINE 5 mg Q6H PRN   influenza vaccine 0 5 mL Prior to discharge   metoprolol 5 mg Q6H PRN       VTE Pharmacologic Prophylaxis: Sequential compression device (Venodyne)  and Heparin  VTE Mechanical Prophylaxis: sequential compression device    Invasive lines and devices: Invasive Devices     Peripheral Intravenous Line            Peripheral IV 11/15/17 Right Forearm 2 days    Peripheral IV 11/15/17 Left Arm 1 day          Drain            Urethral Catheter Straight-tip 16 Fr  2 days                   Counseling / Coordination of Care  Total Critical Care time spent 25 minutes excluding procedures, teaching and family updates  Code Status: Level 3 - DNAR and DNI     Portions of the record may have been created with voice recognition software  Occasional wrong word or "sound a like" substitutions may have occurred due to the inherent limitations of voice recognition software    Read the chart carefully and recognize, using context, where substitutions have occurred       Carola Hutchison PA-C

## 2017-11-17 NOTE — PROGRESS NOTES
Progress note - Palliative and Supportive Care   Arron Jaramillo 80 y o  female [id number]    Patient Active Problem List   Diagnosis    Essential hypertension    Elevated troponin    Tra[facility]dom[facility]olysis [facility]    UTI (urinary tract infection)    Fall at home    Diabetes mellitus type 2, controlled ([facility] )    Acute renal failure superimposed on stage 3 chronic kidney disease (HCC)    Lactic acidosis    Dementia    Dyspnea    Pulmonary edema    Acute CHF (congestive heart failure) (HCC)    CKD (chronic kidney disease) stage 3, GFR 30-59 ml/min    SIRS (systemic inflammatory response syndrome) (HCC)    Accelerated hypertension    Subdural hematoma (HCC)    Multiple falls    History of CHF (congestive heart failure)    Acute-on-chronic kidney injury ([facility] )    Dementia    Seizure ([facility])    Altered mental status    Subdural hematoma, chronic ([facility] )      Plan:  1  Symptom management - none endorsed; pt's seizure control has been improved  - Defer seizure control meds to ICU team, Neurology for now    2  Goals - limited cares, promote comfort   - Pt incompetent  Grabiel Day leads decisional apparatus  - Pt not stable for discharge; we have communicated this to [employer] insurance rep Romi Roya has requested ongoing limited cares of medical nature to promote comfort, but not prolong dying process:    = DNR/DNI    = No pressors, no escalation, no biPAP    = HFNC if needed for symptom control    = continued IV meds for seizure control, antibiosis, fluids as needed  = No artificial feeds    = Continue EMU monitoring as per Neuro     Code Status: DNR/DNI - Level 3   Power of : noted to be grabiel Day by Toni Mendoza   Advance Directive / Living Will: reviewed and informs our plan   POLST:  None    Sarah Domínguez MD  Palliative and Supportive Care  Pager: [phone]       Interval history:       Since last visit, more aggressive AEDs from Neuro/ICU teams have improved seizure control  Pt was reported to say a few words appropraitely to her son today  She is otherwise not much improved from yesterday, and still has not taken any PO  Son again identifies that he would not wish for aggressive measures, and he wishes to continue aggressive seizure control with hospital level cares  He trusts the recs from Neuro as re: seizure prophy, and is agreeable to non-invasive monitoring for this problem  MEDICATIONS / ALLERGIES:    current meds:   Current Facility-Administered Medications   Medication Dose Route Frequency    acetaminophen (TYLENOL) rectal suppository 325 mg  325 mg Rectal Q4H PRN    fentanyl citrate (PF) 100 MCG/2ML 25 mcg  25 mcg Intravenous Q3H PRN    heparin (porcine) subcutaneous injection 5,000 Units  5,000 Units Subcutaneous Q8H [address]    hydrALAZINE (APRESOLINE) injection 5 mg  5 mg Intravenous Q6H PRN    influenza inactivated quadrivalent vaccine (FLULAVAL) IM injection 0 5 mL  0 5 mL Intramuscular Prior to discharge    insulin lispro (HumaLOG) 100 units/mL subcutaneous injection 4-20 Units  4-20 Units Subcutaneous Q6H [address]    levETIRAcetam (KEPPRA) 750 mg in sodium chloride 0 9 % 100 mL IVPB  750 mg Intravenous Q12H [address]    metoprolol (LOPRESSOR) injection 5 mg  5 mg Intravenous Q6H PRN    multi-electrolyte (ISOLYTE-S PH 7 4 equivalent) IV solution  100 mL/hr Intravenous Continuous    phenytoin (DILANTIN) injection 100 mg  100 mg Intravenous Q12H [address]    piperacillin-tazobactam (ZOSYN) 2 25 g in sodium chloride 0 9 % 50 mL IVPB  2 25 g Intravenous Q6H    vancomycin (VANCOCIN) 1,250 mg in sodium chloride 0 9 % 250 mL IVPB  17 5 mg/kg Intravenous Q24H       No Known Allergies    OBJECTIVE:    Physical Exam  Physical Exam   Constitutional: No distress (improved from yesterday)  Frail, cachectic   HENT:   Head: Normocephalic  Right Ear: External ear normal    Left Ear: External ear normal    Mouth/Throat: No oropharyngeal exudate (membranes tacky)     Eyes: Right eye exhibits no discharge  Left eye exhibits no discharge  Does not open eyes during encounter  Neck: No tracheal deviation present  Cardiovascular: Normal rate and regular rhythm  Pulmonary/Chest: Effort normal  No stridor  No respiratory distress  Abdominal: Soft  She exhibits no distension  There is no tenderness  Musculoskeletal: She exhibits no edema  Neurological:   Not alert, not interactive  Facies generally symmetric  Skin: Skin is warm and dry  No rash noted  She is not diaphoretic  No erythema  There is pallor  Psychiatric:   Cannot participate in exam today  Lab Results:   I have personally reviewed pertinent labs  , CBC:   Lab Results   Component Value Date    WBC 30 07 (HH) 11/16/2017    HGB 8 2 (L) 11/16/2017    HCT 25 5 (L) 11/16/2017    MCV 80 (L) 11/16/2017     (H) 11/16/2017    MCH 25 8 (L) 11/16/2017    MCHC 32 2 11/16/2017    RDW 16 6 (H) 11/16/2017    MPV 10 1 11/16/2017    NRBC 0 11/16/2017   , CMP:   Lab Results   Component Value Date     11/16/2017    K 3 3 (L) 11/16/2017     11/16/2017    CO2 28 11/16/2017    ANIONGAP 10 11/16/2017    BUN 26 (H) 11/16/2017    CREATININE 1 40 (H) 11/16/2017    GLUCOSE 276 (H) 11/16/2017    CALCIUM 7 6 (L) 11/16/2017    EGFR 32 11/16/2017     Imaging Studies: none new  EKG, Pathology, and Other Studies: none new; formal EMU pending    Counseling / Coordination of Care  Total floor / unit time spent today 35+ minutes  Greater than 50% of total time was spent with the patient and / or family counseling and / or coordination of care  A description of the counseling / coordination of care: visit to bedside to  family; long phone call placed to [employer] twice today with no success (over 10 minutes spent on hold waitintg to call report); coordination with CM/SW on Neurorehab floor

## 2017-11-17 NOTE — PROGRESS NOTES
Progress Note - ICU Transfer to Step down/med  surg  Diane Mcmullen 80 y o  female MRN: [id number]    Unit/Bed#: ICU 11 Encounter: [id number]      Code Status: Level 4 - Comfort Care    Date of ICU admission: 11/14/17     Reason for ICU adm:     Active problems:   Patient Active Problem List   Diagnosis    Essential hypertension    Elevated troponin    [facility]dom[facility]olysis [facility] )    UTI (urinary tract infection)    Fall at home    [facility]    Acute renal failure superimposed on stage 3 chronic kidney disease (HCC)    Lactic acidosis    Dementia    Dyspnea    Pulmonary edema    Acute CHF (congestive heart failure) (HCC)    CKD (chronic kidney disease) stage 3, GFR 30-59 ml/min    SIRS (systemic inflammatory response syndrome) ([facility]    Accelerated hypertension    Subdural hematoma (HCC)    Multiple falls    History of CHF (congestive heart failure)    Acute-on-chronic kidney injury ([facility] )    Dementia    Seizure (HCC)    Altered mental status    Subdural hematoma, chronic ([facility])    Acute encephalopathy    Hypokalemia       Summary of clinical course: Pt initially admitted to [facility] ICU for SDH following fall  She was discharged to [facility] where she suffered witnessed seizure and sent back to [facility] for further monitoring  Maintained in ICU for cEEG and antiepileptics continued  Per discussion w palliative and patients son pt to be made comfort care  Antiepileptics to remain       Recent or scheduled procedures:     none    Outstanding/pending diagnostics:     none    Hospital discharge planning:      Palliative consult   Case management plan for placement / hospice as pt insurance does not cover hospice house

## 2017-11-17 NOTE — SOCIAL WORK
Met with son for emotional support  Son states that he feels that he has emotional support at work, friends, and [employer]  Per patient, he continues to struggle at times regarding his mother's current status  That during these times of struggle he utilizes prayer to help him through  Patient and LSW discussed his Buddhist beliefs and being willing to listen  Son is very grateful for the support and great care he feels that the hospital staff is providing his mother and himself  Son is looking forward to moving to a floor  Son will request palliative support as he needs

## 2017-11-17 NOTE — PROGRESS NOTES
11/17/17 [address] Religion   Current Che[employer]ism Involvement Patient active with [employer]ism active in [address] Aware/Contacted Leader/[employer] aware of patient's status   Spiritual Beliefs/Perceptions   Concept of God Accepting   God's Role in Disease Natural   Support Systems Children   Psychosocial   Patient Behaviors/Mood Anxious; Appropriate for situation   Length of Time/Family Visitation 31 min -1hr   Stress Factors   Patient Stress Factors Health changes   Coping Responses   Patient Coping Anxiety   Family Coping Open/discussion; Sadness   Plan of Care   Comments PT uncomfortable  Provided anticipatory guidance to son, Provided silent empathy and prayer for PT and son     Assessment Completed by: Unit visit

## 2017-11-17 NOTE — PROCEDURES
Continuous Video EEG  Long Term Monitoring    Patient Name:  Patrick Cuevas  MRN: [id number]   :  1920 File #: Shiloh Galarza    Age: 80 y o  Encounter #: [id number]   Date performed: -2017 Referring Provider: Brinda Stuart DO          Report date: 2017          Study type: Continuous video EEG, up to 24 hours    ICD 10 diagnosis: Spells/Fit NOS R56 9 and Encephalopathy, unspecified G93 40    Start time: 2017 08:01  End time: 2017 08:00    Patient History:  Patient is 80 y o  female on continuous video EEG monitoring for the assessment of seizures, characterization of events, and effect of treatment  Patient had a traumatic head injury with a subfalcine subdural hematoma, subsequently later had a seizure, found to be encephalopathic  Continuous video EEG monitoring captured recurrent electrographic seizures over the right temporal region  Current AEDs:  Medications include:   insulin lispro 4-20 Units Subcutaneous BID   levETIRAcetam 750 mg Intravenous Q12H [address]   phenytoin 100 mg Intravenous Q12H [address]       Description of Procedure:   A 24 hours continuous video EEG was performed with electrodes applied using the International 10-20 System at least 16 channels are reviewed and formatted into longitudinal bipolar, transverse bipolar, and referential (to common reference or calculated common reference) montages  Additional electrodes used included T1, T2, and extraocular electrodes, and ECG, along with video recording  The EEG was recorded with the patient encephalopathic state  A monitoring technologist supervised the continuous recording  The recording was technically satisfactory  Findings:   Background Activity: The background is asymmetric due to slower activity over the right hemisphere    The background consists of diffuse low voltage beta-alpha activity with voltages slightly higher over the posterior region and intermittent low-moderate voltage polymorphic 2 Hz delta activity  The right hemisphere consist of nearly continuous to intermittent low-moderate voltage polymorphic 1-2 Hz delta activity with quasi-periodic lateralized epileptiform discharges maximal over C4-T4 and P4-T6  Sleep is characterized by attenuation of alpha and beta activity, diffuse theta activity is present along with fragmentary sleep spindles  Other findings: The single lead ECG shows a irregular cardiac rhythm  Events: There are no patient push button events  Interpretation: This is an abnormal 24 hours continuous video EEG recording diffuse background slowing, right hemispheric slowing with quasi-periodic lateralized epileptiform discharges (quasi-PLEDs)  These findings indicate a highly epileptogenic structural focus in the right hemisphere maximal over the centro-parieto-temporal region superimposed over mild-moderate diffuse cerebral dysfunction  There are no electrographic seizures during this monitored period      Aamir Lantigua MD  [facility]  [address] [place] [address]

## 2017-11-17 NOTE — PROGRESS NOTES
Progress note - Palliative and Supportive Care   Julianna January 80 y o  female [id number]    Patient Active Problem List   Diagnosis    Essential hypertension    Elevated troponin    Tra[facility] [address] [facility]    UTI (urinary tract infection)    Fall at home    Diabetes mellitus type 2, controlled ([facility]    Acute renal failure superimposed on stage 3 chronic kidney disease (HCC)    Lactic acidosis    Dementia    Dyspnea    Pulmonary edema    [facility]) [facility]    CKD (chronic kidney disease) stage 3, GFR 30-59 ml/min    SIRS (systemic inflammatory response syndrome) ([facility]    Accelerated hypertension    Subdural hematoma ([facility])    Multiple falls    History of CHF (congestive heart failure)    Acute-on-chronic kidney injury ([facility]    Dementia    Seizure ([facility])    Altered mental status    Subdural hematoma, chronic ([facility]    Acute encephalopathy    Hypokalemia      Plan:  1  Symptom management - none endorsed; pt's seizure control has been improved  - Defer seizure control meds to ICU team, Neurology for now    2  Goals - limited cares, promote comfort   - Pt incompetent  Grabiel Barrera leads decisional apparatus  - Pt not stable for discharge; we have communicated this to [employer] insurance rep HESIODO Brian and RN Rebekah Reynoso at [employer]  - Gita Barrera has requested ongoing limited cares of medical nature to promote comfort, but not prolong dying process:    = DNR/DNI    = No pressors, no escalation, no biPAP, no HFNC    = No IVF, no antibiosis    = continued IV meds for seizure control    = No artificial feeds     Code Status: DNR/DNI - Level 4   Power of : noted to be grabiel Barrera by [employer]   Advance Directive / Living Will: reviewed and informs our plan   POLST:  None    Bernardino Daniel MD  Palliative and Supportive Care  Pager: [phone]       Interval history:   No further sz are noted, and she has become a bit more alert today    While lytes are attempting to be corrected, she has become upset, calling out in pain  Son at bedside, requesting that we allow her to get care for comfort alone  He requests no further IV meds, except as needed to control seizure and improve comfort  Her PO has been poor, and she is not reliably taking anything by mouth  Her alertness is reduced, and her speech quality is abnormal   She is a high aspn risk  As such, pt's son is agreeable to ongoing hospital stay for IV medications to control persistent seizure activity noted yesterday on monitoring        MEDICATIONS / ALLERGIES:    current meds:   Current Facility-Administered Medications   Medication Dose Route Frequency    acetaminophen (TYLENOL) rectal suppository 325 mg  325 mg Rectal Q4H PRN    bisacodyl (DULCOLAX) rectal suppository 10 mg  10 mg Rectal Daily PRN    fentanyl citrate (PF) 100 MCG/2ML 50 mcg  50 mcg Intravenous Q1H PRN    heparin (porcine) subcutaneous injection 5,000 Units  5,000 Units Subcutaneous Q8H [address]    hydrALAZINE (APRESOLINE) injection 5 mg  5 mg Intravenous Q6H PRN    influenza inactivated quadrivalent vaccine (FLULAVAL) IM injection 0 5 mL  0 5 mL Intramuscular Prior to discharge    insulin lispro (HumaLOG) 100 units/mL subcutaneous injection 4-20 Units  4-20 Units Subcutaneous Q6H [address]    levETIRAcetam (KEPPRA) 750 mg in sodium chloride 0 9 % 100 mL IVPB  750 mg Intravenous Q12H [address]    metoprolol (LOPRESSOR) injection 5 mg  5 mg Intravenous Q6H PRN    multi-electrolyte (ISOLYTE-S PH 7 4 equivalent) IV solution  100 mL/hr Intravenous Continuous    phenytoin (DILANTIN) injection 100 mg  100 mg Intravenous Q12H [address]    piperacillin-tazobactam (ZOSYN) 2 25 g in sodium chloride 0 9 % 50 mL IVPB  2 25 g Intravenous Q6H    potassium chloride 20 mEq IVPB (premix)  20 mEq Intravenous Once    potassium chloride 20 mEq IVPB (premix)  20 mEq Intravenous Once       No Known Allergies    OBJECTIVE:    Physical Exam  Physical Exam   Constitutional: She appears distressed (calling out in pain today)  Frail, cachectic   HENT:   Head: Normocephalic  Right Ear: External ear normal    Left Ear: External ear normal    Mouth/Throat: No oropharyngeal exudate (membranes tacky)  Eyes: Right eye exhibits no discharge  Left eye exhibits no discharge  Does not open eyes during encounter  Neck: No tracheal deviation present  Cardiovascular:   tachy   Pulmonary/Chest: No stridor  No respiratory distress  Abdominal: Soft  She exhibits no distension  There is no tenderness  scaphoid   Musculoskeletal: She exhibits no edema or deformity  Neurological:   Not alert, not interactive  Facies generally symmetric  Skin: Skin is warm and dry  No rash noted  She is not diaphoretic  No erythema  There is pallor  Psychiatric:   Cannot participate in exam today  Lab Results:   I have personally reviewed pertinent labs  , CBC:   Lab Results   Component Value Date    WBC 34 65 (HH) 11/17/2017    HGB 8 7 (L) 11/17/2017    HCT 25 8 (L) 11/17/2017    MCV 80 (L) 11/17/2017     (H) 11/17/2017    MCH 27 1 11/17/2017    MCHC 33 7 11/17/2017    RDW 16 8 (H) 11/17/2017    MPV 10 1 11/17/2017    NRBC 0 11/17/2017   , CMP:   Lab Results   Component Value Date     11/17/2017    K 2 9 (L) 11/17/2017     11/17/2017    CO2 30 11/17/2017    ANIONGAP 10 11/17/2017    BUN 35 (H) 11/17/2017    CREATININE 1 45 (H) 11/17/2017    GLUCOSE 197 (H) 11/17/2017    CALCIUM 8 0 (L) 11/17/2017    EGFR 30 11/17/2017     Imaging Studies: none new  EKG, Pathology, and Other Studies: first EMU recording reports as onoging nonconvulsive seizure activity, likely from a 'highly epileptogenic structural lesion'  Counseling / Coordination of Care  Total floor / unit time spent today 35+ minutes  Greater than 50% of total time was spent with the patient and / or family counseling and / or coordination of care   A description of the counseling / coordination of care: visit to bedside to  family; long phone call placed to [employer] product team; coordination with RN and attending Trauma team LAUREN Morris

## 2017-11-18 NOTE — PROGRESS NOTES
Progress Note - Critical Care   Dane Sic 80 y o  female MRN: [id number]  Unit/Bed#: ICU 11 Encounter: [id number]    Attending Physician: Della oJrgensen MD      ______________________________________________________________________  Assessment and Plan:   Principal Problem:    Seizure [facility])  Active Problems:    Altered mental status    Subdural hematoma, chronic ([address] )    Acute encephalopathy    Hypokalemia  Resolved Problems:    * No resolved hospital problems  *    79 y/o female with recent SDH/TBI with new onset seizures at nursing home with patient made comfort care yesterday    Neuro:   SDH/TBI- no further intervention or neuro checks    Seizures- continue IV Keppra and Dilantin to control seizures  No seizure activity on cEEG monitoring  Acute metabolic encephalopathy- multifactorial, no further neuro checks or work up    CV: No active issues    Pulm: Tachypnea/high aspiration risk- continue diet for comfort, no further work up at this time  IV fentanyl as needed for comfort/air hunger  GI:   Diet for comfort    :   Dean for comfort    Disposition: Transfer for med surg on level IV comfort care status    Code Status: Level 4 - Comfort Care    Counseling / Coordination of Care  Total time spent today 10 minutes  Greater than 50% of total time was spent with the patient and / or family counseling and / or coordination of care  A description of the counseling / coordination of care: d/w nurse plan  ______________________________________________________________________    Chief Complaint: "Why am I here?"    24 Hour Events: No further seizure activity on EMU  Leads removed   Patient transitioned to comfort care yesterday by son after further discussions with palliative care        ______________________________________________________________________    Physical Exam:     GEN: NAD  HEENT: NCAT  NEURO: Alert, confused, moving all extremities  CV: irregularly irregular  PULM: CTA bilaterally  GI: soft, non-tender  : tyson in place  MSK: trace edema  SKIN: pink, warm, dry    ______________________________________________________________________  Vitals:    17 1100 17 1200 17 1600 17   BP: 124/51 116/81     Pulse: 82 96 86 80   Resp: 22 (!) 38 (!) 23 22   Temp:    97 9 °F (36 6 °C)   TempSrc:    Axillary   SpO2: 100% 100% 100% 100%   Weight:       Height:           Temperature:   Temp (24hrs), Av 9 °F (36 6 °C), Min:97 9 °F (36 6 °C), Max:97 9 °F (36 6 °C)    Current Temperature: 97 9 °F (36 6 °C)  Weights:   IBW: 50 1 kg    Body mass index is 27 3 kg/m²  Weight (last 2 days)     None        Hemodynamic Monitoring:  N/A     Non-Invasive/Invasive Ventilation Settings:  Respiratory    Lab Data (Last 4 hours)    None         O2/Vent Data (Last 4 hours)    None              No results found for: PHART, MPW3YQJ, PO2ART, SHW9GAL, U8UGZAIB, BEART, SOURCE  SpO2: SpO2: 100 %, SpO2 Device: O2 Device: Nasal cannula  Intake and Outputs:  I/O        07 -  0700  0701 -  0700  07 -  0700    I V  (mL/kg) 2283 3 (33 7)      IV Piggyback 350 400     Total Intake(mL/kg) 2633 3 (38 9) 400 (5 9)     Urine (mL/kg/hr) 1125 (0 7) 1375 (0 8)     Total Output 1125 1375      Net +1508 3 -975                 Nutrition:        Diet Orders            Start     Ordered    17 1211  Diet Regular; Regular House  Diet effective now     Comments:  PO ad abbe for comfort  Question Answer Comment   Diet Type Regular    Regular Regular House    RD to adjust diet per protocol?  No        17 1218          Labs:     Results from last 7 days  Lab Units 17  0450 17  0428 11/15/17  0534  17   WBC Thousand/uL 34 65* 30 07* 28 10*  --  22 06*  < > 13 82*   HEMOGLOBIN g/dL 8 7* 8 2* 10 0*  --  10 5*  < > 9 3*   I STAT HEMOGLOBIN   --   --   --   < >  --   --   --    HEMATOCRIT % 25 8* 25 5* 30 2*  --  32 0*  < > 28 9*   PLATELETS Thousands/uL 395* 395* 515*  --  592*  < > 460*   NEUTROS PCT %  --   --   --   --  61  --  83*   MONOS PCT %  --   --   --   --  8  --  6   MONO PCT MAN % 2* 1* 4  --   --   --   --    < > = values in this interval not displayed  Results from last 7 days  Lab Units 11/17/17  0450 11/16/17  0428 11/15/17  0534  11/14/17  2021   SODIUM mmol/L 144 140 135*  --  133*   POTASSIUM mmol/L 2 9* 3 3* 3 6  --  3 6   CHLORIDE mmol/L 104 102 99*  --  97*   CO2 mmol/L 30 28 28  --  30   BUN mg/dL 35* 26* 24  --  28*   CREATININE mg/dL 1 45* 1 40* 1 25  --  1 51*   CALCIUM mg/dL 8 0* 7 6* 8 4  --  8 8   TOTAL PROTEIN g/dL  --   --   --   --  7 5   BILIRUBIN TOTAL mg/dL  --   --   --   --  0 26   ALK PHOS U/L  --   --   --   --  95   ALT U/L  --   --   --   --  11*   AST U/L  --   --   --   --  16   GLUCOSE RANDOM mg/dL 197* 276* 219*  --  312*   GLUCOSE, ISTAT   --   --   --   < >  --    < > = values in this interval not displayed  Results from last 7 days  Lab Units 11/17/17  0450   MAGNESIUM mg/dL 2 4     Lab Results   Component Value Date    PHOS 3 4 05/03/2017        Results from last 7 days  Lab Units 11/12/17  1952   INR  1 00   PTT seconds 26       0  Lab Value Date/Time   TROPONINI 0 22 (H) 05/08/2017 0525   TROPONINI 0 27 (H) 05/08/2017 0229   TROPONINI 11 70 (H) 05/03/2017 0840   TROPONINI 12 68 (H) 05/03/2017 0111   TROPONINI 12 75 (H) 05/02/2017 2142   TROPONINI 9 83 (H) 05/02/2017 1718       Results from last 7 days  Lab Units 11/15/17  1116 11/15/17  0534 11/15/17  0048   LACTIC ACID mmol/L 1 3 2 3* 1 7     ABG:  Micro:  Lab Results   Component Value Date    BLOODCX No Growth at 48 hrs  11/15/2017    BLOODCX No Growth at 48 hrs  11/15/2017    BLOODCX No Growth After 4 Days   11/12/2017    URINECX No Growth <1000 cfu/mL 05/08/2017    URINECX 10,000-19,000 cfu/ml Escherichia coli 05/03/2017    URINECX 30,000-39,000 cfu/ml Mixed Contaminants X3 05/03/2017     Allergies: No Known Allergies  Medications: Scheduled Meds:  levETIRAcetam 750 mg Intravenous Q12H TOLU   phenytoin 100 mg Intravenous Q12H [address]     Continuous Infusions:   PRN Meds:    acetaminophen 325 mg Q4H PRN   bisacodyl 10 mg Daily PRN   fentanyl citrate (PF) 50 mcg Q1H PRN     Invasive lines and devices: Invasive Devices     Peripheral Intravenous Line            Peripheral IV 11/17/17 Left Hand less than 1 day          Drain            Urethral Catheter Straight-tip 16 Fr  3 days                     Portions of the record may have been created with voice recognition software  Occasional wrong word or "sound a like" substitutions may have occurred due to the inherent limitations of voice recognition software  Read the chart carefully and recognize, using context, where substitutions have occurred      Savanna Vargas PA-C

## 2017-11-18 NOTE — PROGRESS NOTES
Progress Note - Inpatient Palliative Care    Yandy Calero 80 y o  female MRN: [id number]  Unit/Bed#: ICU 11 Encounter: [id number]      Assessment:   Patient Active Problem List   Diagnosis    Essential hypertension    Elevated troponin    Traumatic rhabdomyolysis ([facility])    UTI (urinary tract infection)    Fall at home    Diabetes mellitus type 2, controlled ([facility])    Acute renal failure superimposed on stage 3 chronic kidney disease ([facility])    Lactic acidosis    Dementia    Dyspnea    Pulmonary edema    Acute CHF (congestive heart failure) ([facility])    CKD (chronic kidney disease) stage 3, GFR 30-59 ml/min    SIRS (systemic inflammatory response syndrome) ([facility])    Accelerated hypertension    Subdural hematoma ([facility])    Multiple falls    History of CHF (congestive heart failure)    Acute-on-chronic kidney injury ([facility])    Dementia    Seizure ([facility])    Altered mental status    Subdural hematoma, chronic ([facility])    Acute encephalopathy    Hypokalemia   patient seen in ICU with son at bedside,goal is comfort and unfortunately inpatient hospice house is not an option for her  She is uncomfortable with respiratory distress and no longer has IV access  Still able to swallow  No seizures  Plan:   D/c iv  Change Keppra to oral 1000 mg bid and D/C Dilantin  Schedule oral morphine 10 mg q 6 and use SQ fentanyl as needed  Lorazepam sublingually  Comfort care only   Son has contacted her      Pain History  Current pain location(s):   Pain Scale:     Severity:    Quality:   Current Analgesic regimen:    24 hour history:     Meds/Allergies   all current active meds have been reviewed and current meds:   Current Facility-Administered Medications   Medication Dose Route Frequency    acetaminophen (TYLENOL) rectal suppository 325 mg  325 mg Rectal Q4H PRN    bisacodyl (DULCOLAX) rectal suppository 10 mg  10 mg Rectal Daily PRN    fentanyl citrate (PF) 100 MCG/2ML 50 mcg  50 mcg Subcutaneous Q10 Min PRN    glycopyrrolate (ROBINUL) tablet 1 mg  1 mg Oral TID    levETIRAcetam (KEPPRA) tablet 1,000 mg  1,000 mg Oral Q12H [address]    LORazepam (ATIVAN) tablet 1 mg  1 mg Oral Q1H PRN    morphine oral concentrated solution 10 mg  10 mg Sublingual Q6H [address]    morphine oral concentrated solution 10 mg  10 mg Sublingual Q1H PRN     Pain Management Medications:     No Known Allergies    Objective     Temp:  [97 9 °F (36 6 °C)] 97 9 °F (36 6 °C)  HR:  [80-96] 80  Resp:  [22-38] 22  BP: (116-145)/(51-81) 116/81      Intake/Output Summary (Last 24 hours) at 11/18/17 0859  Last data filed at 11/18/17 0601   Gross per 24 hour   Intake              100 ml   Output              975 ml   Net             -875 ml       Physical Exam: /81   Pulse 88   Temp 98 2 °F (36 8 °C) (Oral)   Resp (!) 33   Ht 5' 2" (1 575 m)   Wt 67 7 kg (149 lb 4 oz)   SpO2 95%   BMI 27 30 kg/m²   General appearance: alert, appears older than stated age, delirious, moderate distress, pale and slowed mentation  Lungs: diminished breath sounds bilaterally  Heart: regular rate and rhythm, S1, S2 normal, no murmur, click, rub or gallop  Abdomen: soft, non-tender; bowel sounds normal; no masses,  no organomegaly  Extremities: extremities normal, atraumatic, no cyanosis or edema  Lab Results:   Imaging Studies: I have personally reviewed pertinent reports  and I have personally reviewed pertinent films in PACS  EKG, Pathology, and Other Studies:     Counseling / Coordination of Care  Total floor / unit time spent today 40 minutes  Greater than 50% of total time was spent with the patient and / or family counseling and / or coordination of care  A description of the counseling / coordination of care: discussed with son at bedside   Discussed with trauma team

## 2017-11-19 NOTE — PROGRESS NOTES
Progress Note - Trauma   Marcella Jones 80 y o  female MRN: [id number]  Unit/Bed#: [facility] 933-01 Encounter: [id number]    Assessment:/plan    Seizure/TBI/subdural:   - no further intervention   - no IV in place, unable to get keppra  - ativan IM for seizure  Diet: NPO with concern for dysphagia  Pain: sublingual morphine and scopolamine patch  Wound care  Comfort care  pallative care consult: pending insurance regarding hospice care      Subjective: family states she has episodes when she is uncomfortable but morphine helps     Objective:     Meds/Allergies   Prescriptions Prior to Admission   Medication Sig Dispense Refill Last Dose    acetaminophen (TYLENOL) 325 mg tablet Take 1 tablet by mouth every 6 (six) hours as needed for mild pain 30 tablet 0     donepezil (ARICEPT) 5 mg tablet Take 5 mg by mouth daily at bedtime       fentaNYL (DURAGESIC) 25 mcg/hr Place 1 patch on the skin every third day       furosemide (LASIX) 40 mg tablet Take 1 tablet by mouth daily for 30 days 30 tablet 0     glimepiride (AMARYL) 4 mg tablet Take 4 mg by mouth 2 (two) times a day         metolazone (ZAROXOLYN) 2 5 mg tablet Take 2 5 mg by mouth daily       metoprolol succinate (TOPROL-XL) 50 mg 24 hr tablet Take 50 mg by mouth daily       potassium chloride (MICRO-K) 10 MEQ CR capsule Take 10 mEq by mouth daily          Vitals: Blood pressure 162/95, pulse 91, temperature 98 1 °F (36 7 °C), temperature source Oral, resp  rate 18, height 5' 2" (1 575 m), weight 67 7 kg (149 lb 4 oz), SpO2 99 %  Body mass index is 27 3 kg/m²   SpO2: SpO2: 99 %    ABG: No results found for: PHART, WFT3BAE, PO2ART, SQH2YSL, U2LMKTNU, BEART, SOURCE      Intake/Output Summary (Last 24 hours) at 11/19/17 1538  Last data filed at 11/19/17 1459   Gross per 24 hour   Intake                0 ml   Output              425 ml   Net             -425 ml       Invasive Devices     Drain            Urethral Catheter Straight-tip 16 Fr  4 days Nutrition/GI Proph/Bowel Reg: npo    Physical Exam:   GEN: NAD  HEENT: NCAT  NEURO: Alert, confused, moving all extremities  CV: irregularly irregular  PULM: CTA bilaterally  GI: soft, non-tender, nondistended  : tyson in place  MSK: trace edema  SKIN: pink, warm, dry      Lab Results:    Imaging/EKG Studies:   Other Studies:   VTE Prophylaxis: scd

## 2017-11-19 NOTE — PROGRESS NOTES
Pt failed dysphagia assessment, spoke to family regarding IV/NG placement for medication  They do not wish to pursue that route

## 2017-11-20 NOTE — PROGRESS NOTES
Rod  80 y o  female MRN: [id number]  Blank    [facility] Encounter: [id number]    Death Pronouncement Note    Called to bedside by RN  Patient is identified visually and identification confirmed with hospital identification bracelet  No spontaneous movements were present  There was no response to verbal or tactile stimuli  No spontaneous respirations present  No breath sounds were appreciated over bilateral lung fields  No heart sounds were auscultated across the precordium  Asystolic on two contiguous leads  Time of death: 0     Family was notified by nursing and family aware  Attending physician, Dr Ashley Friedman, notified       Real MD Meghan  November 20, 2017

## 2017-11-20 NOTE — CASE MANAGEMENT
Gregg Andrews MD Resident Cosign Needed Trauma  Discharge Summaries Date of Service: 2017  2:23 AM         []Hide copied text     Discharge Summary - Korin Jeronimo 80 y o  female MRN: [id number]     Unit/Bed#: [facility] 933-01 Encounter: [id number]     Admission Date: 2017      Admitting Diagnosis: Seizure ([address] ) [R56 9]  Altered mental status [R41 82]     HPI: as per H&P: 80 y  o  female who presents from [facility] after a witnessed seizure  Patient was admitted to [facility] 2 days ago () after a fall for a subfalcine subdural hematoma  She was discharged to [facility]  While there, she was reported to whole body twitching, L sided gaze, and then went unresponsive  She was given Ativan by EMS and seizure activity stopped  Upon arrival to the ED, patient has continued to be unresponsive and only withdrawals to pain  Son is at bedside and states that patient is DNR/DNI  She had a repeat CTH, which showed decreased size pf previous bleed      Procedures Performed: No orders of the defined types were placed in this encounter         Hospital Course: Patient was admitted to ICU service and had EEG performed which showed abnormal discharges  family did not want to escalate care with code status DNR/DNI  Palliative consult was placed  Son was present during discussion who chose comfort care measures  Patient was transferred from ICU to floor with no escalation of care  IV was removed as request by family and patient received pain medications sublingually  The admitting team was working with patient's insurance for hospice care  On  called to bedside by nursing  Patient had passed away with time of death 20 [id number]   Family was noticed           Discharge Diagnosis:         Discharge instructions/Information to patient and family:   See after visit summary for information provided to patient and family        Provisions for Follow-Up Care:  See after visit summary for information related to follow-up care and any pertinent home health orders        Disposition:         I spent 20 minutes discharging the patient  This time was spent on the day of discharge  I had direct contact with the patient on the day of discharge  Additional documentation is required if more than 30 minutes were spent on discharge                 Revision History      Notification of Discharge  This is a Notification of Discharge from our facility [facility]  Please be advised that this patient has been discharge from our facility  Below you will find the admission and discharge date and time including the patients disposition  PRESENTATION DATE: 2017  8:12 PM  IP ADMISSION DATE: 17  DISCHARGE DATE: 2017  5:08 AM  DISPOSITION: [facility] in the [facility] by Eric Perry for 2017  Network Utilization Review Department  Phone: [phone]; Fax [phone]  ATTENTION: The Network Utilization Review Department is now centralized for our 7 Facilities  Make a note that we have a new phone and fax numbers for our Department  Please call with any questions or concerns to [phone] and carefully follow the prompts so that you are directed to the right person  All voicemails are confidential  Fax any determinations, approvals, denials, and requests for initial or continue stay review clinical to [phone]  Due to HIGH CALL volume, it would be easier if you could please send faxed requests to expedite your requests and in part, help us provide discharge notifications faster

## 2017-11-20 NOTE — NURSING NOTE
Pt expires at [id number] with family at bedside   notified and came for evaluation  Post mortem care rendered  Security and staff nurse escort pt corpse to the [facility]

## 2017-11-20 NOTE — PROGRESS NOTES
Son came to RN stating that his mother stop breathing  No breath sounds noted and no pulses  Covering physician paged to pronounce

## 2017-11-20 NOTE — DISCHARGE SUMMARY
Discharge Summary - Parth Baumann 80 y o  female MRN: [id number]    [facility]: [id number]    Admission Date: 2017     Admitting Diagnosis: Seizure ([address] ) [R56 9]  Altered mental status [R41 82]    HPI: as per H&P: 80 y o  female who presents from [facility] after a witnessed seizure  Patient was admitted to [facility] 2 days ago () after a fall for a subfalcine subdural hematoma  She was discharged to [facility]  While there, she was reported to whole body twitching, L sided gaze, and then went unresponsive  She was given Ativan by EMS and seizure activity stopped  Upon arrival to the ED, patient has continued to be unresponsive and only withdrawals to pain  Son is at bedside and states that patient is DNR/DNI  She had a repeat CTH, which showed decreased size pf previous bleed  Procedures Performed: No orders of the defined types were placed in this encounter  Hospital Course: Patient was admitted to ICU service and had EEG performed which showed abnormal discharges  family did not want to escalate care with code status DNR/DNI  Palliative consult was placed  Son was present during discussion who chose comfort care measures  Patient was transferred from ICU to floor with no escalation of care  IV was removed as request by family and patient received pain medications sublingually  The admitting team was working with patient's insurance for hospice care  On  called to bedside by nursing  Patient had passed away with time of death 12 [id number]  Family was noticed        Discharge Diagnosis:       Discharge instructions/Information to patient and family:   See after visit summary for information provided to patient and family  Provisions for Follow-Up Care:  See after visit summary for information related to follow-up care and any pertinent home health orders  Disposition:       I spent 20 minutes discharging the patient  This time was spent on the day of discharge  I had direct contact with the patient on the day of discharge  Additional documentation is required if more than 30 minutes were spent on discharge

## 2018-01-10 NOTE — PROCEDURES
Procedures by Ro Issa MD at  11/15/2017 10:59 AM      Author:  Ro Issa MD Service:  Neurology Author Type:  Physician    Filed:  11/15/2017 11:37 AM Date of Service:  11/15/2017 10:59 AM Status:  Signed    :  Ro Issa MD (Physician)        Procedure Orders:       1  EEG Video Monitoring 24 Hour [id number] ordered by Elvin De La Fuente DO at 17 2325                  Continuous Video EEG  Long Term Monitoring    Patient Name:  Isadora Rodriguez  MRN: [id number]   :  1920 File #: Jesus Manuel Nathan 16   Age: 80 y o  Encounter #: [id number]   Date performed: 11/15/2017 Referring Provider: Elvin De La Fuente DO          Report date: 11/15/2017          Study type: Continuous video EEG, up to 24 hours    ICD 10 diagnosis: Spells/Fit NOS R56 9    Start time: 11/15/2017 01:11  End time: 2017 08:00    Patient History:  Patient is 80 y o  female on continuous video EEG monitoring for the assessment of seizures, characterization  of events, and effect of treatment  Patient was recently discharged after stable subfalcine subdural hematoma, return to the hospital after witnessed seizure  Patient at baseline has dementia however on assessment she had low Ilia coma score concerning  for subclinical seizures      Current AEDs:  Medications include:   chlorhexidine 15 mL Swish [employer] Spit Q12H [address]   heparin (porcine) 5,000 Units Subcutaneous Q8H [address]   insulin lispro 2-12 Units Subcutaneous TID AC   insulin regular      levETIRAcetam 1,000 mg Intravenous Once   levETIRAcetam 750 mg Intravenous Q12H [address]   LORazepam 0 5 mg Intravenous Once   piperacillin-tazobactam 2 25 g Intravenous Q6H   vancomycin 17 5 mg/kg Intravenous Q24H       Description of Procedure:   A 24 hours continuous video EEG was performed with electrodes applied using the International 10-20 System at least 16 channels are reviewed and formatted into longitudinal bipolar, transverse  bipolar, and referential (to common reference or calculated common reference) montages  Additional electrodes used included T1, T2, and extraocular electrodes, and ECG, along with video recording  The EEG was recorded with the patient  encephalopathic state  A monitoring technologist supervised the continuous recording  The recording was technically satisfactory  Findings:   Background Activity:   At the start of study there is excessive EMG artifact that obscures the background  However, it seems like there is organization of very low voltage beta-alpha activity anteriorly and low voltage alpha-theta  activity posteriorly better over the left hemisphere  The right hemisphere has intermittent polymorphic 0 5-2 Hz delta activity and a mixture of frequencies  Abnormal findings:   Around 3AM, the EMG artifact started to go away, as the patient enters sleep, the posterior alpha-theta activity is no longer present, more diffuse low voltage activity  Low-moderate voltage polymorphic  2-3 Hz delta activity is present over the right temporal region  Around 5AM, there is episodic recurrent 5-6 Hz theta activity over the right posterior quadrant that stand out from the background  By 7AM, there is intermittent sharply contoured poorly  organized 3-4 Hz waveforms over the right temporal region, appears to be electropositive around T4 and T6  At 07:30 T4/T6 sharp waves become more rhythmical at 5 Hz followed by rhythmic 2 Hz delta activity over the right temporal region over the course  of 1-2 minutes  Up to 8AM, these focal seizures over the right mid-posterior temporal region recur every 2-4 minutes  There is no clear clinical correlation to these electrographic seizures  Other findings: The single lead ECG shows an irregular cardiac rhythm  Interpretation:    This is an abnormal nearly 7 hours continuous video EEG recording  due to right hemispheric slowing, maximal over the right temporal region, with recurrent rhythmical activity slowly becoming more organized around 6-7AM into focal electrographic seizures of the right temporal region, and establishing focal electrographic  status epilepticus  These findings were communicated to the primary team to consider escalation of anticonvulsant therapy  MD [facility]  Concetta VELEZ    Nov 15 2017 11:37AM [facility] Standard Time

## 2018-01-11 NOTE — PROCEDURES
Procedures by Jesus Church MD at  2017  3:08 PM      Author:  Jesus Church MD Service:  Neurology Author Type:  Physician    Filed:  2017  3:19 PM Date of Service:  2017  3:08 PM Status:  Signed    :  Jesus Church MD (Physician)        Procedure Orders:       1  EEG Video Monitoring 24 Hour [id number] ordered by Jesus Church MD at 17 8615                  Continuous Video EEG  Long Term Monitoring    Patient Name:  Paul Reyna  MRN: [id number]   :  1920 File #: Beuford Dress    Age: 80 y o  Encounter #: [id number]   Date performed: -2017 Referring Provider: Minerva Martinez DO          Report date: 2017          Study type: Continuous video EEG, up to 24 hours    ICD 10 diagnosis: Spells/Fit NOS R56 9 and Encephalopathy, unspecified G93 40    Start time: 2017 08:01  End time: 2017 08:00    Patient History:  Patient is 80 y o  female on continuous video EEG monitoring for the assessment of seizures, characterization  of events, and effect of treatment  Patient had a traumatic head injury with a subfalcine subdural hematoma, subsequently later had a seizure, found to be encephalopathic  Continuous video EEG monitoring captured recurrent electrographic seizures over  the right temporal region  Current AEDs:  Medications include:   insulin lispro 4-20 Units Subcutaneous BID   levETIRAcetam 750 mg Intravenous Q12H [facility]   phenytoin 100 mg Intravenous Q12H [facility]       Description of Procedure:   A 24 hours continuous video EEG was performed with electrodes applied using the International 10-20 System at least 16 channels are reviewed and formatted into longitudinal bipolar, transverse  bipolar, and referential (to common reference or calculated common reference) montages  Additional electrodes used included T1, T2, and extraocular electrodes, and ECG, along with video recording  The EEG was recorded with the patient  encephalopathic state   A monitoring technologist supervised the continuous recording  The recording was technically satisfactory  Findings:   Background Activity: The background is asymmetric due to slower activity over the right hemisphere  The background consists of diffuse low voltage beta-alpha activity with voltages slightly higher over the  posterior region and intermittent low-moderate voltage polymorphic 2 Hz delta activity  The right hemisphere consist of nearly continuous to intermittent low-moderate voltage polymorphic 1-2 Hz delta activity with quasi-periodic lateralized epileptiform  discharges maximal over C4-T4 and P4-T6  Sleep is characterized by attenuation of alpha and beta activity, diffuse theta activity is present along with fragmentary sleep spindles  Other findings: The single lead ECG shows a irregular cardiac rhythm  Events: There are no patient push button events  Interpretation: This is an abnormal 24 hours continuous video EEG recording diffuse background slowing, right hemispheric  slowing with quasi-periodic lateralized epileptiform discharges (quasi-PLEDs)  These findings indicate a highly epileptogenic structural focus in the right hemisphere maximal over the centro-parieto-temporal region superimposed over mild-moderate diffuse cerebral dysfunction  There are no electrographic seizures during this monitored period  Adriane Kussmaul, MD Sharlyn [facility]  Mann VELEZ    Nov 17 2017  3:19PM [facility] Standard Time

## 2018-01-11 NOTE — PROCEDURES
Procedures by Harmony Jay MD at  2017  1:55 PM      Author:  Harmony Jay MD Service:  Neurology Author Type:  Physician    Filed:  2017  2:24 PM Date of Service:  2017  1:55 PM Status:  Signed    :  Harmony Jay MD (Physician)        Procedure Orders:       1  EEG Video Monitoring 24 Hour [id number] ordered by Harmony Jay MD at 11/15/17 0935                  Continuous Video EEG  Long Term Monitoring    Patient Name:  Sabrina Perdue  MRN: [id number]   :  1920 File #: Gui Galarza    Age: 80 y o  Encounter #: [id number]   Date performed: 11/ Referring Provider: Candelario Nicolas DO          Report date: 2017          Study type: Continuous video EEG, up to 24 hours    ICD 10 diagnosis: Spells/Fit NOS R56 9 and Encephalopathy, unspecified G93 40    Start time: 11/15/2017 08:01  End time: 2017 08:00    Patient History:  Patient is 80 y o  female on continuous video EEG monitoring for the assessment of seizures, characterization  of events, and effect of treatment  Patient was recently discharged from the hospital after stable subfalcine subdural hematoma, returned home after with a seizure, she continued to be encephalopathic  EEG showed recurrent seizures from the right temporal  region  Current AEDs:  Medications include:   heparin (porcine) 5,000 Units Subcutaneous Q8H [address]   insulin lispro 2-12 Units Subcutaneous TID AC   levETIRAcetam 750 mg Intravenous Q12H [facility]   phenytoin 100 mg Intravenous Q12H [address]   piperacillin-tazobactam 2 25 g Intravenous Q6H   vancomycin 17 5 mg/kg Intravenous Q24H       Description of Procedure:   A 24 hours continuous video EEG was performed with electrodes applied using the International 10-20 System at least 16 channels are reviewed and formatted into longitudinal bipolar, transverse  bipolar, and referential (to common reference or calculated common reference) montages    Additional electrodes used included T1, T2, and extraocular electrodes, and ECG, along with video recording  The EEG was recorded with the patient  awake, drowsy, and asleep state  A monitoring technologist supervised the continuous recording  The recording was technically  satisfactory  Findings: The background is asymmetric due to continuous slower activity over the right hemisphere along with recurrent electrographic seizures  The left hemisphere is predominantly lower voltage, better organized,  during periods of wakefulness there appears to be very low-voltage alpha-theta activity in the posterior region  The right hemisphere consist of low-moderate voltage polymorphic 5-7 Hz theta activity and 2-3 Hz delta activity  There are frequent episodic runs of sharply contoured rhythmic 7 Hz theta activity electropositive maximal over T4-T6 involving the centro-temporal and parieto-temporal region which is then followed by a period of polymorphic 2-3 Hz delta activity and  episodic very low-voltage polyspike waves at 1 Hz  (Best seen on transverse bipolar montage with electronegative deflections on C4-T4 and P4-T6 )  These electrographic seizures last about 45-60 seconds recurring every 5-10 minutes between 8AM and 11AM     After 11AM, the electrographic seizures over the right temporal region become more difficult to detect due to relatively low voltage compared to higher voltage EMG artifact when the patient is awake  There is no detectable electrographic seizure after 2:00 PM     After 2PM, there is quasiperiodic lateralized epileptiform discharges (quasi-PLEDs) over the right posterior termporal region  Sleep is characterized by attenuation of background voltages and fragmentary sleep spindles  Other findings: The single lead ECG shows a regular and sinus rhythm  Interpretation:    This is an abnormal 24 hours continuous video EEG recording due to recurrent multiple electrographic seizures  over the right hemisphere maximal over the right temporal region (more towards the parasagittal region) consistent with focal electrographic status epilepticus superimposed over right hemispheric dysfunction  These findings were communicated to the primary team and Neurology consultants  Electrographic seizures are difficult to detect due to low voltage however no seizures were present after 2PM   Quasi-peridic lateralized epileptiform discharges (quasi-PLEDs) over the right posterior temporal region superimposed on right hemispheric focal slowing indicates a highly epileptogenic structural lesion  Nallely James MD  [facility]  Bijal VELEZ    Nov 16 2017  2:25PM [facility] Standard Time

## 2018-01-15 NOTE — MISCELLANEOUS
History of Present Illness    The patient is being contacted for follow-up after hospitalization  Hospitalization The hospitalization was not a readmission, The patient was discharged on 5/12/17  She has a high risk for readmission  She was discharged to a SNF for rehabilitation  The patient's status is short term  The facility name is: [facility]  Phone number: [phone]  Floor/Location: 2021 [facility]  3rd floor  I spoke with Dina Martinez, nurse  The patient's discharge weight is 165 The patient's weight today is 163 4  Patient is experiencing the following symptoms:  The patient is currently asymptomatic  no acute  [place] Issues include: none  Treatment Questions: Diet ordered is Low sodium, cardiac, staff is aware of symptoms to report, discharge medications were reconciled with the facility provider/PCP, the patient/family is compliant with diet, daily weights are being done and a plan is in place for who to call for worsening symptoms  The patient has the following appointments:    [facility] physician  Staff re-education topics include symptoms to report and importance of compliance with treatment  [facility] Additional Notes:   No discharge plans as yet        Signatures   Electronically signed by : Osiel Valero, ; May 15 2017  1:57PM EST                       (Author)

## 2019-09-03 NOTE — PROGRESS NOTES
Patient received from ED, [facility] aware of patients mental status and stated that patient is a DNR/DNI  Son at bedside and updated, 1L Isolyte bolus ordered and given, IVF started all labs sent, straight cath done for UA, EMU applied as per Neurology patient is not seizing  EKG done, aritifact seen on EKG, however patient is in afib at this time  5mg Lopressor given for SBP greater than 180, approx 17 pre exsisting pressure ulcers documented and dressed  As per [facility] Franco Dean indicated for stg 3 pressure ulcer on sacrum  no